# Patient Record
Sex: MALE | Race: WHITE | NOT HISPANIC OR LATINO | Employment: OTHER | URBAN - METROPOLITAN AREA
[De-identification: names, ages, dates, MRNs, and addresses within clinical notes are randomized per-mention and may not be internally consistent; named-entity substitution may affect disease eponyms.]

---

## 2017-01-19 ENCOUNTER — ALLSCRIPTS OFFICE VISIT (OUTPATIENT)
Dept: OTHER | Facility: OTHER | Age: 72
End: 2017-01-19

## 2017-02-25 ENCOUNTER — APPOINTMENT (OUTPATIENT)
Dept: LAB | Facility: HOSPITAL | Age: 72
End: 2017-02-25
Payer: MEDICARE

## 2017-02-25 ENCOUNTER — ALLSCRIPTS OFFICE VISIT (OUTPATIENT)
Dept: OTHER | Facility: OTHER | Age: 72
End: 2017-02-25

## 2017-02-25 DIAGNOSIS — L03.90 CELLULITIS: ICD-10-CM

## 2017-02-25 PROCEDURE — 87147 CULTURE TYPE IMMUNOLOGIC: CPT

## 2017-02-25 PROCEDURE — 87186 SC STD MICRODIL/AGAR DIL: CPT

## 2017-02-25 PROCEDURE — 87070 CULTURE OTHR SPECIMN AEROBIC: CPT

## 2017-02-27 ENCOUNTER — GENERIC CONVERSION - ENCOUNTER (OUTPATIENT)
Dept: OTHER | Facility: OTHER | Age: 72
End: 2017-02-27

## 2017-02-27 LAB
BACTERIA NOSE AEROBE CULT: NORMAL
BACTERIA NOSE AEROBE CULT: NORMAL

## 2017-04-06 ENCOUNTER — ALLSCRIPTS OFFICE VISIT (OUTPATIENT)
Dept: OTHER | Facility: OTHER | Age: 72
End: 2017-04-06

## 2017-04-06 ENCOUNTER — GENERIC CONVERSION - ENCOUNTER (OUTPATIENT)
Dept: OTHER | Facility: OTHER | Age: 72
End: 2017-04-06

## 2017-05-03 ENCOUNTER — GENERIC CONVERSION - ENCOUNTER (OUTPATIENT)
Dept: OTHER | Facility: OTHER | Age: 72
End: 2017-05-03

## 2017-07-10 ENCOUNTER — TRANSCRIBE ORDERS (OUTPATIENT)
Dept: ADMINISTRATIVE | Facility: HOSPITAL | Age: 72
End: 2017-07-10

## 2017-07-10 ENCOUNTER — APPOINTMENT (OUTPATIENT)
Dept: LAB | Facility: MEDICAL CENTER | Age: 72
End: 2017-07-10
Payer: MEDICARE

## 2017-07-10 DIAGNOSIS — R73.09 OTHER ABNORMAL GLUCOSE: ICD-10-CM

## 2017-07-10 DIAGNOSIS — E55.9 VITAMIN D DEFICIENCY: ICD-10-CM

## 2017-07-10 DIAGNOSIS — I10 ESSENTIAL (PRIMARY) HYPERTENSION: ICD-10-CM

## 2017-07-10 DIAGNOSIS — Z12.5 ENCOUNTER FOR SCREENING FOR MALIGNANT NEOPLASM OF PROSTATE: ICD-10-CM

## 2017-07-10 LAB
ALBUMIN SERPL BCP-MCNC: 3.7 G/DL (ref 3.5–5)
ALP SERPL-CCNC: 49 U/L (ref 46–116)
ALT SERPL W P-5'-P-CCNC: 27 U/L (ref 12–78)
ANION GAP SERPL CALCULATED.3IONS-SCNC: 5 MMOL/L (ref 4–13)
AST SERPL W P-5'-P-CCNC: 25 U/L (ref 5–45)
BASOPHILS # BLD AUTO: 0.01 THOUSANDS/ΜL (ref 0–0.1)
BASOPHILS NFR BLD AUTO: 0 % (ref 0–1)
BILIRUB SERPL-MCNC: 0.67 MG/DL (ref 0.2–1)
BUN SERPL-MCNC: 11 MG/DL (ref 5–25)
CALCIUM SERPL-MCNC: 9 MG/DL (ref 8.3–10.1)
CHLORIDE SERPL-SCNC: 106 MMOL/L (ref 100–108)
CHOLEST SERPL-MCNC: 180 MG/DL (ref 50–200)
CO2 SERPL-SCNC: 29 MMOL/L (ref 21–32)
CREAT SERPL-MCNC: 0.97 MG/DL (ref 0.6–1.3)
EOSINOPHIL # BLD AUTO: 0.41 THOUSAND/ΜL (ref 0–0.61)
EOSINOPHIL NFR BLD AUTO: 7 % (ref 0–6)
ERYTHROCYTE [DISTWIDTH] IN BLOOD BY AUTOMATED COUNT: 14.8 % (ref 11.6–15.1)
EST. AVERAGE GLUCOSE BLD GHB EST-MCNC: 126 MG/DL
GFR SERPL CREATININE-BSD FRML MDRD: >60 ML/MIN/1.73SQ M
GLUCOSE P FAST SERPL-MCNC: 93 MG/DL (ref 65–99)
HBA1C MFR BLD: 6 % (ref 4.2–6.3)
HCT VFR BLD AUTO: 45.9 % (ref 36.5–49.3)
HDLC SERPL-MCNC: 49 MG/DL (ref 40–60)
HGB BLD-MCNC: 15.7 G/DL (ref 12–17)
LDLC SERPL CALC-MCNC: 110 MG/DL (ref 0–100)
LYMPHOCYTES # BLD AUTO: 1.72 THOUSANDS/ΜL (ref 0.6–4.47)
LYMPHOCYTES NFR BLD AUTO: 30 % (ref 14–44)
MCH RBC QN AUTO: 30.9 PG (ref 26.8–34.3)
MCHC RBC AUTO-ENTMCNC: 34.2 G/DL (ref 31.4–37.4)
MCV RBC AUTO: 90 FL (ref 82–98)
MONOCYTES # BLD AUTO: 0.68 THOUSAND/ΜL (ref 0.17–1.22)
MONOCYTES NFR BLD AUTO: 12 % (ref 4–12)
NEUTROPHILS # BLD AUTO: 2.97 THOUSANDS/ΜL (ref 1.85–7.62)
NEUTS SEG NFR BLD AUTO: 51 % (ref 43–75)
NRBC BLD AUTO-RTO: 0 /100 WBCS
PLATELET # BLD AUTO: 181 THOUSANDS/UL (ref 149–390)
PMV BLD AUTO: 9.5 FL (ref 8.9–12.7)
POTASSIUM SERPL-SCNC: 4.2 MMOL/L (ref 3.5–5.3)
PROT SERPL-MCNC: 7.4 G/DL (ref 6.4–8.2)
PSA SERPL-MCNC: 1.7 NG/ML (ref 0–4)
RBC # BLD AUTO: 5.08 MILLION/UL (ref 3.88–5.62)
SODIUM SERPL-SCNC: 140 MMOL/L (ref 136–145)
TRIGL SERPL-MCNC: 103 MG/DL
TSH SERPL DL<=0.05 MIU/L-ACNC: 1.02 UIU/ML (ref 0.36–3.74)
WBC # BLD AUTO: 5.8 THOUSAND/UL (ref 4.31–10.16)

## 2017-07-10 PROCEDURE — 36415 COLL VENOUS BLD VENIPUNCTURE: CPT

## 2017-07-10 PROCEDURE — 85025 COMPLETE CBC W/AUTO DIFF WBC: CPT

## 2017-07-10 PROCEDURE — 83036 HEMOGLOBIN GLYCOSYLATED A1C: CPT

## 2017-07-10 PROCEDURE — G0103 PSA SCREENING: HCPCS

## 2017-07-10 PROCEDURE — 80061 LIPID PANEL: CPT

## 2017-07-10 PROCEDURE — 84443 ASSAY THYROID STIM HORMONE: CPT

## 2017-07-10 PROCEDURE — 80053 COMPREHEN METABOLIC PANEL: CPT

## 2017-07-20 ENCOUNTER — ALLSCRIPTS OFFICE VISIT (OUTPATIENT)
Dept: OTHER | Facility: OTHER | Age: 72
End: 2017-07-20

## 2017-08-18 ENCOUNTER — OFFICE VISIT (OUTPATIENT)
Dept: URGENT CARE | Facility: MEDICAL CENTER | Age: 72
End: 2017-08-18
Payer: MEDICARE

## 2017-08-18 PROCEDURE — 99202 OFFICE O/P NEW SF 15 MIN: CPT

## 2017-08-18 PROCEDURE — G0463 HOSPITAL OUTPT CLINIC VISIT: HCPCS

## 2017-08-23 ENCOUNTER — ALLSCRIPTS OFFICE VISIT (OUTPATIENT)
Dept: OTHER | Facility: OTHER | Age: 72
End: 2017-08-23

## 2017-09-14 ENCOUNTER — OFFICE VISIT (OUTPATIENT)
Dept: URGENT CARE | Facility: MEDICAL CENTER | Age: 72
End: 2017-09-14
Payer: MEDICARE

## 2017-09-14 PROCEDURE — G0463 HOSPITAL OUTPT CLINIC VISIT: HCPCS

## 2017-09-14 PROCEDURE — 99214 OFFICE O/P EST MOD 30 MIN: CPT

## 2017-11-29 ENCOUNTER — GENERIC CONVERSION - ENCOUNTER (OUTPATIENT)
Dept: FAMILY MEDICINE CLINIC | Facility: CLINIC | Age: 72
End: 2017-11-29

## 2017-12-29 ENCOUNTER — GENERIC CONVERSION - ENCOUNTER (OUTPATIENT)
Dept: FAMILY MEDICINE CLINIC | Facility: CLINIC | Age: 72
End: 2017-12-29

## 2018-01-10 ENCOUNTER — TRANSCRIBE ORDERS (OUTPATIENT)
Dept: ADMINISTRATIVE | Facility: HOSPITAL | Age: 73
End: 2018-01-10

## 2018-01-10 ENCOUNTER — APPOINTMENT (OUTPATIENT)
Dept: LAB | Facility: MEDICAL CENTER | Age: 73
End: 2018-01-10
Payer: MEDICARE

## 2018-01-10 DIAGNOSIS — I10 HYPERTENSION, UNSPECIFIED TYPE: ICD-10-CM

## 2018-01-10 DIAGNOSIS — E78.49 FAMILIAL COMBINED HYPERLIPIDEMIA: ICD-10-CM

## 2018-01-10 DIAGNOSIS — R73.09 ELEVATED GLUCOSE: Primary | ICD-10-CM

## 2018-01-10 DIAGNOSIS — R73.09 ELEVATED GLUCOSE: ICD-10-CM

## 2018-01-10 LAB
ALBUMIN SERPL BCP-MCNC: 3.8 G/DL (ref 3.5–5)
ALP SERPL-CCNC: 54 U/L (ref 46–116)
ALT SERPL W P-5'-P-CCNC: 27 U/L (ref 12–78)
ANION GAP SERPL CALCULATED.3IONS-SCNC: 4 MMOL/L (ref 4–13)
AST SERPL W P-5'-P-CCNC: 21 U/L (ref 5–45)
BILIRUB SERPL-MCNC: 0.81 MG/DL (ref 0.2–1)
BUN SERPL-MCNC: 11 MG/DL (ref 5–25)
CALCIUM SERPL-MCNC: 9 MG/DL (ref 8.3–10.1)
CHLORIDE SERPL-SCNC: 106 MMOL/L (ref 100–108)
CHOLEST SERPL-MCNC: 155 MG/DL (ref 50–200)
CO2 SERPL-SCNC: 30 MMOL/L (ref 21–32)
CREAT SERPL-MCNC: 0.92 MG/DL (ref 0.6–1.3)
EST. AVERAGE GLUCOSE BLD GHB EST-MCNC: 120 MG/DL
GFR SERPL CREATININE-BSD FRML MDRD: 82 ML/MIN/1.73SQ M
GLUCOSE P FAST SERPL-MCNC: 90 MG/DL (ref 65–99)
HBA1C MFR BLD: 5.8 % (ref 4.2–6.3)
HDLC SERPL-MCNC: 54 MG/DL (ref 40–60)
LDLC SERPL CALC-MCNC: 79 MG/DL (ref 0–100)
POTASSIUM SERPL-SCNC: 4.1 MMOL/L (ref 3.5–5.3)
PROT SERPL-MCNC: 7.8 G/DL (ref 6.4–8.2)
SODIUM SERPL-SCNC: 140 MMOL/L (ref 136–145)
TRIGL SERPL-MCNC: 112 MG/DL

## 2018-01-10 PROCEDURE — 36415 COLL VENOUS BLD VENIPUNCTURE: CPT

## 2018-01-10 PROCEDURE — 80061 LIPID PANEL: CPT

## 2018-01-10 PROCEDURE — 80053 COMPREHEN METABOLIC PANEL: CPT

## 2018-01-10 PROCEDURE — 83036 HEMOGLOBIN GLYCOSYLATED A1C: CPT

## 2018-01-11 NOTE — RESULT NOTES
Verified Results  * XR SPINE THORACIC 3 VIEW [id number] 12:54PM Jenny Pratt Order Number: [id number]     Test Name Result Flag Reference   XR SPINE THORACIC 3 VW (Report)     THORACIC SPINE     INDICATION: Lower thoracic pain      COMPARISON: None     VIEWS: AP, lateral and coned-down projections; 5 images     FINDINGS:     Thoracic vertebrae demonstrate normal stature  Minimal dextro scoliosis  Mild degenerative change with small endplate osteophytes  There is no fracture or pathologic bone lesion  There is no displacement of the paraspinal line  The pedicles are intact  IMPRESSION:     Mild degenerative change  No acute osseous abnormality         Workstation performed: [id number]     Signed by:   Eugenia aPz MD   7/19/16       Plan  Acute bilateral thoracic back pain    · *1 - [facility]  Consult  Status: Hold For - Scheduling   Requested for: [address] Summary provided  : Yes

## 2018-01-12 VITALS
WEIGHT: 221.19 LBS | BODY MASS INDEX: 29.31 KG/M2 | RESPIRATION RATE: 16 BRPM | OXYGEN SATURATION: 97 % | HEART RATE: 71 BPM | HEIGHT: 73 IN | DIASTOLIC BLOOD PRESSURE: 70 MMHG | TEMPERATURE: 95.4 F | SYSTOLIC BLOOD PRESSURE: 102 MMHG

## 2018-01-12 NOTE — RESULT NOTES
Verified Results  (1) NASAL CULTURE 35Ccw2421 04:39PM Madiha Mena    Order Number: [id number]     Test Name Result Flag Reference   CLINICAL REPORT (Report)     Test:        Nasal culture  Specimen Type:   Nasopharyngeal  Specimen Date:   2/25/2017 4:39 PM  Result Date:    2/27/2017 12:19 PM  Result Status:   Final result  Resulting Lab:   [place] [address]            Tel: [phone]      CULTURE                                       ------------------                                   4+ Growth of Staphylococcus aureus    4+ Growth of Mixed Respiratory Greta      SUSCEPTIBILITY                                   ------------------                                                       Staphylococcus aureus  METHOD                 KATERYNA  -------------------------------------  ----------------------------  AMOXICILLIN + CLAVULANATE        <=4/2 ug/ml   Susceptible  AMPICILLIN ($$)             >8 00 ug/ml   Resistant  AMPICILLIN + SULBACTAM ($)       <=8/4 ug/ml   Susceptible  CEFAZOLIN ($)              <=8 00 ug/ml   Susceptible  CLINDAMYCIN ($)             <=0 50 ug/ml   Susceptible  ERYTHROMYCIN ($$$$)           <=0 50 ug/ml   Susceptible  GENTAMICIN ($$)             <=4 ug/ml    Susceptible  OXACILLIN                0 50 ug/ml    Susceptible  TETRACYCLINE              <=4 ug/ml    Susceptible  TRIMETHOPRIM + SULFAMETHOXAZOLE ($$$)  <=0 5/9 5 ug/ml Susceptible  VANCOMYCIN ($)             1 00 ug/ml    Susceptible

## 2018-01-13 VITALS
HEIGHT: 73 IN | WEIGHT: 224.31 LBS | SYSTOLIC BLOOD PRESSURE: 138 MMHG | BODY MASS INDEX: 29.73 KG/M2 | DIASTOLIC BLOOD PRESSURE: 74 MMHG | OXYGEN SATURATION: 95 % | TEMPERATURE: 97.7 F | HEART RATE: 70 BPM

## 2018-01-13 VITALS
OXYGEN SATURATION: 98 % | TEMPERATURE: 95.7 F | RESPIRATION RATE: 16 BRPM | DIASTOLIC BLOOD PRESSURE: 70 MMHG | BODY MASS INDEX: 29.71 KG/M2 | WEIGHT: 224.19 LBS | SYSTOLIC BLOOD PRESSURE: 118 MMHG | HEART RATE: 74 BPM | HEIGHT: 73 IN

## 2018-01-13 VITALS
DIASTOLIC BLOOD PRESSURE: 60 MMHG | BODY MASS INDEX: 29.36 KG/M2 | SYSTOLIC BLOOD PRESSURE: 110 MMHG | WEIGHT: 221.5 LBS | RESPIRATION RATE: 18 BRPM | TEMPERATURE: 97.9 F | HEIGHT: 73 IN | OXYGEN SATURATION: 93 % | HEART RATE: 84 BPM

## 2018-01-13 VITALS
TEMPERATURE: 97.9 F | OXYGEN SATURATION: 94 % | SYSTOLIC BLOOD PRESSURE: 110 MMHG | WEIGHT: 223 LBS | HEIGHT: 73 IN | HEART RATE: 74 BPM | DIASTOLIC BLOOD PRESSURE: 78 MMHG | BODY MASS INDEX: 29.55 KG/M2

## 2018-01-22 ENCOUNTER — ALLSCRIPTS OFFICE VISIT (OUTPATIENT)
Dept: OTHER | Facility: OTHER | Age: 73
End: 2018-01-22

## 2018-01-23 NOTE — PROGRESS NOTES
Assessment   1  Benign essential hypertension (401 1) (I10)   2  Abnormal blood sugar (790 29) (R73 09)   3  Intravascular lymphomatosis   4  BPH without urinary obstruction (600 00) (N40 0)    Plan   Abnormal blood sugar, Benign essential hypertension, Encounter for prostate cancer    screening, Familial combined hyperlipidemia, Intravascular lymphomatosis    · (1) PSA (SCREEN) (Dx V76 44 Screen for Prostate Cancer); Status:Hold For - Exact Date; Requested for:After [id number]; Abnormal blood sugar, Benign essential hypertension, Familial combined    hyperlipidemia, Intravascular lymphomatosis    · (1) CBC/PLT/DIFF; Status:Hold For - Exact Date; Requested for:After 40YTT3640;    · (1) COMPREHENSIVE METABOLIC PANEL; Status:Hold For - Exact Date; Requested    for:After 23NIM9135;    · (1) HEMOGLOBIN A1C; Status:Hold For - Exact Date; Requested for:After [id number];    · (1) LIPID PANEL FASTING W DIRECT LDL REFLEX; Status:Hold For - Exact Date; Requested for:After 25NDZ7956;    · (1) TSH WITH FT4 REFLEX; Status:Hold For - Exact Date; Requested for:After [id number]; Discussion/Summary      -- arthritis: Recommend continue glucosamine  Recommend adding OTC Omega 3 and increasing low-impact aerobic exercise  Hypertension: Well controlled on quinapril 20 mg daily  prediabetes: A1c 5 8%, was 6 0%  Continue reduced carb diet  Will continue to monitor papulosis: continue regular follow-up with his dermatologist, Dr Floyd Bernal   BPH     patient had flu shot   6 months, Medicare wellness and follow-up, fasting blood work at [email or web address] prior  Possible side effects of new medications were reviewed with the patient/guardian today  The treatment plan was reviewed with the patient/guardian  The patient/guardian understands and agrees with the treatment plan      Chief Complaint   pt here for his 6 month check up and to review labs    Patient is here today for follow up of chronic conditions described in HPI  History of Present Illness   Patient presents for recheck of chronic medical problems today  Overall is feeling well  Patient complains occasional various arthritic pains  He has been using glucosamine regularly  Patient maintains an active lifestyle, but does not exercise regularly  Doing well on quinapril for hypertension  Patient had labs drawn on January 16th      Review of Systems        Constitutional: No fever or chills, feels well, no tiredness, no recent weight gain or weight loss  Cardiovascular: No complaints of slow heart rate, no fast heart rate, no chest pain, no palpitations, no leg claudication, no lower extremity  Respiratory: No complaints of shortness of breath, no wheezing, no cough, no SOB on exertion, no orthopnea or PND  Gastrointestinal: No complaints of abdominal pain, no constipation, no nausea or vomiting, no diarrhea or bloody stools  Genitourinary: No complaints of dysuria, no incontinence, no hesitancy, no nocturia, no genital lesion, no testicular pain  Active Problems   1  Abnormal blood sugar (790 29) (R73 09)   2  Acute bronchitis (466 0) (J20 9)   3  Allergic rhinitis (477 9) (J30 9)   4  Benign essential hypertension (401 1) (I10)   5  BPH without urinary obstruction (600 00) (N40 0)   6  Colon cancer screening (V76 51) (Z12 11)   7  Corneal abrasion, left (918 1) (S05 02XA)   8  Corneal abrasion, right (918 1) (S05 01XA)   9  Depression screen (V79 0) (Z13 89)   10  Encounter for prostate cancer screening (V76 44) (Z12 5)   11  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)   12  Intravascular lymphomatosis   13  Mild vitamin D deficiency (268 9) (E55 9)   14  Need for influenza vaccination (V04 81) (Z23)   15  Screening for other and unspecified genitourinary condition (V81 6) (Z13 89)   16  Special screening for other neurological conditions (V80 09) (Z13 89)    Past Medical History   1  History of Abnormal blood chemistry (790 6) (R79 9)   2  History of Abnormal blood sugar (790 29) (R73 09)   3  History of Abnormal blood sugar (790 29) (R73 09)   4  History of Acute bilateral thoracic back pain (724 1) (M54 6)   5  History of Blocked eustachian tube (381 60) (H68 109)   6  History of Cellulitis (682 9) (L03 90)   7  History of Depression screen (V79 0) (Z13 89)   8  History of Depression screen (V79 0) (Z13 89)   9  History of Diabetes Mellitus (250 00)   10  History of Encounter for prostate cancer screening (V76 44) (Z12 5)   11  History of Encounter for prostate cancer screening (V76 44) (Z12 5)   12  History of Flu vaccine need (V04 81) (Z23)   13  History of Greater trochanteric bursitis of left hip (726 5) (M70 62)   14  History of Hip pain (719 45) (M25 559)   15  History of acute sinusitis (V12 69) (Z87 09)   16  History of acute sinusitis (V12 69) (Z87 09)   17  History of allergic rhinitis (V12 69) (Z87 09)   18  History of dermatitis (V13 3) (Z87 2)   19  History of Knee pain (719 46) (M25 569)   20  History of Limb pain (729 5) (M79 609)   21  History of Lower abdominal pain (789 09) (R10 30)   22  History of Need for prophylactic vaccination and inoculation against influenza (V04 81)      (Z23)   23  History of Need for vaccination with 13-polyvalent pneumococcal conjugate vaccine      (V03 82) (Z23)   24  History of Nodule of finger of right hand (782 2) (R22 31)   25  History of Obstruction of right eustachian tube (381 60) (H68 101)   26  History of Olecranon bursitis of right elbow (726 33) (M70 21)   27  History of Prostate cancer screening (V76 44) (Z12 5)   28  History of Screening for genitourinary condition (V81 6) (Z13 89)   29  History of Special screening examination for neoplasm of prostate (V76 44) (Z12 5)   30  History of Special screening for other neurological conditions (V80 09) (Z13 89)   31  History of Trochanteric bursitis (726 5) (M70 60)     The active problems and past medical history were reviewed and updated today  Family History   Mother    1  No pertinent family history    Social History    · Denied: History of Alcohol use   · Denied: History of Drug use   · Never A Smoker  The social history was reviewed and updated today  The social history was reviewed and is unchanged  Current Meds    1  Aspirin 81 MG TABS; TAKE 1 TABLET DAILY; Therapy: 98FVU5726 to Recorded   2  Azelastine HCl - 0 15 % Nasal Solution; INHALE 2 DROPS Daily; Therapy: 94Idb5914 to Recorded   3  Centrum Silver Oral Tablet; Therapy: 02OVS1741 to Recorded   4  Clobetasol Propionate 0 05 % External Cream; APPLY SPARINGLY TO AFFECTED     AREA(S) TWICE DAILY; Therapy: (Recorded:11Jan2016) to Recorded   5  Cosamin -400 MG Oral Tablet; TAKE 1 TABLET 3 times daily; Therapy: 40Hqo5030 to Recorded   6  Quinapril HCl - 20 MG Oral Tablet; take 1 tablet daily as needed; Therapy: 39Mmw5688 to (Evaluate:72Riu0022)  Requested for: 40Wvj6200; Last     Rx:78Usr2728 Ordered   7  Soolantra 1 % External Cream;     Therapy: 09EHM0502 to Recorded   8  Tobramycin 0 3 % Ophthalmic Solution; Instill 1-2 drops into affected eye every 2-4 hours     for 5 days; Therapy: 55Jpd3107 to (Evaluate:64Zte9563)  Requested for: 84Ino8307; Last     Rx:86Cqz9322 Ordered   9  Tobramycin 0 3 % Ophthalmic Solution; INSTILL 2 DROP 4 times daily; Therapy: 37LSS7968 to (Last Rx:89Ekw0782)  Requested for: 41Rai2017 Ordered   10  Vitamin D 1000 UNIT Oral Tablet; TAKE 1 TABLET DAILY; Therapy: (Recorded:11Jan2016) to Recorded     The medication list was reviewed and updated today  Allergies   1   Amoxil TABS    Vitals   Vital Signs    Recorded: 82BUL9924 08:38AM   Temperature 96 4 F, Tympanic   Heart Rate 66   Pulse Quality Regular   Respiration 18   Systolic 111, RUE, Sitting   Diastolic 60, RUE, Sitting   Height 6 ft 1 in   Weight 222 lb    BMI Calculated 29 29   BSA Calculated 2 25   O2 Saturation 99   Pain Scale 0     Physical Exam Constitutional      General appearance: No acute distress, well appearing and well nourished  Pulmonary      Respiratory effort: No increased work of breathing or signs of respiratory distress  Auscultation of lungs: Clear to auscultation, equal breath sounds bilaterally, no wheezes, no rales, no rhonci  Cardiovascular      Palpation of heart: Normal PMI, no thrills  Auscultation of heart: Normal rate and rhythm, normal S1 and S2, without murmurs  Examination of extremities for edema and/or varicosities: Normal        Carotid pulses: Normal        Lymphatic      Palpation of lymph nodes in neck: No lymphadenopathy  Psychiatric      Orientation to person, place and time: Normal        Mood and affect: Normal           Signatures    Electronically signed by :  Noy Herrmann DO; Jan 22 2018  9:00AM EST                       (Author)
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
## 2018-02-26 ENCOUNTER — TRANSCRIBE ORDERS (OUTPATIENT)
Dept: ADMINISTRATIVE | Facility: HOSPITAL | Age: 73
End: 2018-02-26

## 2018-02-26 ENCOUNTER — APPOINTMENT (OUTPATIENT)
Dept: LAB | Facility: MEDICAL CENTER | Age: 73
End: 2018-02-26
Payer: MEDICARE
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
## 2018-02-26 PROCEDURE — 36415 COLL VENOUS BLD VENIPUNCTURE: CPT

## 2018-02-26 PROCEDURE — 86618 LYME DISEASE ANTIBODY: CPT

## 2018-02-27 LAB
B BURGDOR IGG SER IA-ACNC: 0.19
B BURGDOR IGM SER IA-ACNC: 0.17

## 2018-06-06 RX ORDER — TOBRAMYCIN 3 MG/ML
2 SOLUTION/ DROPS OPHTHALMIC 4 TIMES DAILY
COMMUNITY
Start: 2017-08-18 | End: 2018-07-20 | Stop reason: ALTCHOICE

## 2018-06-06 RX ORDER — IVERMECTIN 10 MG/G
CREAM TOPICAL
COMMUNITY
Start: 2016-07-18 | End: 2018-07-20 | Stop reason: ALTCHOICE

## 2018-06-06 RX ORDER — CLOBETASOL PROPIONATE 0.5 MG/G
OINTMENT TOPICAL
Refills: 1 | COMMUNITY
Start: 2018-04-17

## 2018-06-06 RX ORDER — LANOLIN ALCOHOL/MO/W.PET/CERES
1 CREAM (GRAM) TOPICAL 3 TIMES DAILY
COMMUNITY
Start: 2017-07-20 | End: 2022-02-09

## 2018-06-06 RX ORDER — QUINAPRIL 20 MG/1
20 TABLET ORAL DAILY PRN
Refills: 3 | COMMUNITY
Start: 2018-03-30 | End: 2018-09-28 | Stop reason: SDUPTHER

## 2018-06-15 ENCOUNTER — OFFICE VISIT (OUTPATIENT)
Dept: FAMILY MEDICINE CLINIC | Facility: CLINIC | Age: 73
End: 2018-06-15
Payer: MEDICARE

## 2018-06-15 VITALS
DIASTOLIC BLOOD PRESSURE: 80 MMHG | OXYGEN SATURATION: 96 % | HEIGHT: 72 IN | BODY MASS INDEX: 29.93 KG/M2 | RESPIRATION RATE: 16 BRPM | SYSTOLIC BLOOD PRESSURE: 110 MMHG | WEIGHT: 221 LBS | TEMPERATURE: 97.4 F | HEART RATE: 76 BPM

## 2018-06-15 DIAGNOSIS — H25.9 AGE-RELATED CATARACT OF BOTH EYES, UNSPECIFIED AGE-RELATED CATARACT TYPE: ICD-10-CM

## 2018-06-15 DIAGNOSIS — Z01.818 PREOP EXAMINATION: Primary | ICD-10-CM

## 2018-06-15 PROCEDURE — 99214 OFFICE O/P EST MOD 30 MIN: CPT | Performed by: FAMILY MEDICINE

## 2018-06-15 RX ORDER — AZELASTINE HCL 205.5 UG/1
SPRAY NASAL
Refills: 6 | COMMUNITY
Start: 2018-05-29 | End: 2020-01-29

## 2018-06-15 NOTE — ASSESSMENT & PLAN NOTE
Patient presents for preop clearance for upcoming bilateral cataracts surgery  Left eye on June 20th, right eye on July 11       PATIENT IS MEDICALLY CLEARED FOR BOTH UPCOMING SURGERIES

## 2018-06-15 NOTE — ASSESSMENT & PLAN NOTE
PATIENT MEDICALLY CLEARED FOR UPCOMING SURGERIES ON June 20TH IN July 11TH  NOTE: PATIENT'S OTHER MEDICAL CONDITIONS WERE REVIEWED TODAY, INCLUDING HIS MEDICATIONS

## 2018-06-15 NOTE — PROGRESS NOTES
Assessment/Plan:    Age-related cataract of both eyes   Patient presents for preop clearance for upcoming bilateral cataracts surgery  Left eye on June 20th, right eye on July 11  PATIENT IS MEDICALLY CLEARED FOR BOTH UPCOMING SURGERIES    Preop examination   PATIENT MEDICALLY CLEARED FOR UPCOMING SURGERIES ON June 20TH IN July 11TH  NOTE: PATIENT'S OTHER MEDICAL CONDITIONS WERE REVIEWED TODAY, INCLUDING HIS MEDICATIONS  Diagnoses and all orders for this visit:    Preop examination    Age-related cataract of both eyes, unspecified age-related cataract type          Subjective:      Patient ID: El Valle is a 75 y o  male  Patient presents for preop clearance for upcoming bilateral cataracts surgery  Left eye on June 20th, right eye on July 11th  Patient without any other complaints today  He is doing well on all prescribed medications without side effects  The following portions of the patient's history were reviewed and updated as appropriate: allergies, current medications, past family history, past medical history, past social history, past surgical history and problem list     Review of Systems   Respiratory: Negative  Cardiovascular: Negative  Gastrointestinal: Negative  Genitourinary: Negative  Objective:      /80 (BP Location: Left arm, Patient Position: Sitting, Cuff Size: Standard)   Pulse 76   Temp (!) 97 4 °F (36 3 °C) (Tympanic)   Resp 16   Ht 6' 0 05" (1 83 m)   Wt 100 kg (221 lb)   SpO2 96%   BMI 29 93 kg/m²          Physical Exam   Cardiovascular: Normal rate, regular rhythm, normal heart sounds and intact distal pulses  Carotids: no bruits  Ext: no edema   Pulmonary/Chest: Effort normal  No respiratory distress  He has no wheezes  He has no rales  Psychiatric: He has a normal mood and affect   His behavior is normal  Thought content normal

## 2018-07-16 ENCOUNTER — APPOINTMENT (OUTPATIENT)
Dept: LAB | Facility: MEDICAL CENTER | Age: 73
End: 2018-07-16
Payer: MEDICARE

## 2018-07-16 ENCOUNTER — TRANSCRIBE ORDERS (OUTPATIENT)
Dept: ADMINISTRATIVE | Facility: HOSPITAL | Age: 73
End: 2018-07-16

## 2018-07-16 DIAGNOSIS — E78.49 FAMILIAL COMBINED HYPERLIPIDEMIA: ICD-10-CM

## 2018-07-16 DIAGNOSIS — R73.09 OTHER ABNORMAL GLUCOSE: Primary | ICD-10-CM

## 2018-07-16 DIAGNOSIS — I10 ESSENTIAL HYPERTENSION, MALIGNANT: ICD-10-CM

## 2018-07-16 DIAGNOSIS — R73.09 OTHER ABNORMAL GLUCOSE: ICD-10-CM

## 2018-07-16 LAB
ALBUMIN SERPL BCP-MCNC: 3.9 G/DL (ref 3.5–5)
ALP SERPL-CCNC: 45 U/L (ref 46–116)
ALT SERPL W P-5'-P-CCNC: 31 U/L (ref 12–78)
ANION GAP SERPL CALCULATED.3IONS-SCNC: 3 MMOL/L (ref 4–13)
AST SERPL W P-5'-P-CCNC: 20 U/L (ref 5–45)
BASOPHILS # BLD AUTO: 0.02 THOUSANDS/ΜL (ref 0–0.1)
BASOPHILS NFR BLD AUTO: 0 % (ref 0–1)
BILIRUB SERPL-MCNC: 0.69 MG/DL (ref 0.2–1)
BUN SERPL-MCNC: 14 MG/DL (ref 5–25)
CALCIUM SERPL-MCNC: 9 MG/DL (ref 8.3–10.1)
CHLORIDE SERPL-SCNC: 106 MMOL/L (ref 100–108)
CHOLEST SERPL-MCNC: 166 MG/DL (ref 50–200)
CO2 SERPL-SCNC: 29 MMOL/L (ref 21–32)
CREAT SERPL-MCNC: 0.99 MG/DL (ref 0.6–1.3)
EOSINOPHIL # BLD AUTO: 0.33 THOUSAND/ΜL (ref 0–0.61)
EOSINOPHIL NFR BLD AUTO: 6 % (ref 0–6)
ERYTHROCYTE [DISTWIDTH] IN BLOOD BY AUTOMATED COUNT: 14 % (ref 11.6–15.1)
EST. AVERAGE GLUCOSE BLD GHB EST-MCNC: 111 MG/DL
GFR SERPL CREATININE-BSD FRML MDRD: 75 ML/MIN/1.73SQ M
GLUCOSE P FAST SERPL-MCNC: 90 MG/DL (ref 65–99)
HBA1C MFR BLD: 5.5 % (ref 4.2–6.3)
HCT VFR BLD AUTO: 47.5 % (ref 36.5–49.3)
HDLC SERPL-MCNC: 47 MG/DL (ref 40–60)
HGB BLD-MCNC: 15.9 G/DL (ref 12–17)
IMM GRANULOCYTES # BLD AUTO: 0.01 THOUSAND/UL (ref 0–0.2)
IMM GRANULOCYTES NFR BLD AUTO: 0 % (ref 0–2)
LDLC SERPL CALC-MCNC: 101 MG/DL (ref 0–100)
LYMPHOCYTES # BLD AUTO: 1.63 THOUSANDS/ΜL (ref 0.6–4.47)
LYMPHOCYTES NFR BLD AUTO: 28 % (ref 14–44)
MCH RBC QN AUTO: 30.7 PG (ref 26.8–34.3)
MCHC RBC AUTO-ENTMCNC: 33.5 G/DL (ref 31.4–37.4)
MCV RBC AUTO: 92 FL (ref 82–98)
MONOCYTES # BLD AUTO: 0.65 THOUSAND/ΜL (ref 0.17–1.22)
MONOCYTES NFR BLD AUTO: 11 % (ref 4–12)
NEUTROPHILS # BLD AUTO: 3.19 THOUSANDS/ΜL (ref 1.85–7.62)
NEUTS SEG NFR BLD AUTO: 55 % (ref 43–75)
NRBC BLD AUTO-RTO: 0 /100 WBCS
PLATELET # BLD AUTO: 207 THOUSANDS/UL (ref 149–390)
PMV BLD AUTO: 9.2 FL (ref 8.9–12.7)
POTASSIUM SERPL-SCNC: 4.2 MMOL/L (ref 3.5–5.3)
PROT SERPL-MCNC: 7.6 G/DL (ref 6.4–8.2)
PSA SERPL-MCNC: 1.6 NG/ML (ref 0–4)
RBC # BLD AUTO: 5.18 MILLION/UL (ref 3.88–5.62)
SODIUM SERPL-SCNC: 138 MMOL/L (ref 136–145)
TRIGL SERPL-MCNC: 91 MG/DL
TSH SERPL DL<=0.05 MIU/L-ACNC: 1.21 UIU/ML (ref 0.36–3.74)
WBC # BLD AUTO: 5.83 THOUSAND/UL (ref 4.31–10.16)

## 2018-07-16 PROCEDURE — 36415 COLL VENOUS BLD VENIPUNCTURE: CPT

## 2018-07-16 PROCEDURE — 85025 COMPLETE CBC W/AUTO DIFF WBC: CPT

## 2018-07-16 PROCEDURE — 80053 COMPREHEN METABOLIC PANEL: CPT

## 2018-07-16 PROCEDURE — 80061 LIPID PANEL: CPT

## 2018-07-16 PROCEDURE — 83036 HEMOGLOBIN GLYCOSYLATED A1C: CPT

## 2018-07-16 PROCEDURE — 84443 ASSAY THYROID STIM HORMONE: CPT

## 2018-07-16 PROCEDURE — G0103 PSA SCREENING: HCPCS

## 2018-07-20 ENCOUNTER — OFFICE VISIT (OUTPATIENT)
Dept: FAMILY MEDICINE CLINIC | Facility: CLINIC | Age: 73
End: 2018-07-20
Payer: MEDICARE

## 2018-07-20 VITALS
RESPIRATION RATE: 15 BRPM | HEART RATE: 79 BPM | DIASTOLIC BLOOD PRESSURE: 66 MMHG | BODY MASS INDEX: 29.61 KG/M2 | SYSTOLIC BLOOD PRESSURE: 112 MMHG | TEMPERATURE: 97.4 F | HEIGHT: 73 IN | WEIGHT: 223.4 LBS | OXYGEN SATURATION: 94 %

## 2018-07-20 DIAGNOSIS — C85.80: Primary | ICD-10-CM

## 2018-07-20 DIAGNOSIS — Z23 NEED FOR VACCINATION: ICD-10-CM

## 2018-07-20 DIAGNOSIS — R73.09 ABNORMAL BLOOD SUGAR: ICD-10-CM

## 2018-07-20 DIAGNOSIS — I10 BENIGN ESSENTIAL HYPERTENSION: ICD-10-CM

## 2018-07-20 DIAGNOSIS — M19.90 ARTHRITIS: ICD-10-CM

## 2018-07-20 PROBLEM — Z01.818 PREOP EXAMINATION: Status: RESOLVED | Noted: 2018-06-15 | Resolved: 2018-07-20

## 2018-07-20 PROBLEM — H25.9 AGE-RELATED CATARACT OF BOTH EYES: Status: RESOLVED | Noted: 2018-06-15 | Resolved: 2018-07-20

## 2018-07-20 PROCEDURE — 99214 OFFICE O/P EST MOD 30 MIN: CPT | Performed by: FAMILY MEDICINE

## 2018-07-20 RX ORDER — DOXYCYCLINE HYCLATE 100 MG
100 TABLET ORAL 2 TIMES DAILY
Refills: 0 | COMMUNITY
Start: 2018-07-14 | End: 2018-09-30

## 2018-07-20 NOTE — PROGRESS NOTES
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[facility])   History of lymphomatosis  Papulosis  Being followed by his dermatologist, Dr oPlo Woodard   Recent flare  Patient was started on doxycycline  May be referred back to specialist in [place] if flare-up continues  Benign essential hypertension    Blood pressure controlled on quinapril 20 mg daily  Will continue to monitor    Arthritis   Patient getting some relief from glucosamine, but he has not been able to tolerate Omega 3 or Krill oil  I recommended that he try to freeze the capsules this see if this helped with the GI intolerance  Lately, he has been having more pain with his left knee  Consider x-rays if symptoms persist   Patient may also benefit from  Kenalog injection  Diagnoses and all orders for this visit:    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([address] )    Abnormal blood sugar    Benign essential hypertension    Need for vaccination  -     Zoster Vac Recomb Adjuvanted ([address] [place] [address] [address]) 50 MCG SUSR; Inject 1 Dose into a muscle once for 1 dose    Arthritis    Other orders  -     doxycycline hyclate (VIBRA-TABS) 100 mg tablet; Take 100 mg by mouth 2 (two) times a day        6 MOS (LABS YEARLY AT [facility])  awv next visit  RX GIVEN FOR [facility]    Subjective:      Patient ID: Erika Mcdaniel is a 75 y o  male  Patient presents for recheck of chronic medical problems today  He has had a recent flare of his lymphomatosis  He also has occasional pain in his left knee, otherwise he is doing well  Doing well on quinapril for high blood pressure    Patient [facility]        The following portions of the patient's history were reviewed and updated as appropriate: allergies, current medications, past family history, past medical history, past social history, past surgical history and problem list     Review of Systems      Objective:      /66 (BP Location: Left arm, Patient Position: Sitting, Cuff Size: Adult)   Pulse 79   Temp (!) 97 4 °F (36 3 °C) (Tympanic)   Resp 15   Ht 6' 1" (1 854 m)   Wt 101 kg (223 lb 6 4 oz)   SpO2 94%   BMI 29 47 kg/m²          Physical Exam

## 2018-07-20 NOTE — ASSESSMENT & PLAN NOTE
Patient getting some relief from glucosamine, but he has not been able to tolerate Omega 3 or Krill oil  I recommended that he try to freeze the capsules this see if this helped with the GI intolerance  Lately, he has been having more pain with his left knee  Consider x-rays if symptoms persist   Patient may also benefit from  Kenalog injection

## 2018-07-20 NOTE — ASSESSMENT & PLAN NOTE
Blood sugar 90, A1c 5 5% (was 5 8%)  This number continues to improve  Recommend continue diet and exercise    Will continue to monitor

## 2018-09-28 DIAGNOSIS — I10 ESSENTIAL HYPERTENSION: Primary | ICD-10-CM

## 2018-09-30 ENCOUNTER — OFFICE VISIT (OUTPATIENT)
Dept: FAMILY MEDICINE CLINIC | Facility: CLINIC | Age: 73
End: 2018-09-30
Payer: MEDICARE

## 2018-09-30 VITALS
RESPIRATION RATE: 16 BRPM | SYSTOLIC BLOOD PRESSURE: 102 MMHG | BODY MASS INDEX: 29.74 KG/M2 | WEIGHT: 224.4 LBS | DIASTOLIC BLOOD PRESSURE: 74 MMHG | OXYGEN SATURATION: 95 % | TEMPERATURE: 98.1 F | HEART RATE: 65 BPM | HEIGHT: 73 IN

## 2018-09-30 DIAGNOSIS — J01.90 ACUTE NON-RECURRENT SINUSITIS, UNSPECIFIED LOCATION: Primary | ICD-10-CM

## 2018-09-30 DIAGNOSIS — J40 BRONCHITIS: ICD-10-CM

## 2018-09-30 PROCEDURE — 99213 OFFICE O/P EST LOW 20 MIN: CPT | Performed by: PHYSICIAN ASSISTANT

## 2018-09-30 RX ORDER — METHYLPREDNISOLONE 4 MG/1
TABLET ORAL
Qty: 21 TABLET | Refills: 0 | Status: SHIPPED | OUTPATIENT
Start: 2018-09-30 | End: 2019-01-21

## 2018-09-30 RX ORDER — QUINAPRIL 20 MG/1
TABLET ORAL
Qty: 90 TABLET | Refills: 3 | Status: SHIPPED | OUTPATIENT
Start: 2018-09-30 | End: 2019-09-23 | Stop reason: SDUPTHER

## 2018-09-30 RX ORDER — CEFUROXIME AXETIL 500 MG/1
500 TABLET ORAL EVERY 12 HOURS SCHEDULED
Qty: 20 TABLET | Refills: 0 | Status: SHIPPED | OUTPATIENT
Start: 2018-09-30 | End: 2018-10-10

## 2018-09-30 NOTE — PROGRESS NOTES
Assessment/Plan:      Diagnoses and all orders for this visit:    Acute non-recurrent sinusitis, unspecified location  -     Methylprednisolone 4 MG TBPK; Use as directed on package  -     cefuroxime (CEFTIN) 500 mg tablet; Take 1 tablet (500 mg total) by mouth every 12 (twelve) hours for 10 days    Bronchitis  -     Methylprednisolone 4 MG TBPK; Use as directed on package  -     cefuroxime (CEFTIN) 500 mg tablet; Take 1 tablet (500 mg total) by mouth every 12 (twelve) hours for 10 days        Patient is a 42-year-old male presenting today for ongoing cough and postnasal drip as well as upper respiratory symptoms  I will treat him with a course of Ceftin b i d  Times 10 days for suspected acute sinusitis and bronchitis  He also does have some faint wheezing and coarse breath sounds on exam of his lungs today  Risks versus benefits and side effects of Medrol Dosepak were discussed with patient today and he will complete the pack as directed  He will take Mucinex DM during the day  Rest fluids, saltwater gargles as well as warm humidification and elevating head of bed at night were advised  We will see how he does through the course of the week and he is to call or follow up with persistent or worsening symptoms  Chief Complaint   Patient presents with    Cough     nasal congestion, for couple weeks, patient states that is getting worse  Subjective:     Patient ID: Bailey Sears is a 75 y o  male     76y/o male here today for persistent cough and URI  He does have some nasal congestion, slight chest tightness, PND and cough  Sore throat from coughing, no ear pain  Tried mucinex  Review of Systems   Constitutional: Negative for fever  HENT: Positive for congestion and sore throat  Negative for ear pain  Respiratory: Positive for cough, chest tightness, shortness of breath and wheezing  Cardiovascular: Negative  Psychiatric/Behavioral: Negative            The following portions of the patient's history were reviewed and updated as appropriate: allergies, current medications, past family history, past medical history, past social history, past surgical history and problem list       Objective:     Physical Exam   Constitutional: He is oriented to person, place, and time  He appears well-developed  No distress  HENT:   Head: Normocephalic  Right Ear: Tympanic membrane and ear canal normal    Left Ear: Tympanic membrane and ear canal normal    Nose: Mucosal edema and rhinorrhea present  Mouth/Throat: Posterior oropharyngeal erythema (mild, PND) present  Neck: Neck supple  Cardiovascular: Normal rate, regular rhythm and normal heart sounds  Pulmonary/Chest: Effort normal  He has no decreased breath sounds  He has wheezes (faint, end of expiration)  Dry hacking cough, especially with inspiration   Lymphadenopathy:     He has no cervical adenopathy  Neurological: He is alert and oriented to person, place, and time  Psychiatric: He has a normal mood and affect  Vitals reviewed        Vitals:    09/30/18 0951   BP: 102/74   BP Location: Left arm   Patient Position: Sitting   Cuff Size: Large   Pulse: 65   Resp: 16   Temp: 98 1 °F (36 7 °C)   TempSrc: Tympanic   SpO2: 95%   Weight: 102 kg (224 lb 6 4 oz)   Height: 6' 1" (1 854 m)

## 2019-01-21 ENCOUNTER — OFFICE VISIT (OUTPATIENT)
Dept: FAMILY MEDICINE CLINIC | Facility: CLINIC | Age: 74
End: 2019-01-21
Payer: MEDICARE

## 2019-01-21 VITALS
HEIGHT: 72 IN | RESPIRATION RATE: 17 BRPM | OXYGEN SATURATION: 99 % | DIASTOLIC BLOOD PRESSURE: 70 MMHG | BODY MASS INDEX: 30.77 KG/M2 | SYSTOLIC BLOOD PRESSURE: 104 MMHG | TEMPERATURE: 97.5 F | WEIGHT: 227.19 LBS | HEART RATE: 70 BPM

## 2019-01-21 DIAGNOSIS — C85.80: ICD-10-CM

## 2019-01-21 DIAGNOSIS — R73.09 ABNORMAL BLOOD SUGAR: ICD-10-CM

## 2019-01-21 DIAGNOSIS — I10 BENIGN ESSENTIAL HYPERTENSION: ICD-10-CM

## 2019-01-21 DIAGNOSIS — Z23 NEED FOR VACCINATION: ICD-10-CM

## 2019-01-21 DIAGNOSIS — M19.90 ARTHRITIS: ICD-10-CM

## 2019-01-21 DIAGNOSIS — Z12.5 SCREENING FOR PROSTATE CANCER: ICD-10-CM

## 2019-01-21 DIAGNOSIS — Z00.00 ENCOUNTER FOR MEDICARE ANNUAL WELLNESS EXAM: Primary | ICD-10-CM

## 2019-01-21 PROCEDURE — G0439 PPPS, SUBSEQ VISIT: HCPCS | Performed by: FAMILY MEDICINE

## 2019-01-21 PROCEDURE — 99214 OFFICE O/P EST MOD 30 MIN: CPT | Performed by: FAMILY MEDICINE

## 2019-01-21 NOTE — ASSESSMENT & PLAN NOTE
Stable  Patient no longer sees dermatologist in [place]    He is now being managed by his local dermatologist, Dr Lisa Michael

## 2019-01-21 NOTE — ASSESSMENT & PLAN NOTE
Still with various joint pains but overall stable on glucosamine    I advised him to restart his Omega 3 and increase his activity level

## 2019-01-21 NOTE — PROGRESS NOTES
Assessment and Plan:  Problem List Items Addressed This Visit     Benign essential hypertension    Relevant Orders    CBC and differential    Abnormal blood sugar    Relevant Orders    Comprehensive metabolic panel    Hemoglobin A1C    Lipid Panel with Direct LDL reflex    TSH, 3rd generation with Free T4 reflex    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(HCC)    Arthritis      Other Visit Diagnoses     Encounter for Medicare annual wellness exam    -  Primary    Need for vaccination        Relevant Medications    Zoster Vac Recomb Adjuvanted ([facility]) 48 MCG/0 5ML SUSR    Screening for prostate cancer        Relevant Orders    PSA, Total Screen        Health Maintenance Due   Topic Date Due    Medicare Annual Wellness Visit (AWV)  1945    Pneumococcal PPSV23/PCV13 65+ Years / High and Highest Risk (2 of 2 - PPSV23) 09/03/2015     Pt has LV and HC POA  Current w/ vacs  Current w/ colonosopy and PSA  Pt is on shingrix waiting list      HPI:  Patient Active Problem List   Diagnosis    Benign essential hypertension    Abnormal blood sugar    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([facility] [address] )    Arthritis     Past Medical History:   Diagnosis Date    Abnormal blood chemistry     last assessed: 5/1/2013    Abnormal blood sugar     last assessed: 12/18/2014    Allergic rhinitis     last assessed: 11/8/2013    Diabetes mellitus ([address] )     last assessed: 4/30/2013    Nodule of finger of right hand     last assessed: 10/19/2015    Obstruction of right eustachian tube     last assessed: 10/11/2016     History reviewed  No pertinent surgical history    Family History   Problem Relation Age of Onset    No Known Problems Mother      History   Smoking Status    Never Smoker   Smokeless Tobacco    Never Used     History   Alcohol Use No      History   Drug Use No         Current Outpatient Prescriptions   Medication Sig Dispense Refill    Azelastine HCl 0 15 % SOLN USE 1 SPRAY EACH NOSTRIL IN THE MORNING AND IN THE EVENING  6    cholecalciferol (VITAMIN D3) 1,000 units tablet Take 1 tablet by mouth daily      clobetasol (TEMOVATE) 0 05 % ointment APPLY TWICE A DAY FOR 2 WEEKS, THEN AS NEEDED FLARES  1    glucosamine-chondroitin (COSAMIN DS) 500-400 MG tablet Take 1 tablet by mouth 3 (three) times a day      Multiple Vitamins-Minerals (CENTRUM SILVER 50+MEN PO) Take 1 tablet by mouth daily        quinapril (ACCUPRIL) 20 mg tablet TAKE 1 TABLET DAILY AS NEEDED 90 tablet 3    aspirin 81 MG tablet Take 1 tablet by mouth daily      Zoster Vac Recomb Adjuvanted (SHINGRIX) 50 MCG/0 5ML SUSR Inject 1 Syringe into a muscle once for 1 dose 1 each 0     No current facility-administered medications for this visit  Allergies   Allergen Reactions    Amoxicillin Diarrhea     Immunization History   Administered Date(s) Administered    Influenza 09/22/2015, 09/19/2016, 09/15/2018    Influenza Split High Dose Preservative Free IM 10/10/2014, 09/22/2015, 09/19/2016, 08/31/2017    Influenza TIV (IM) 09/14/2012    Pneumococcal Conjugate 13-Valent 07/09/2015    Pneumococcal Polysaccharide PPV23 11/01/2006    Tdap 09/01/2010    Zoster 07/18/2008       Patient Care Team:  Elisha Benoit DO as PCP - General    Medicare Screening Tests and Risk Assessments:      Health Risk Assessment:  Patient rates overall health as good  Patient feels that their physical health rating is Same  Eyesight was rated as Same  Hearing was rated as Same  Patient feels that their emotional and mental health rating is Same  Pain experienced by patient in the last 7 days has been None  Patient states that he has experienced no weight loss or gain in last 6 months  Emotional/Mental Health:  Patient has been feeling nervous/anxious  PHQ-9 Depression Screening:    Frequency of the following problems over the past two weeks:      1  Little interest or pleasure in doing things: 0 - not at all      2   Feeling down, depressed, or hopeless: 0 - not at all  PHQ-2 Score: 0          Broken Bones/Falls: Fall Risk Assessment:    In the past year, patient has experienced: No history of falling in past year          Bladder/Bowel:  Patient has not leaked urine accidently in the last six months  Patient reports no loss of bowel control  Immunizations:  Patient has had a flu vaccination within the last year  Patient has received a pneumonia shot  Patient has received a shingles shot  Patient has received tetanus/diphtheria shot  Date of tetanus/diphtheria shot: 9/1/2010    Home Safety:  Patient does not have trouble with stairs inside or outside of their home  Patient currently reports that there are safety hazards present in home , working smoke alarms, working carbon monoxide detectors  Preventative Screenings:   prostate cancer screen performed, 7/16/2018  colon cancer screen completed, 12/29/2017  cholesterol screen completed, 7/16/2018  glaucoma eye exam completed, 5/21/2018      Nutrition:  Current diet: Regular with servings of the following:    Medications:  Patient is currently taking over-the-counter supplements  List of OTC medications includes: Multi Vitamins  Patient is able to manage medications  Lifestyle Choices:  Patient reports no tobacco use  Patient has not smoked or used tobacco in the past   Patient reports no alcohol use  Patient drives a vehicle  Patient wears seat belt  Current level of exercise of physical activity described by patient as: Daily  Activities of Daily Living:  Can get out of bed by his or her self, able to dress self, able to make own meals, able to do own shopping, able to bathe self, can do own laundry/housekeeping, can manage own money, pay bills and track expenses    Previous Hospitalizations:  No hospitalization or ED visit in past 12 months        Advanced Directives:  Patient has decided on a power of   Patient has spoken to designated power of     Patient has completed advanced directive  Preventative Screening/Counseling:      Cardiovascular:      General: Risks and Benefits Discussed          Diabetes:      General: Risks and Benefits Discussed          Colorectal Cancer:      General: Risks and Benefits Discussed          Prostate Cancer:      General: Risks and Benefits Discussed          Osteoporosis:      General: Risks and Benefits Discussed          AAA:      General: Risks and Benefits Discussed          Glaucoma:      General: Risks and Benefits Discussed          HIV:      General: Risks and Benefits Discussed          Hepatitis C:      General: Risks and Benefits Discussed        Advanced Directives:   Patient has living will for healthcare, Information on ACP and/or AD provided  End of life assessment reviewed with patient  Immunizations:      Influenza: Risks & Benefits Discussed and Influenza UTD This Year      Pneumococcal: Risks & Benefits Discussed and Lifetime Vaccine Completed      Shingrix: Risks & Benefits Discussed      TD: Risks & Benefits Discussed        Referrals:  Referral(s) to: Nutrition        No exam data present    Physical Exam:  Review of Systems   Gastrointestinal: Negative for bowel incontinence  Psychiatric/Behavioral: The patient is not nervous/anxious  Vitals:    01/21/19 1358   BP: 104/70   BP Location: Left arm   Patient Position: Sitting   Cuff Size: Adult   Pulse: 70   Resp: 17   Temp: 97 5 °F (36 4 °C)   TempSrc: Tympanic   SpO2: 99%   Weight: 103 kg (227 lb 3 oz)   Height: 5' 11 65" (1 82 m)   Body mass index is 31 11 kg/m²      Physical Exam

## 2019-01-21 NOTE — PROGRESS NOTES
[facility]      NAME: Sandra Pickett  AGE: 75 y o  SEX: male  : 1945   MRN: [id number]    DATE: 2019  TIME: 2:25 PM    Assessment and Plan     Problem List Items Addressed This Visit     Benign essential hypertension     Controlled on quinapril 20 mg daily         Relevant Orders    CBC and differential    Abnormal blood sugar     Last A1c 5 5%  Continue diet and exercise  Will continue to monitor yearly         Relevant Orders    Comprehensive metabolic panel    Hemoglobin A1C    Lipid Panel with Direct LDL reflex    TSH, 3rd generation with Free T4 reflex    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(HCC)     Stable  Patient no longer sees dermatologist in [place]  He is now being managed by his local dermatologist, Dr Vincenzo Cooley with various joint pains but overall stable on glucosamine  I advised him to restart his Omega 3 and increase his activity level           Other Visit Diagnoses     Encounter for Medicare annual wellness exam    -  Primary    Need for vaccination        Relevant Medications    Zoster Vac Recomb Adjuvanted ([address]) 48 MCG/0 5ML SUSR    Screening for prostate cancer        Relevant Orders    PSA, Total Screen              Return to office in:  6 months, yearly fasting blood work prior at [facility] Complaint   Patient presents with    Follow-up     6m check up to chronic conditions, with no recent labs       History of Present Illness     Patient presents for recheck of chronic medical problems today  Overall is feeling well without complaints  Doing well on quinapril for hypertension  Still having various arthritic pains he has been taking glucosamine but does not remember to take his Krill oil often    Still sees dermatologist regularly        The following portions of the patient's history were reviewed and updated as appropriate: allergies, current medications, past family history, past medical history, past social history, past surgical history and problem list     Review of Systems   Review of Systems   Respiratory: Negative  Cardiovascular: Negative  Gastrointestinal: Negative  Genitourinary: Negative  Active Problem List     Patient Active Problem List   Diagnosis    Benign essential hypertension    Abnormal blood sugar    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(HCC)    Arthritis       Objective   /70 (BP Location: Left arm, Patient Position: Sitting, Cuff Size: Adult)   Pulse 70   Temp 97 5 °F (36 4 °C) (Tympanic)   Resp 17   Ht 5' 11 65" (1 82 m)   Wt 103 kg (227 lb 3 oz)   SpO2 99%   BMI 31 11 kg/m²     Physical Exam   Cardiovascular: Normal rate, regular rhythm, normal heart sounds and intact distal pulses  Carotids: no bruits  Ext: no edema   Pulmonary/Chest: Effort normal  No respiratory distress  He has no wheezes  He has no rales  Psychiatric: He has a normal mood and affect   His behavior is normal  Thought content normal        Pertinent Laboratory/Diagnostic Studies:  None    Current Medications     Current Outpatient Prescriptions:     Azelastine HCl 0 15 % SOLN, USE 1 SPRAY EACH NOSTRIL IN THE MORNING AND IN THE EVENING, Disp: , Rfl: 6    cholecalciferol (VITAMIN D3) 1,000 units tablet, Take 1 tablet by mouth daily, Disp: , Rfl:     clobetasol (TEMOVATE) 0 05 % ointment, APPLY TWICE A DAY FOR 2 WEEKS, THEN AS NEEDED FLARES, Disp: , Rfl: 1    glucosamine-chondroitin (COSAMIN DS) 500-400 MG tablet, Take 1 tablet by mouth 3 (three) times a day, Disp: , Rfl:     Multiple Vitamins-Minerals (CENTRUM SILVER 50+MEN PO), Take 1 tablet by mouth daily  , Disp: , Rfl:     quinapril (ACCUPRIL) 20 mg tablet, TAKE 1 TABLET DAILY AS NEEDED, Disp: 90 tablet, Rfl: 3    aspirin 81 MG tablet, Take 1 tablet by mouth daily, Disp: , Rfl:     Zoster Vac Recomb Adjuvanted (SHINGRIX) 50 MCG/0 5ML SUSR, Inject 1 Syringe into a muscle once for 1 dose, Disp: 1 each, Rfl: 0    Health Maintenance Health Maintenance   Topic Date Due   [facility] Annual Wellness Visit (AWV)  1945    Pneumococcal PPSV23/PCV13 65+ Years / High and Highest Risk (2 of 2 - PPSV23) 09/03/2015    Fall Risk  01/21/2020    Depression Screening PHQ  01/21/2020    DTaP,Tdap,and Td Vaccines (2 - Td) 09/01/2020    CRC Screening: Colonoscopy  12/29/2027    INFLUENZA VACCINE  Completed     Immunization History   Administered Date(s) Administered    Influenza 09/22/2015, 09/19/2016, 09/15/2018    Influenza Split High Dose Preservative Free IM 10/10/2014, 09/22/2015, 09/19/2016, 08/31/2017    Influenza TIV (IM) 09/14/2012    Pneumococcal Conjugate 13-Valent 07/09/2015    Pneumococcal Polysaccharide PPV23 11/01/2006    Tdap 09/01/2010    Zoster 07/18/2008       Alli Li DO  [facility]

## 2019-03-06 ENCOUNTER — TRANSCRIBE ORDERS (OUTPATIENT)
Dept: ADMINISTRATIVE | Facility: HOSPITAL | Age: 74
End: 2019-03-06

## 2019-03-06 ENCOUNTER — APPOINTMENT (OUTPATIENT)
Dept: LAB | Facility: MEDICAL CENTER | Age: 74
End: 2019-03-06
Payer: MEDICARE

## 2019-03-06 DIAGNOSIS — C86.6 LYMPHOMATOID PAPULOSIS (HCC): Primary | ICD-10-CM

## 2019-03-06 DIAGNOSIS — C86.6 LYMPHOMATOID PAPULOSIS (HCC): ICD-10-CM

## 2019-03-06 LAB
ALBUMIN SERPL BCP-MCNC: 4.1 G/DL (ref 3.5–5)
ALP SERPL-CCNC: 56 U/L (ref 46–116)
ALT SERPL W P-5'-P-CCNC: 36 U/L (ref 12–78)
ANION GAP SERPL CALCULATED.3IONS-SCNC: 3 MMOL/L (ref 4–13)
AST SERPL W P-5'-P-CCNC: 26 U/L (ref 5–45)
BASOPHILS # BLD AUTO: 0.02 THOUSANDS/ΜL (ref 0–0.1)
BASOPHILS NFR BLD AUTO: 0 % (ref 0–1)
BILIRUB SERPL-MCNC: 0.59 MG/DL (ref 0.2–1)
BUN SERPL-MCNC: 12 MG/DL (ref 5–25)
CALCIUM SERPL-MCNC: 9.2 MG/DL (ref 8.3–10.1)
CHLORIDE SERPL-SCNC: 107 MMOL/L (ref 100–108)
CO2 SERPL-SCNC: 31 MMOL/L (ref 21–32)
CREAT SERPL-MCNC: 1.03 MG/DL (ref 0.6–1.3)
EOSINOPHIL # BLD AUTO: 0.33 THOUSAND/ΜL (ref 0–0.61)
EOSINOPHIL NFR BLD AUTO: 6 % (ref 0–6)
ERYTHROCYTE [DISTWIDTH] IN BLOOD BY AUTOMATED COUNT: 14.2 % (ref 11.6–15.1)
GFR SERPL CREATININE-BSD FRML MDRD: 71 ML/MIN/1.73SQ M
GLUCOSE SERPL-MCNC: 94 MG/DL (ref 65–140)
HCT VFR BLD AUTO: 49.6 % (ref 36.5–49.3)
HGB BLD-MCNC: 16.1 G/DL (ref 12–17)
IMM GRANULOCYTES # BLD AUTO: 0.01 THOUSAND/UL (ref 0–0.2)
IMM GRANULOCYTES NFR BLD AUTO: 0 % (ref 0–2)
LYMPHOCYTES # BLD AUTO: 1.59 THOUSANDS/ΜL (ref 0.6–4.47)
LYMPHOCYTES NFR BLD AUTO: 30 % (ref 14–44)
MCH RBC QN AUTO: 30.4 PG (ref 26.8–34.3)
MCHC RBC AUTO-ENTMCNC: 32.5 G/DL (ref 31.4–37.4)
MCV RBC AUTO: 94 FL (ref 82–98)
MONOCYTES # BLD AUTO: 0.77 THOUSAND/ΜL (ref 0.17–1.22)
MONOCYTES NFR BLD AUTO: 14 % (ref 4–12)
NEUTROPHILS # BLD AUTO: 2.61 THOUSANDS/ΜL (ref 1.85–7.62)
NEUTS SEG NFR BLD AUTO: 50 % (ref 43–75)
NRBC BLD AUTO-RTO: 0 /100 WBCS
PLATELET # BLD AUTO: 215 THOUSANDS/UL (ref 149–390)
PMV BLD AUTO: 9.5 FL (ref 8.9–12.7)
POTASSIUM SERPL-SCNC: 4.3 MMOL/L (ref 3.5–5.3)
PROT SERPL-MCNC: 8 G/DL (ref 6.4–8.2)
RBC # BLD AUTO: 5.29 MILLION/UL (ref 3.88–5.62)
SODIUM SERPL-SCNC: 141 MMOL/L (ref 136–145)
WBC # BLD AUTO: 5.33 THOUSAND/UL (ref 4.31–10.16)

## 2019-03-06 PROCEDURE — 86360 T CELL ABSOLUTE COUNT/RATIO: CPT

## 2019-03-06 PROCEDURE — 85025 COMPLETE CBC W/AUTO DIFF WBC: CPT

## 2019-03-06 PROCEDURE — 36415 COLL VENOUS BLD VENIPUNCTURE: CPT

## 2019-03-06 PROCEDURE — 80053 COMPREHEN METABOLIC PANEL: CPT

## 2019-03-08 LAB
BASOPHILS # BLD AUTO: 0 X10E3/UL (ref 0–0.2)
BASOPHILS NFR BLD AUTO: 0 %
CD3+CD4+ CELLS # BLD: 910 /UL (ref 359–1519)
CD3+CD4+ CELLS NFR BLD: 56.9 % (ref 30.8–58.5)
CD3+CD4+ CELLS/CD3+CD8+ CLL BLD: 3.21 % (ref 0.92–3.72)
CD3+CD8+ CELLS # BLD: 283 /UL (ref 109–897)
CD3+CD8+ CELLS NFR BLD: 17.7 % (ref 12–35.5)
EOSINOPHIL # BLD AUTO: 0.3 X10E3/UL (ref 0–0.4)
EOSINOPHIL NFR BLD AUTO: 7 %
ERYTHROCYTE [DISTWIDTH] IN BLOOD BY AUTOMATED COUNT: 14.3 % (ref 12.3–15.4)
HCT VFR BLD AUTO: 48 % (ref 37.5–51)
HGB BLD-MCNC: 16.5 G/DL (ref 13–17.7)
IMM GRANULOCYTES # BLD: 0 X10E3/UL (ref 0–0.1)
IMM GRANULOCYTES NFR BLD: 0 %
LYMPHOCYTES # BLD AUTO: 1.6 X10E3/UL (ref 0.7–3.1)
LYMPHOCYTES NFR BLD AUTO: 32 %
MCH RBC QN AUTO: 31.9 PG (ref 26.6–33)
MCHC RBC AUTO-ENTMCNC: 34.4 G/DL (ref 31.5–35.7)
MCV RBC AUTO: 93 FL (ref 79–97)
MONOCYTES # BLD AUTO: 0.7 X10E3/UL (ref 0.1–0.9)
MONOCYTES NFR BLD AUTO: 14 %
NEUTROPHILS # BLD AUTO: 2.4 X10E3/UL (ref 1.4–7)
NEUTROPHILS NFR BLD AUTO: 47 %
PLATELET # BLD AUTO: 211 X10E3/UL (ref 150–379)
RBC # BLD AUTO: 5.18 X10E6/UL (ref 4.14–5.8)
WBC # BLD AUTO: 5.1 X10E3/UL (ref 3.4–10.8)

## 2019-03-30 ENCOUNTER — OFFICE VISIT (OUTPATIENT)
Dept: FAMILY MEDICINE CLINIC | Facility: CLINIC | Age: 74
End: 2019-03-30
Payer: MEDICARE

## 2019-03-30 VITALS
TEMPERATURE: 98.4 F | HEART RATE: 79 BPM | WEIGHT: 227.8 LBS | BODY MASS INDEX: 30.85 KG/M2 | OXYGEN SATURATION: 98 % | HEIGHT: 72 IN | SYSTOLIC BLOOD PRESSURE: 118 MMHG | DIASTOLIC BLOOD PRESSURE: 84 MMHG | RESPIRATION RATE: 15 BRPM

## 2019-03-30 DIAGNOSIS — N40.1 BENIGN PROSTATIC HYPERPLASIA WITH URINARY HESITANCY: ICD-10-CM

## 2019-03-30 DIAGNOSIS — R10.31 RIGHT LOWER QUADRANT PAIN: Primary | ICD-10-CM

## 2019-03-30 DIAGNOSIS — R39.11 BENIGN PROSTATIC HYPERPLASIA WITH URINARY HESITANCY: ICD-10-CM

## 2019-03-30 PROCEDURE — 99214 OFFICE O/P EST MOD 30 MIN: CPT | Performed by: FAMILY MEDICINE

## 2019-03-30 RX ORDER — TAMSULOSIN HYDROCHLORIDE 0.4 MG/1
0.4 CAPSULE ORAL
Qty: 90 CAPSULE | Refills: 1 | Status: SHIPPED | OUTPATIENT
Start: 2019-03-30 | End: 2019-09-21 | Stop reason: SDUPTHER

## 2019-03-30 NOTE — ASSESSMENT & PLAN NOTE
Pt presents w/ 4 day history of RLQ abd pain  Pt was laying in bed a few nights ago, went to roll over, then felt a sudden "pull"  Since then, he has had pain w/ certain movements  Appetite ok, no n/v/d/c  No bloody stools  No new urinary symtoms  PSH: inguinal hernia repain in 2007  His examination today is essentially benign  He exhibits very slight right lower quadrant tenderness, but no rebound, rigidity, or guarding  Bowel sounds are normal   Most likely musculoskeletal versus strain of mesh material from prior inguinal hernia repair in 2007    He was instructed to go directly to the emergency department should his symptoms worsen, otherwise watchful waiting

## 2019-03-30 NOTE — ASSESSMENT & PLAN NOTE
Patient also would like to discuss ongoing BPH symptoms  Urinary hesitancy, frequency  Nocturia (2 times per night)  Patient has never tried pharmacotherapy  His last PSA was 1 6 July 2018  Discussed trial of tamsulosin versus finasteride  Will start with tamsulosin 0 4 mg daily  Patient instructed to call in a few weeks to report  Consider adding finasteride if needed    Also consider referral to urologist if symptoms worsen

## 2019-03-30 NOTE — PROGRESS NOTES
[facility]      NAME: Bijal Ward  AGE: 75 y o  SEX: male  : 1945   MRN: [id number]    DATE: 3/30/2019  TIME: 1:08 PM    Assessment and Plan     Problem List Items Addressed This Visit     Benign prostatic hyperplasia with urinary hesitancy     Patient also would like to discuss ongoing BPH symptoms  Urinary hesitancy, frequency  Nocturia (2 times per night)  Patient has never tried pharmacotherapy  His last PSA was   Discussed trial of tamsulosin versus finasteride  Will start with tamsulosin 0 4 mg daily  Patient instructed to call in a few weeks to report  Consider adding finasteride if needed  Also consider referral to urologist if symptoms worsen             Relevant Medications    tamsulosin (FLOMAX) 0 4 mg    Right lower quadrant pain - Primary     Pt presents w/ 4 day history of RLQ abd pain  Pt was laying in bed a few nights ago, went to roll over, then felt a sudden "pull"  Since then, he has had pain w/ certain movements  Appetite ok, no n/v/d/c  No bloody stools  No new urinary symtoms  PSH: inguinal hernia repain in   His examination today is essentially benign  He exhibits very slight right lower quadrant tenderness, but no rebound, rigidity, or guarding  Bowel sounds are normal   Most likely musculoskeletal versus strain of mesh material from prior inguinal hernia repair in   He was instructed to go directly to the emergency department should his symptoms worsen, otherwise watchful waiting                       Return to office in:  Keep next regularly scheduled 6 month appointment    Chief Complaint     Chief Complaint   Patient presents with    Abdominal Pain     left lower quadrant for the past few days        History of Present Illness     Pt presents w/ a few day history of RLQ abd pain  Pt was laying in bed a few nights ago, went to roll over, then felt a sudden "pull"  Since then, he has had pain w/ certain movements   Appetite ok, no n/v/d/c  No bloody stools  No new urinary symtoms  PSH: inguinal hernia repain in 2007  Patient also would like to discuss ongoing BPH symptoms  Urinary hesitancy, frequency  Nocturia (2 times per night)  Patient has never tried pharmacotherapy  The following portions of the patient's history were reviewed and updated as appropriate: allergies, current medications, past family history, past medical history, past social history, past surgical history and problem list     Review of Systems   Review of Systems   Respiratory: Negative  Cardiovascular: Negative  Gastrointestinal: Positive for abdominal pain  Negative for blood in stool, constipation, diarrhea, nausea and vomiting  Genitourinary: Positive for difficulty urinating and frequency  Active Problem List     Patient Active Problem List   Diagnosis    Benign essential hypertension    Abnormal blood sugar    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(HCC)    Arthritis    Benign prostatic hyperplasia with urinary hesitancy    Right lower quadrant pain       Objective   /84 (BP Location: Left arm, Patient Position: Sitting, Cuff Size: Adult)   Pulse 79   Temp 98 4 °F (36 9 °C) (Tympanic)   Resp 15   Ht 5' 11 6" (1 819 m)   Wt 103 kg (227 lb 12 8 oz)   SpO2 98%   BMI 31 24 kg/m²     Physical Exam   Cardiovascular: Normal rate, regular rhythm, normal heart sounds and intact distal pulses  Carotids: no bruits  Ext: no edema   Pulmonary/Chest: Effort normal  No respiratory distress  He has no wheezes  He has no rales  Abdominal: Bowel sounds are normal    Very mild right lower quadrant tenderness  No rebound, rigidity, or guarding  Normal bowel sounds  Psychiatric: He has a normal mood and affect   His behavior is normal  Thought content normal        Pertinent Laboratory/Diagnostic Studies:  PSA from July 2018 1 6    Current Medications     Current Outpatient Medications:     aspirin 81 MG tablet, Take 1 tablet by mouth daily, Disp: , Rfl:     Azelastine HCl 0 15 % SOLN, USE 1 SPRAY EACH NOSTRIL IN THE MORNING AND IN THE EVENING, Disp: , Rfl: 6    cholecalciferol (VITAMIN D3) 1,000 units tablet, Take 1 tablet by mouth daily, Disp: , Rfl:     clobetasol (TEMOVATE) 0 05 % ointment, APPLY TWICE A DAY FOR 2 WEEKS, THEN AS NEEDED FLARES, Disp: , Rfl: 1    glucosamine-chondroitin (COSAMIN DS) 500-400 MG tablet, Take 1 tablet by mouth 3 (three) times a day, Disp: , Rfl:     Multiple Vitamins-Minerals (CENTRUM SILVER 50+MEN PO), Take 1 tablet by mouth daily  , Disp: , Rfl:     quinapril (ACCUPRIL) 20 mg tablet, TAKE 1 TABLET DAILY AS NEEDED, Disp: 90 tablet, Rfl: 3    tamsulosin (FLOMAX) 0 4 mg, Take 1 capsule (0 4 mg total) by mouth daily with dinner, Disp: 90 capsule, Rfl: 1    Health Maintenance     Health Maintenance   Topic Date Due    BMI: Followup Plan  01/16/1962    Pneumococcal PPSV23/PCV13 65+ Years / High and Highest Risk (2 of 2 - PPSV23) 09/03/2015    Fall Risk  01/21/2020    Depression Screening PHQ  01/21/2020    Medicare Annual Wellness Visit (AWV)  01/21/2020    BMI: Adult  03/30/2020    DTaP,Tdap,and Td Vaccines (2 - Td) 09/01/2020    CRC Screening: Colonoscopy  12/29/2027    INFLUENZA VACCINE  Completed    HEPATITIS B VACCINES  Aged Out     Immunization History   Administered Date(s) Administered    INFLUENZA 09/22/2015, 09/19/2016, 09/15/2018    Influenza Split High Dose Preservative Free IM 10/10/2014, 09/22/2015, 09/19/2016, 08/31/2017    Influenza TIV (IM) 09/14/2012    Pneumococcal Conjugate 13-Valent 07/09/2015    Pneumococcal Polysaccharide PPV23 11/01/2006    Tdap 09/01/2010    Zoster 07/18/2008       Arnold Mcardle, DO  [facility]

## 2019-04-03 NOTE — ASSESSMENT & PLAN NOTE
History of lymphomatosis  Papulosis  Being followed by his dermatologist, Dr Shanice House   Recent flare  Patient was started on doxycycline  May be referred back to specialist in [place] if flare-up continues  The patient found out on Monday that they had lice. (Her daughter also). She said nits.  They did a treatment on Monday night and do not see any bugs.  But she wants to discuss with you to make sure she did everything okay and if there is something else they should do.  Please call back to discuss.

## 2019-05-15 ENCOUNTER — OFFICE VISIT (OUTPATIENT)
Dept: FAMILY MEDICINE CLINIC | Facility: CLINIC | Age: 74
End: 2019-05-15
Payer: MEDICARE

## 2019-05-15 VITALS
BODY MASS INDEX: 31.95 KG/M2 | HEART RATE: 79 BPM | TEMPERATURE: 97.5 F | SYSTOLIC BLOOD PRESSURE: 136 MMHG | WEIGHT: 228.2 LBS | DIASTOLIC BLOOD PRESSURE: 82 MMHG | HEIGHT: 71 IN | OXYGEN SATURATION: 98 %

## 2019-05-15 DIAGNOSIS — J30.2 SEASONAL ALLERGIES: ICD-10-CM

## 2019-05-15 DIAGNOSIS — R09.81 NASAL CONGESTION: ICD-10-CM

## 2019-05-15 DIAGNOSIS — R04.0 BLEEDING NOSE: ICD-10-CM

## 2019-05-15 DIAGNOSIS — R06.02 SHORTNESS OF BREATH: Primary | ICD-10-CM

## 2019-05-15 PROCEDURE — 99213 OFFICE O/P EST LOW 20 MIN: CPT | Performed by: FAMILY MEDICINE

## 2019-05-15 RX ORDER — MONTELUKAST SODIUM 10 MG/1
10 TABLET ORAL
Qty: 30 TABLET | Refills: 0 | Status: SHIPPED | OUTPATIENT
Start: 2019-05-15 | End: 2019-06-11 | Stop reason: SDUPTHER

## 2019-05-16 ENCOUNTER — TELEPHONE (OUTPATIENT)
Dept: FAMILY MEDICINE CLINIC | Facility: CLINIC | Age: 74
End: 2019-05-16

## 2019-05-16 DIAGNOSIS — J01.90 ACUTE BACTERIAL SINUSITIS: Primary | ICD-10-CM

## 2019-05-16 DIAGNOSIS — B96.89 ACUTE BACTERIAL SINUSITIS: Primary | ICD-10-CM

## 2019-05-16 RX ORDER — AZITHROMYCIN 250 MG/1
TABLET, FILM COATED ORAL
Qty: 6 TABLET | Refills: 0 | Status: SHIPPED | OUTPATIENT
Start: 2019-05-16 | End: 2019-05-20

## 2019-06-11 DIAGNOSIS — J30.2 SEASONAL ALLERGIES: ICD-10-CM

## 2019-06-11 DIAGNOSIS — R06.02 SHORTNESS OF BREATH: ICD-10-CM

## 2019-06-12 RX ORDER — MONTELUKAST SODIUM 10 MG/1
10 TABLET ORAL
Qty: 30 TABLET | Refills: 0 | Status: SHIPPED | OUTPATIENT
Start: 2019-06-12 | End: 2019-07-10 | Stop reason: SDUPTHER

## 2019-07-10 DIAGNOSIS — J30.2 SEASONAL ALLERGIES: ICD-10-CM

## 2019-07-10 DIAGNOSIS — R06.02 SHORTNESS OF BREATH: ICD-10-CM

## 2019-07-10 RX ORDER — MONTELUKAST SODIUM 10 MG/1
10 TABLET ORAL
Qty: 30 TABLET | Refills: 0 | Status: SHIPPED | OUTPATIENT
Start: 2019-07-10 | End: 2019-09-11

## 2019-07-16 ENCOUNTER — APPOINTMENT (OUTPATIENT)
Dept: LAB | Facility: CLINIC | Age: 74
End: 2019-07-16
Payer: MEDICARE

## 2019-07-16 DIAGNOSIS — R73.09 ABNORMAL BLOOD SUGAR: ICD-10-CM

## 2019-07-16 DIAGNOSIS — I10 BENIGN ESSENTIAL HYPERTENSION: ICD-10-CM

## 2019-07-16 DIAGNOSIS — Z12.5 SCREENING FOR PROSTATE CANCER: ICD-10-CM

## 2019-07-16 LAB
ALBUMIN SERPL BCP-MCNC: 3.8 G/DL (ref 3.5–5)
ALP SERPL-CCNC: 55 U/L (ref 46–116)
ALT SERPL W P-5'-P-CCNC: 26 U/L (ref 12–78)
ANION GAP SERPL CALCULATED.3IONS-SCNC: 2 MMOL/L (ref 4–13)
AST SERPL W P-5'-P-CCNC: 20 U/L (ref 5–45)
BASOPHILS # BLD AUTO: 0.02 THOUSANDS/ΜL (ref 0–0.1)
BASOPHILS NFR BLD AUTO: 0 % (ref 0–1)
BILIRUB SERPL-MCNC: 0.63 MG/DL (ref 0.2–1)
BUN SERPL-MCNC: 11 MG/DL (ref 5–25)
CALCIUM SERPL-MCNC: 9.1 MG/DL (ref 8.3–10.1)
CHLORIDE SERPL-SCNC: 107 MMOL/L (ref 100–108)
CHOLEST SERPL-MCNC: 155 MG/DL (ref 50–200)
CO2 SERPL-SCNC: 28 MMOL/L (ref 21–32)
CREAT SERPL-MCNC: 0.98 MG/DL (ref 0.6–1.3)
EOSINOPHIL # BLD AUTO: 0.35 THOUSAND/ΜL (ref 0–0.61)
EOSINOPHIL NFR BLD AUTO: 7 % (ref 0–6)
ERYTHROCYTE [DISTWIDTH] IN BLOOD BY AUTOMATED COUNT: 14.3 % (ref 11.6–15.1)
EST. AVERAGE GLUCOSE BLD GHB EST-MCNC: 123 MG/DL
GFR SERPL CREATININE-BSD FRML MDRD: 75 ML/MIN/1.73SQ M
GLUCOSE P FAST SERPL-MCNC: 96 MG/DL (ref 65–99)
HBA1C MFR BLD: 5.9 % (ref 4.2–6.3)
HCT VFR BLD AUTO: 44.8 % (ref 36.5–49.3)
HDLC SERPL-MCNC: 50 MG/DL (ref 40–60)
HGB BLD-MCNC: 15.1 G/DL (ref 12–17)
IMM GRANULOCYTES # BLD AUTO: 0.01 THOUSAND/UL (ref 0–0.2)
IMM GRANULOCYTES NFR BLD AUTO: 0 % (ref 0–2)
LDLC SERPL CALC-MCNC: 90 MG/DL (ref 0–100)
LYMPHOCYTES # BLD AUTO: 1.61 THOUSANDS/ΜL (ref 0.6–4.47)
LYMPHOCYTES NFR BLD AUTO: 30 % (ref 14–44)
MCH RBC QN AUTO: 30.9 PG (ref 26.8–34.3)
MCHC RBC AUTO-ENTMCNC: 33.7 G/DL (ref 31.4–37.4)
MCV RBC AUTO: 92 FL (ref 82–98)
MONOCYTES # BLD AUTO: 0.62 THOUSAND/ΜL (ref 0.17–1.22)
MONOCYTES NFR BLD AUTO: 12 % (ref 4–12)
NEUTROPHILS # BLD AUTO: 2.77 THOUSANDS/ΜL (ref 1.85–7.62)
NEUTS SEG NFR BLD AUTO: 51 % (ref 43–75)
NRBC BLD AUTO-RTO: 0 /100 WBCS
PLATELET # BLD AUTO: 203 THOUSANDS/UL (ref 149–390)
PMV BLD AUTO: 9.9 FL (ref 8.9–12.7)
POTASSIUM SERPL-SCNC: 4 MMOL/L (ref 3.5–5.3)
PROT SERPL-MCNC: 7.8 G/DL (ref 6.4–8.2)
PSA SERPL-MCNC: 2.7 NG/ML (ref 0–4)
RBC # BLD AUTO: 4.88 MILLION/UL (ref 3.88–5.62)
SODIUM SERPL-SCNC: 137 MMOL/L (ref 136–145)
TRIGL SERPL-MCNC: 75 MG/DL
TSH SERPL DL<=0.05 MIU/L-ACNC: 1.26 UIU/ML (ref 0.36–3.74)
WBC # BLD AUTO: 5.38 THOUSAND/UL (ref 4.31–10.16)

## 2019-07-16 PROCEDURE — 80061 LIPID PANEL: CPT

## 2019-07-16 PROCEDURE — 85025 COMPLETE CBC W/AUTO DIFF WBC: CPT

## 2019-07-16 PROCEDURE — 83036 HEMOGLOBIN GLYCOSYLATED A1C: CPT

## 2019-07-16 PROCEDURE — 36415 COLL VENOUS BLD VENIPUNCTURE: CPT

## 2019-07-16 PROCEDURE — G0103 PSA SCREENING: HCPCS

## 2019-07-16 PROCEDURE — 80053 COMPREHEN METABOLIC PANEL: CPT

## 2019-07-16 PROCEDURE — 84443 ASSAY THYROID STIM HORMONE: CPT

## 2019-07-24 ENCOUNTER — OFFICE VISIT (OUTPATIENT)
Dept: FAMILY MEDICINE CLINIC | Facility: CLINIC | Age: 74
End: 2019-07-24
Payer: MEDICARE

## 2019-07-24 VITALS
HEART RATE: 96 BPM | TEMPERATURE: 97.7 F | WEIGHT: 222 LBS | DIASTOLIC BLOOD PRESSURE: 64 MMHG | HEIGHT: 72 IN | OXYGEN SATURATION: 94 % | SYSTOLIC BLOOD PRESSURE: 122 MMHG | BODY MASS INDEX: 30.07 KG/M2

## 2019-07-24 DIAGNOSIS — R73.09 ABNORMAL BLOOD SUGAR: Primary | ICD-10-CM

## 2019-07-24 DIAGNOSIS — M19.90 ARTHRITIS: ICD-10-CM

## 2019-07-24 DIAGNOSIS — I10 BENIGN ESSENTIAL HYPERTENSION: ICD-10-CM

## 2019-07-24 DIAGNOSIS — C85.80: ICD-10-CM

## 2019-07-24 PROCEDURE — 99214 OFFICE O/P EST MOD 30 MIN: CPT | Performed by: FAMILY MEDICINE

## 2019-07-24 NOTE — ASSESSMENT & PLAN NOTE
Stable  Patient no longer sees dermatologist at [place] [facility] [address] [facility]    He continues to sees local dermatologist, Dr Ania Orellana

## 2019-07-24 NOTE — PROGRESS NOTES
[facility]      NAME: Mireya Morales  AGE: 75 y o  SEX: male  : 1945   MRN: [id number]    DATE: 2019  TIME: 1:26 PM    Assessment and Plan     Problem List Items Addressed This Visit     Benign essential hypertension     Controlled on quinapril 20         Abnormal blood sugar - Primary     Blood sugar 96, A1c 5 9%  Continue reduced carb diet exercise  Will continue to check yearly         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([address] )     Stable  Patient no longer sees dermatologist at [facility]  He continues to sees local dermatologist, Dr Piper Vogel glucosamine and Omega 3                   Return to office in:  6 months,  AWV, labs yearly (next 2020)    Chief Complaint     Chief Complaint   Patient presents with    Follow-up     Pt is here for a 6 month follow up  Pt has questions about medications  History of Present Illness     Patient presents for recheck of chronic medical problems today  Overall is feeling well  He had labs drawn on   Doing well on quinapril for blood pressure  Still sees dermatologist regularly due to chronic skin condition  The following portions of the patient's history were reviewed and updated as appropriate: allergies, current medications, past family history, past medical history, past social history, past surgical history and problem list     Review of Systems   Review of Systems   Respiratory: Negative  Cardiovascular: Negative  Gastrointestinal: Negative  Genitourinary: Negative          Active Problem List     Patient Active Problem List   Diagnosis    Benign essential hypertension    Abnormal blood sugar    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(HCC)    Arthritis    Benign prostatic hyperplasia with urinary hesitancy    Right lower quadrant pain       Objective   /64 (BP Location: Left arm, Patient Position: Sitting, Cuff Size: Adult)   Pulse 96   Temp 97 7 °F (36 5 °C) (Tympanic)  6' 0 24" (1 835 m)   Wt 101 kg (222 lb)   SpO2 94%   BMI 29 91 kg/m²     Physical Exam   Cardiovascular: Normal rate, regular rhythm, normal heart sounds and intact distal pulses  Carotids: no bruits  Ext: no edema   Pulmonary/Chest: Effort normal  No respiratory distress  He has no wheezes  He has no rales  Psychiatric: He has a normal mood and affect  His behavior is normal  Thought content normal        Pertinent Laboratory/Diagnostic Studies:  Labs from July 16th    Current Medications     Current Outpatient Medications:     aspirin 81 MG tablet, Take 1 tablet by mouth daily, Disp: , Rfl:     cholecalciferol (VITAMIN D3) 1,000 units tablet, Take 1 tablet by mouth daily, Disp: , Rfl:     clobetasol (TEMOVATE) 0 05 % ointment, APPLY TWICE A DAY FOR 2 WEEKS, THEN AS NEEDED FLARES, Disp: , Rfl: 1    glucosamine-chondroitin (COSAMIN DS) 500-400 MG tablet, Take 1 tablet by mouth 3 (three) times a day, Disp: , Rfl:     montelukast (SINGULAIR) 10 mg tablet, TAKE 1 TABLET (10 MG TOTAL) BY MOUTH DAILY AT BEDTIME, Disp: 30 tablet, Rfl: 0    Multiple Vitamins-Minerals (CENTRUM SILVER 50+MEN PO), Take 1 tablet by mouth daily  , Disp: , Rfl:     quinapril (ACCUPRIL) 20 mg tablet, TAKE 1 TABLET DAILY AS NEEDED, Disp: 90 tablet, Rfl: 3    tamsulosin (FLOMAX) 0 4 mg, Take 1 capsule (0 4 mg total) by mouth daily with dinner, Disp: 90 capsule, Rfl: 1    Azelastine HCl 0 15 % SOLN, USE 1 SPRAY EACH NOSTRIL IN THE MORNING AND IN THE EVENING, Disp: , Rfl: 6    fluticasone-salmeterol (ADVAIR DISKUS) 250-50 mcg/dose inhaler, Inhale 1 puff 2 (two) times a day Rinse mouth after use   (Patient not taking: Reported on 7/24/2019), Disp: 1 Inhaler, Rfl: 0    Health Maintenance     Health Maintenance   Topic Date Due    BMI: Followup Plan  01/16/1962    Pneumococcal Vaccine: 65+ Years (2 of 2 - PPSV23) 07/09/2016    INFLUENZA VACCINE  07/01/2019    Fall Risk  01/21/2020    Depression Screening PHQ  01/21/2020    Medicare Annual Wellness Visit (AWV)  01/21/2020    BMI: Adult  05/24/2020    DTaP,Tdap,and Td Vaccines (2 - Td) 09/01/2020    CRC Screening: Colonoscopy  12/29/2027    Pneumococcal Vaccine: Pediatrics (0 to 5 Years) and At-Risk Patients (6 to 59 Years)  Aged Out    HEPATITIS B VACCINES  Aged [employer] History   Administered Date(s) Administered    INFLUENZA 09/22/2015, 09/19/2016, 09/15/2018    Influenza Split High Dose Preservative Free IM 10/10/2014, 09/22/2015, 09/19/2016, 08/31/2017    Influenza TIV (IM) 09/14/2012    Pneumococcal Conjugate 13-Valent 07/09/2015    Pneumococcal Polysaccharide PPV23 11/01/2006    Tdap 09/01/2010    Zoster 07/18/2008    Zoster Vaccine Recombinant 04/15/2019, 06/17/2019       Isael Richey DO  [facility]

## 2019-09-11 ENCOUNTER — OFFICE VISIT (OUTPATIENT)
Dept: FAMILY MEDICINE CLINIC | Facility: CLINIC | Age: 74
End: 2019-09-11
Payer: MEDICARE

## 2019-09-11 ENCOUNTER — APPOINTMENT (OUTPATIENT)
Dept: RADIOLOGY | Facility: CLINIC | Age: 74
End: 2019-09-11
Payer: MEDICARE

## 2019-09-11 VITALS
HEIGHT: 71 IN | WEIGHT: 225 LBS | SYSTOLIC BLOOD PRESSURE: 120 MMHG | TEMPERATURE: 98 F | DIASTOLIC BLOOD PRESSURE: 70 MMHG | OXYGEN SATURATION: 96 % | HEART RATE: 79 BPM | RESPIRATION RATE: 16 BRPM | BODY MASS INDEX: 31.5 KG/M2

## 2019-09-11 DIAGNOSIS — S16.1XXA STRAIN OF NECK MUSCLE, INITIAL ENCOUNTER: ICD-10-CM

## 2019-09-11 DIAGNOSIS — S16.1XXA STRAIN OF NECK MUSCLE, INITIAL ENCOUNTER: Primary | ICD-10-CM

## 2019-09-11 DIAGNOSIS — M19.90 ARTHRITIS: ICD-10-CM

## 2019-09-11 PROCEDURE — 99214 OFFICE O/P EST MOD 30 MIN: CPT | Performed by: FAMILY MEDICINE

## 2019-09-11 PROCEDURE — 72050 X-RAY EXAM NECK SPINE 4/5VWS: CPT

## 2019-09-11 NOTE — ASSESSMENT & PLAN NOTE
History of chronic arthritis, especially in hips, and recently in right hand  No recent trauma  Recommend trial of glucosamine 1500 mg daily  Will x-ray hips if symptoms worsen

## 2019-09-11 NOTE — ASSESSMENT & PLAN NOTE
2 week hx of right post neck pain  No radiation  No trauma  Examination today reveals point tenderness right paravertebral musculature  Mildly reduced range of motion  No neurologic symptoms were appreciated  Will sent for x-rays of cervical spine  Possible referral to physical therapy versus advanced imaging  (Note:  Patient has had MRI cervical spine done approximately 5+ years ago showing degenerative disc disease)

## 2019-09-11 NOTE — PROGRESS NOTES
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, and recently in right hand  No recent trauma  Recommend trial of glucosamine 1500 mg daily  Will x-ray hips if symptoms worsen  Cervical strain - Primary     2 week hx of right post neck pain  No radiation  No trauma  Examination today reveals point tenderness right paravertebral musculature  Mildly reduced range of motion  No neurologic symptoms were appreciated  Will sent for x-rays of cervical spine  Possible referral to physical therapy versus advanced imaging  (Note:  Patient has had MRI cervical spine done approximately 5+ years ago showing degenerative disc disease)  Relevant Orders    XR spine cervical complete 4 or 5 vw non injury              Return to office in: will call w/ xray results    Chief Complaint     Chief Complaint   Patient presents with    Neck Pain     x 1 week       History of Present Illness     2 week hx of right post neck pain  No radiation  No trauma  Also pain in hips and right hand  This is been going on for quite some time  The following portions of the patient's history were reviewed and updated as appropriate: allergies, current medications, past family history, past medical history, past social history, past surgical history and problem list     Review of Systems   Review of Systems   Respiratory: Negative  Cardiovascular: Negative  Gastrointestinal: Negative  Genitourinary: Negative  Musculoskeletal: Positive for arthralgias and neck pain         Active Problem List     Patient Active Problem List   Diagnosis    Benign essential hypertension    Abnormal blood sugar    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(HCC)    Arthritis    Benign prostatic hyperplasia with urinary hesitancy    Right lower quadrant pain    Cervical strain       Objective   /70 (BP Location: Left arm, Patient Position: Sitting, Cuff Size: Adult)   Pulse 79   Temp 98 °F (36 7 °C) (Tympanic)   Resp 16   Ht 5' 10 87" (1 8 m)   Wt 102 kg (225 lb)   SpO2 96%   BMI 31 50 kg/m²     Physical Exam   Musculoskeletal:   Point tenderness right posterior paravertebral musculature  Mildly reduced range of motion, especially with right rotation         Pertinent Laboratory/Diagnostic Studies:  none    Current Medications     Current Outpatient Medications:     aspirin 81 MG tablet, Take 1 tablet by mouth daily, Disp: , Rfl:     Azelastine HCl 0 15 % SOLN, USE 1 SPRAY EACH NOSTRIL IN THE MORNING AND IN THE EVENING, Disp: , Rfl: 6    cholecalciferol (VITAMIN D3) 1,000 units tablet, Take 1 tablet by mouth daily, Disp: , Rfl:     clobetasol (TEMOVATE) 0 05 % ointment, APPLY TWICE A DAY FOR 2 WEEKS, THEN AS NEEDED FLARES, Disp: , Rfl: 1    Multiple Vitamins-Minerals (CENTRUM SILVER 50+MEN PO), Take 1 tablet by mouth daily  , Disp: , Rfl:     quinapril (ACCUPRIL) 20 mg tablet, TAKE 1 TABLET DAILY AS NEEDED, Disp: 90 tablet, Rfl: 3    tamsulosin (FLOMAX) 0 4 mg, Take 1 capsule (0 4 mg total) by mouth daily with dinner, Disp: 90 capsule, Rfl: 1    glucosamine-chondroitin (COSAMIN DS) 500-400 MG tablet, Take 1 tablet by mouth 3 (three) times a day, Disp: , Rfl:     Health Maintenance     Health Maintenance   Topic Date Due    BMI: Followup Plan  01/16/1962    Pneumococcal Vaccine: 65+ Years (2 of 2 - PPSV23) 07/09/2016    INFLUENZA VACCINE  07/01/2019    Fall Risk  01/21/2020    Depression Screening PHQ  01/21/2020    Medicare Annual Wellness Visit (AWV)  01/21/2020    BMI: Adult  07/24/2020    DTaP,Tdap,and Td Vaccines (2 - Td) 09/01/2020    CRC Screening: Colonoscopy  12/29/2027    Pneumococcal Vaccine: Pediatrics (0 to 5 Years) and At-Risk Patients (6 to 59 Years)  Aged Out    HEPATITIS B VACCINES  Aged Out     Immunization History   Administered Date(s) Administered    INFLUENZA 09/22/2015, 09/19/2016, 09/15/2018    Influenza Split High Dose Preservative Free IM 10/10/2014, 09/22/2015, 09/19/2016, 08/31/2017    Influenza TIV (IM) 09/14/2012    Pneumococcal Conjugate 13-Valent 07/09/2015    Pneumococcal Polysaccharide PPV23 11/01/2006    Tdap 09/01/2010    Zoster 07/18/2008    Zoster Vaccine Recombinant 04/15/2019, 06/17/2019       Venus Collier DO  [facility]

## 2019-09-18 DIAGNOSIS — S16.1XXD STRAIN OF NECK MUSCLE, SUBSEQUENT ENCOUNTER: Primary | ICD-10-CM

## 2019-09-20 ENCOUNTER — EVALUATION (OUTPATIENT)
Dept: PHYSICAL THERAPY | Facility: CLINIC | Age: 74
End: 2019-09-20
Payer: MEDICARE

## 2019-09-20 DIAGNOSIS — S16.1XXD STRAIN OF NECK MUSCLE, SUBSEQUENT ENCOUNTER: Primary | ICD-10-CM

## 2019-09-20 PROCEDURE — 97110 THERAPEUTIC EXERCISES: CPT | Performed by: PHYSICAL THERAPIST

## 2019-09-20 PROCEDURE — 97162 PT EVAL MOD COMPLEX 30 MIN: CPT | Performed by: PHYSICAL THERAPIST

## 2019-09-20 NOTE — PROGRESS NOTES
PT Evaluation     Today's date: 2019  Patient name: Katerine Ordaz  :   MRN: [id number]  Referring provider: Anuel Saab DO  Dx:   Encounter Diagnosis     ICD-10-CM    1  Strain of neck muscle, subsequent encounter S16  1XXD Ambulatory referral to Physical Therapy                  Assessment  Assessment details: Patient presents with pain during cervical extension, right cervical SB and rotation  This has caused him pain over the last 5 weeks and has limited him doing activities looking to the right as well as sleeping  Special tests are indicative of an upper trap strain and some neural tension coming from C5-C6 segments  Patient fits the CPR well for cervical traction and would benefit from continued skilled course of physical therapy in order to decrease symptoms, address functional limitations and progress toward his functional goals  Impairments: abnormal or restricted ROM and activity intolerance  Other impairment: Sleep    Symptom irritability: moderateUnderstanding of Dx/Px/POC: excellent  Goals  STG  1  Patient will be independent with HEP in 2 visits  2  Patient will be able to lay with one pillow with no greater than 1/10 pain in 2-4 weeks  3  Patient will sleep 50% of the nights without waking in 3-4 weeks  LTG  1  Patient will decrease to 0/10 pain while sleeping in 6-8 weeks  2  Patient will increase R cervical rotation ROM to 80-90 degrees in 6-8 weeks  3 Patient will increase FOTO score greater than or equal to expected values in 8 weeks      Plan  Patient would benefit from: skilled physical therapy  Planned modality interventions: traction, TENS and biofeedback  Planned therapy interventions: joint mobilization, manual therapy, activity modification, ADL training, patient education, postural training, self care, strengthening, stretching, therapeutic activities, therapeutic exercise, home exercise program, functional ROM exercises and flexibility  Frequency: 2x week  Plan of Care beginning date: 2019  Plan of Care expiration date: 11/15/2019  Treatment plan discussed with: patient        Subjective Evaluation    History of Present Illness  Date of onset: 2019  Mechanism of injury: Patient is a 75year old male who presents to physical therapy with a 5 week history of posterior neck pain  Patient has difficulty rotating to the right and with sleeping at night  The pain is sharp and "feels like a hook at the dentist" all the time  In addition, patient does note radiating symptoms on and off into his left UE distal to the elbow and right pain to the shoulder  Patient had an Xray 5 years ago that showed some DDD  Patient has a history of chronic arthritis in hips and now recently in the hand  Patient states that his goals for PT are to reduce his pain and improve quality of sleeping for improved QOL  Recurrent probem    Pain  Current pain ratin  At best pain rating: 3  Quality: sharp, radiating and knife-like  Relieving factors: rest, relaxation and medications          Objective     Concurrent Complaints  Positive for disturbed sleep   Negative for night pain    Postural Observations  Seated posture: fair  Standing posture: fair        Neurological Testing     Sensation   Cervical/Thoracic   Left   Intact: light touch    Right   Intact: light touch    Reflexes   Left   Biceps (C5/C6): absent (0)  Brachioradialis (C6): trace (1+)  Vasquez's reflex: negative    Right   Biceps (C5/C6): absent (0)  Brachioradialis (C6): normal (2+)  Vasquez's reflex: negative    Active Range of Motion   Cervical/Thoracic Spine       Cervical    Left lateral flexion: 22 degrees      Right lateral flexion: 30 degrees      Left rotation: 58 degrees  Right rotation: 52 degrees             Joint Play     Hypermobile: C5 and C6     Hypomobile: C3, C4, C7 and T1     Pain: C4, C5 and C6     Strength/Myotome Testing   Cervical Spine     Left   Levator scapulae (C4): 5    Right   Levator scapulae (C4): 5    Left Shoulder     Planes of Motion   Abduction: 5     Isolated Muscles   Levator scapulae: 5     Right Shoulder     Planes of Motion   Abduction: 5     Isolated Muscles   Levator scapulae: 5     Left Elbow   Extension: 5    Right Elbow   Extension: 5    Tests   Pain with max closing    Cervical   Positive lumbar distraction test     Right   Positive Spurling's Test A       Additional Tests Details  Positive upper trap release with rotation      Flowsheet Rows      Most Recent Value   PT/OT G-Codes   Current Score  60   Projected Score  69             Precautions: Benign essential hypertension, abnormal blood sugar, HHC, arthritis, benign prostatic hyperplasia with urinary hesitancy      Manual  9/20            Distraction             Right sided opening glides, C5-C6             UT release                                           Exercise Diary  9/20            UT stretch HEP            Cervical retraction supine  HEP            No money with cervical retraction NV            Cervical AROM NV            Cervical retraction with theraband rows  NV                                                                                                                                                                                                                   Modalities  9/20            Cervical Traction

## 2019-09-21 DIAGNOSIS — N40.1 BENIGN PROSTATIC HYPERPLASIA WITH URINARY HESITANCY: ICD-10-CM

## 2019-09-21 DIAGNOSIS — R39.11 BENIGN PROSTATIC HYPERPLASIA WITH URINARY HESITANCY: ICD-10-CM

## 2019-09-21 RX ORDER — TAMSULOSIN HYDROCHLORIDE 0.4 MG/1
0.4 CAPSULE ORAL
Qty: 90 CAPSULE | Refills: 1 | Status: SHIPPED | OUTPATIENT
Start: 2019-09-21 | End: 2020-01-29 | Stop reason: SDUPTHER

## 2019-09-23 DIAGNOSIS — I10 ESSENTIAL HYPERTENSION: ICD-10-CM

## 2019-09-23 RX ORDER — QUINAPRIL 20 MG/1
TABLET ORAL
Qty: 90 TABLET | Refills: 3 | Status: SHIPPED | OUTPATIENT
Start: 2019-09-23 | End: 2020-09-10

## 2019-09-27 ENCOUNTER — OFFICE VISIT (OUTPATIENT)
Dept: PHYSICAL THERAPY | Facility: CLINIC | Age: 74
End: 2019-09-27
Payer: MEDICARE

## 2019-09-27 DIAGNOSIS — S16.1XXD STRAIN OF NECK MUSCLE, SUBSEQUENT ENCOUNTER: Primary | ICD-10-CM

## 2019-09-27 PROCEDURE — 97012 MECHANICAL TRACTION THERAPY: CPT | Performed by: PHYSICAL THERAPIST

## 2019-09-27 PROCEDURE — 97140 MANUAL THERAPY 1/> REGIONS: CPT | Performed by: PHYSICAL THERAPIST

## 2019-09-27 PROCEDURE — 97110 THERAPEUTIC EXERCISES: CPT | Performed by: PHYSICAL THERAPIST

## 2019-09-27 NOTE — PROGRESS NOTES
Daily Note     Today's date: 2019  Patient name: aYri Dawkins  : [birth year]  MRN: [id number]  Referring provider: Lashaun Mcdonald DO  Dx:   Encounter Diagnosis     ICD-10-CM    1  Strain of neck muscle, subsequent encounter S16  1XXD                   Subjective: Patient reports compliance with his HEP and notes some reduction in symptoms since IE  Objective: See treatment diary below      Assessment: Patient demonstrating improved DNF activation and compliance with his HEP  Good tolerance noted with manual and mechanical traction  g    Plan: Continue per plan of care        Precautions: Benign essential hypertension, abnormal blood sugar, HHC, arthritis, benign prostatic hyperplasia with urinary hesitancy      Manual             Cervical istraction  Grade IV           Right sided opening glides, C5-C6  Grade IV           UT release  3x30"           R UT STM  3'                            Exercise Diary             UT stretch HEP 4x30"           Cervical distraction w/ retraction supine  HEP 2x10           No money with cervical retraction NV GTB  2x10 B/L           Cervical AROM NV            Cervical retraction with theraband rows  NV BTB  3x10                                                                                                                                                                                                                  Modalities             Cervical Traction  16#  15'

## 2019-09-30 ENCOUNTER — OFFICE VISIT (OUTPATIENT)
Dept: PHYSICAL THERAPY | Facility: CLINIC | Age: 74
End: 2019-09-30
Payer: MEDICARE

## 2019-09-30 DIAGNOSIS — S16.1XXD STRAIN OF NECK MUSCLE, SUBSEQUENT ENCOUNTER: Primary | ICD-10-CM

## 2019-09-30 PROCEDURE — 97110 THERAPEUTIC EXERCISES: CPT | Performed by: PHYSICAL THERAPIST

## 2019-09-30 PROCEDURE — 97140 MANUAL THERAPY 1/> REGIONS: CPT | Performed by: PHYSICAL THERAPIST

## 2019-09-30 PROCEDURE — 97012 MECHANICAL TRACTION THERAPY: CPT | Performed by: PHYSICAL THERAPIST

## 2019-09-30 NOTE — PROGRESS NOTES
Daily Note     Today's date: 2019  Patient name: Fatimah Osman  : [id number]  MRN: [id number]  Referring provider: Lori Lake DO  Dx:   Encounter Diagnosis     ICD-10-CM    1  Strain of neck muscle, subsequent encounter S16  1XXD                   Subjective: Patient reports compliance with his HEP and notes some reduction in overall symptoms thus far  Objective: See treatment diary below      Assessment: Patient demonstrating continued right UT tension but improvement in length noted  Patient continues to respond well to cervical traction without centralization of radicular symptoms  Plan: Continue per plan of care        Precautions: Benign essential hypertension, abnormal blood sugar, HHC, arthritis, benign prostatic hyperplasia with urinary hesitancy      Manual            Cervical distraction  Grade IV Grade IV          Right sided opening glides, C5-C6  Grade IV Grade IV          UT release  3x30" 3x30"          R UT STM  3' 3'                           Exercise Diary            UT stretch HEP 4x30" 4x30"          Cervical distraction w/ retraction supine  HEP 2x10 2x10          No money with cervical retraction NV GTB  2x10 B/L GTB  2x10 B/L          Cervical AROM NV            Cervical retraction with theraband rows  NV BTB  3x10 BTB  3x10                                                                                                                                                                                                                 Modalities            Cervical Traction  16#  15' 18#  15'

## 2019-10-03 ENCOUNTER — OFFICE VISIT (OUTPATIENT)
Dept: PHYSICAL THERAPY | Facility: CLINIC | Age: 74
End: 2019-10-03
Payer: MEDICARE

## 2019-10-03 DIAGNOSIS — S16.1XXD STRAIN OF NECK MUSCLE, SUBSEQUENT ENCOUNTER: Primary | ICD-10-CM

## 2019-10-03 PROCEDURE — 97012 MECHANICAL TRACTION THERAPY: CPT | Performed by: PHYSICAL THERAPIST

## 2019-10-03 PROCEDURE — 97140 MANUAL THERAPY 1/> REGIONS: CPT | Performed by: PHYSICAL THERAPIST

## 2019-10-03 PROCEDURE — 97110 THERAPEUTIC EXERCISES: CPT | Performed by: PHYSICAL THERAPIST

## 2019-10-03 PROCEDURE — 97112 NEUROMUSCULAR REEDUCATION: CPT | Performed by: PHYSICAL THERAPIST

## 2019-10-03 NOTE — PROGRESS NOTES
Daily Note     Today's date: 10/3/2019  Patient name: Aries Montes  : [birth year]  MRN: [id number]  Referring provider: Marc Meek DO  Dx:   Encounter Diagnosis     ICD-10-CM    1  Strain of neck muscle, subsequent encounter S16  1XXD                   Subjective: Patient states that his neck symptoms are doing much better and notes improvement in ADL function  Objective: See treatment diary below      Assessment: Patient demonstrating improved tolerance for cervical retractions and UT length improving  Progressed TE noted below and patient was able to perform without compensation  Positive response noted following cervical traction  Plan: Continue per plan of care        Precautions: Benign essential hypertension, abnormal blood sugar, HHC, arthritis, benign prostatic hyperplasia with urinary hesitancy      Manual   10/3         Cervical distraction  Grade IV Grade IV Grade IV         Right sided opening glides, C5-C6  Grade IV Grade IV Grade IV         UT release  3x30" 3x30" 3x30"         R UT STM  3' 3' 3'                          Exercise Diary   10/3         UT stretch HEP 4x30" 4x30" 4x30"         Cervical distraction w/ retraction supine  HEP 2x10 2x10 2x10         No money with cervical retraction NV GTB  2x10 B/L GTB  2x10 B/L GTB  2x10 B/L         Cervical AROM NV            Cervical retraction with theraband rows  NV BTB  3x10 BTB  3x10 BTB 3x10         Prone T's    2x10         UBE w/ postural correction    L2  3'/3'                                                                                                                                                                                      Modalities            Cervical Traction  16#  15' 18#  15'

## 2019-10-07 ENCOUNTER — APPOINTMENT (OUTPATIENT)
Dept: PHYSICAL THERAPY | Facility: CLINIC | Age: 74
End: 2019-10-07
Payer: MEDICARE

## 2019-10-10 ENCOUNTER — OFFICE VISIT (OUTPATIENT)
Dept: PHYSICAL THERAPY | Facility: CLINIC | Age: 74
End: 2019-10-10
Payer: MEDICARE

## 2019-10-10 DIAGNOSIS — S16.1XXD STRAIN OF NECK MUSCLE, SUBSEQUENT ENCOUNTER: Primary | ICD-10-CM

## 2019-10-10 PROCEDURE — 97110 THERAPEUTIC EXERCISES: CPT | Performed by: PHYSICAL THERAPIST

## 2019-10-10 PROCEDURE — 97140 MANUAL THERAPY 1/> REGIONS: CPT | Performed by: PHYSICAL THERAPIST

## 2019-10-10 PROCEDURE — 97012 MECHANICAL TRACTION THERAPY: CPT | Performed by: PHYSICAL THERAPIST

## 2019-10-10 NOTE — PROGRESS NOTES
PT Re-Evaluation  and PT Discharge    Today's date: 10/10/2019  Patient name: Hari Edmond  :   MRN: [id number]  Referring provider: Wilfredo Aleman DO  Dx:   Encounter Diagnosis     ICD-10-CM    1  Strain of neck muscle, subsequent encounter S16  1XXD                   Assessment  Assessment details: Patient is a 75y o  year old male who attended physical therapy for 5 treatment sessions regarding cervical pain  Patient reports 100% improvement at this time which correlates with FOTO scoring  Patient has shown improvement throughout PT by demonstrating decreased pain, increased range of motion, increased strength and improved tolerance to activity  Secondary to achieving functional goals and independence with comprehensive Home Exercise Program, Chaim Flores will be discharged from PT at this time  Thank you  Impairments: abnormal or restricted ROM and activity intolerance  Other impairment: Sleep    Symptom irritability: moderateUnderstanding of Dx/Px/POC: excellent  Goals  STG  1  Patient will be independent with HEP in 2 visits = MET  2  Patient will be able to lay with one pillow with no greater than 1/10 pain in 2-4 weeks = MET  3  Patient will sleep 50% of the nights without waking in 3-4 weeks = MET    LTG  1  Patient will decrease to 0/10 pain while sleeping in 6-8 weeks = MET  2  Patient will increase R cervical rotation ROM to 80-90 degrees in 6-8 weeks = MET  3  Patient will increase FOTO score greater than or equal to expected values in 8 weeks = MET    Plan  Plan details: Patient will be d/c from PT  Patient would benefit from: skilled physical therapy  Planned modality interventions: traction, TENS and biofeedback  Planned therapy interventions: joint mobilization, manual therapy, activity modification, ADL training, patient education, postural training, self care, strengthening, stretching, therapeutic activities, therapeutic exercise, home exercise program, functional ROM exercises and flexibility  Treatment plan discussed with: patient        Subjective Evaluation    History of Present Illness  Date of onset: 2019  Mechanism of injury: Patient is a 75year old male who presents to physical therapy with a 5 week history of posterior neck pain  Patient has difficulty rotating to the right and with sleeping at night  The pain is sharp and "feels like a hook at the dentist" all the time  In addition, patient does note radiating symptoms on and off into his left UE distal to the elbow and right pain to the shoulder  Patient had an Xray 5 years ago that showed some DDD  Patient has a history of chronic arthritis in hips and now recently in the hand  Patient states that his goals for PT are to reduce his pain and improve quality of sleeping for improved QOL  10/10/2019: Patient reports compliance with his HEP and notes significant reduction in pain/symptoms to date  Patient notes only moderate left cervical pain with left cervical rotation              Recurrent probem    Pain  Current pain ratin  At best pain rating: 3  Quality: sharp, radiating and knife-like  Relieving factors: rest, relaxation and medications          Objective     Concurrent Complaints  Negative for night pain and disturbed sleep    Postural Observations  Seated posture: fair  Standing posture: fair        Neurological Testing     Sensation   Cervical/Thoracic   Left   Intact: light touch    Right   Intact: light touch    Reflexes   Left   Biceps (C5/C6): absent (0)  Brachioradialis (C6): trace (1+)  Vasquez's reflex: negative    Right   Biceps (C5/C6): absent (0)  Brachioradialis (C6): normal (2+)  Vasquez's reflex: negative    Active Range of Motion   Cervical/Thoracic Spine       Cervical    Left lateral flexion: 32 degrees      Right lateral flexion: 34 degrees      Left rotation: 66 degrees  Right rotation: 66 degrees             Joint Play   Joints within functional limits: C4     Hypermobile: C5 and C6 Hypomobile: C3, C4, C7 and T1     Strength/Myotome Testing   Cervical Spine     Left   Levator scapulae (C4): 5    Right   Levator scapulae (C4): 5    Left Shoulder     Planes of Motion   Abduction: 5     Isolated Muscles   Levator scapulae: 5     Right Shoulder     Planes of Motion   Abduction: 5     Isolated Muscles   Levator scapulae: 5     Left Elbow   Extension: 5    Right Elbow   Extension: 5    Tests   Pain with max closing    Cervical   Positive lumbar distraction test     Right   Negative Spurling's Test A       Additional Tests Details  Positive upper trap release with rotation       Precautions: Benign essential hypertension, abnormal blood sugar, HHC, arthritis, benign prostatic hyperplasia with urinary hesitancy      Manual  9/20 9/27 9/30 10/3 10/10        Cervical distraction  Grade IV Grade IV Grade IV Grade IV        Right sided opening glides, C5-C6  Grade IV Grade IV Grade IV Grade IV        UT release  3x30" 3x30" 3x30" 3x30"        R UT STM  3' 3' 3'         Re-assessment     10'            Exercise Diary  9/20 9/27 9/30 10/3 10/10        UT stretch HEP 4x30" 4x30" 4x30" 4x30"        Cervical distraction w/ retraction supine  HEP 2x10 2x10 2x10 2x10        No money with cervical retraction NV GTB  2x10 B/L GTB  2x10 B/L GTB  2x10 B/L GTB  3x10 B/L        Cervical AROM NV            Cervical retraction with theraband rows  NV BTB  3x10 BTB  3x10 BTB 3x10 BTB  3x10        Prone T's    2x10         UBE w/ postural correction    L2  3'/3' L2  3'/3'                                                                                                                                                                                     Modalities  9/20 9/27 9/30 10/10         Cervical Traction  16#  15' 18#  15' 18#  15'

## 2019-12-05 ENCOUNTER — TELEPHONE (OUTPATIENT)
Dept: FAMILY MEDICINE CLINIC | Facility: CLINIC | Age: 74
End: 2019-12-05

## 2019-12-14 DIAGNOSIS — R06.02 SHORTNESS OF BREATH: ICD-10-CM

## 2019-12-14 NOTE — TELEPHONE ENCOUNTER
Call patient  We had a request from [facility] for an Advair inhaler  I had prescribed this in May when he was having some shortness of breath and wheezing  Did he request the inhaler?

## 2019-12-17 ENCOUNTER — OFFICE VISIT (OUTPATIENT)
Dept: FAMILY MEDICINE CLINIC | Facility: CLINIC | Age: 74
End: 2019-12-17
Payer: MEDICARE

## 2019-12-17 VITALS
HEART RATE: 87 BPM | HEIGHT: 71 IN | BODY MASS INDEX: 31.5 KG/M2 | WEIGHT: 225 LBS | OXYGEN SATURATION: 98 % | SYSTOLIC BLOOD PRESSURE: 110 MMHG | DIASTOLIC BLOOD PRESSURE: 68 MMHG | TEMPERATURE: 97 F

## 2019-12-17 DIAGNOSIS — M77.11 LATERAL EPICONDYLITIS OF RIGHT ELBOW: Primary | ICD-10-CM

## 2019-12-17 PROCEDURE — 99213 OFFICE O/P EST LOW 20 MIN: CPT | Performed by: FAMILY MEDICINE

## 2019-12-17 RX ORDER — ASPIRIN 81 MG/1
81 TABLET, CHEWABLE ORAL DAILY
COMMUNITY

## 2019-12-17 RX ORDER — IVERMECTIN 10 MG/G
CREAM TOPICAL DAILY PRN
COMMUNITY

## 2019-12-17 NOTE — TELEPHONE ENCOUNTER
Pt states he does not use this medication, this was an auto refill from the pharmacy  Please refuse

## 2019-12-17 NOTE — ASSESSMENT & PLAN NOTE
Pain right elbow x a few weeks  No trauma  Will refer to PT  Poss need for imaging  Call further probs

## 2019-12-17 NOTE — PROGRESS NOTES
[facility]      NAME: Tanya Vaca  AGE: 75 y o  SEX: male  : 1945   MRN: [id number]    DATE: 2019  TIME: 2:23 PM    Assessment and Plan     Problem List Items Addressed This Visit     Lateral epicondylitis of right elbow - Primary     Pain right elbow x a few weeks  No trauma  Will refer to PT  Poss need for imaging  Call further probs  Relevant Orders    Ambulatory referral to Physical Therapy              Return to office in: call further probs  Chief Complaint     Chief Complaint   Patient presents with    Elbow Pain     Right elbow pain, has been hurting for approx 1 month, but last 2 days very painful  History of Present Illness     Pain right elbow x a few weeks  No trauma  The following portions of the patient's history were reviewed and updated as appropriate: allergies, current medications, past family history, past medical history, past social history, past surgical history and problem list     Review of Systems   Review of Systems   Respiratory: Negative  Cardiovascular: Negative  Gastrointestinal: Negative  Genitourinary: Negative  Musculoskeletal: Positive for myalgias         Active Problem List     Patient Active Problem List   Diagnosis    Benign essential hypertension    Abnormal blood sugar    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(HCC)    Arthritis    Benign prostatic hyperplasia with urinary hesitancy    Right lower quadrant pain    Cervical strain    Lateral epicondylitis of right elbow       Objective   /68 (BP Location: Left arm, Patient Position: Sitting, Cuff Size: Adult)   Pulse 87   Temp (!) 97 °F (36 1 °C)   Ht 5' 10 5" (1 791 m)   Wt 102 kg (225 lb)   SpO2 98%   BMI 31 83 kg/m²     Physical Exam    Pertinent Laboratory/Diagnostic Studies:  none    Current Medications     Current Outpatient Medications:     aspirin 81 mg chewable tablet, Chew 81 mg daily, Disp: , Rfl:     cholecalciferol (VITAMIN D3) 1,000 units tablet, Take 1 tablet by mouth daily, Disp: , Rfl:     clobetasol (TEMOVATE) 0 05 % ointment, APPLY TWICE A DAY FOR 2 WEEKS, THEN AS NEEDED FLARES, Disp: , Rfl: 1    Ivermectin (SOOLANTRA) 1 % CREA, Apply topically daily as needed, Disp: , Rfl:     Multiple Vitamins-Minerals (CENTRUM SILVER 50+MEN PO), Take 1 tablet by mouth daily  , Disp: , Rfl:     [START ON 1/1/2020] patient supplied medication, Apply topically 3 (three) times a week Carmustine 0 04%/ 100gm, Disp: , Rfl:     quinapril (ACCUPRIL) 20 mg tablet, TAKE 1 TABLET DAILY AS NEEDED, Disp: 90 tablet, Rfl: 3    tamsulosin (FLOMAX) 0 4 mg, TAKE 1 CAPSULE (0 4 MG TOTAL) BY MOUTH DAILY WITH DINNER, Disp: 90 capsule, Rfl: 1    Azelastine HCl 0 15 % SOLN, USE 1 SPRAY EACH NOSTRIL IN THE MORNING AND IN THE EVENING, Disp: , Rfl: 6    glucosamine-chondroitin (COSAMIN DS) 500-400 MG tablet, Take 1 tablet by mouth 3 (three) times a day, Disp: , Rfl:     Health Maintenance     Health Maintenance   Topic Date Due    BMI: Followup Plan  01/16/1962    Pneumococcal Vaccine: 65+ Years (2 of 2 - PPSV23) 07/09/2016    Medicare Annual Wellness Visit (AWV)  01/21/2020    DTaP,Tdap,and Td Vaccines (2 - Td) 09/01/2020    BMI: Adult  09/11/2020    Fall Risk  09/20/2020    Depression Screening PHQ  09/20/2020    CRC Screening: Colonoscopy  12/29/2027    Influenza Vaccine  Completed    Pneumococcal Vaccine: Pediatrics (0 to 5 Years) and At-Risk Patients (6 to 59 Years)  Aged Out    HIB Vaccine  Aged Out    Hepatitis B Vaccine  Aged Out    IPV Vaccine  Aged Out    Hepatitis A Vaccine  Aged Out    Meningococcal ACWY Vaccine  Aged Out    HPV Vaccine  Aged [employer] History   Administered Date(s) Administered    INFLUENZA 09/22/2015, 09/19/2016, 09/15/2018    Influenza Split High Dose Preservative Free IM 10/10/2014, 09/22/2015, 09/19/2016, 08/31/2017    Influenza TIV (IM) 09/14/2012    Influenza, high dose seasonal 0 5 mL 09/26/2019    Pneumococcal Conjugate 13-Valent 07/09/2015    Pneumococcal Polysaccharide PPV23 11/01/2006    Tdap 09/01/2010    Zoster 07/18/2008    Zoster Vaccine Recombinant 04/15/2019, 06/17/2019       Yary Bhakta DO  [facility]

## 2019-12-18 ENCOUNTER — EVALUATION (OUTPATIENT)
Dept: PHYSICAL THERAPY | Facility: CLINIC | Age: 74
End: 2019-12-18
Payer: MEDICARE

## 2019-12-18 DIAGNOSIS — M77.11 LATERAL EPICONDYLITIS OF RIGHT ELBOW: Primary | ICD-10-CM

## 2019-12-18 PROCEDURE — 97110 THERAPEUTIC EXERCISES: CPT | Performed by: PHYSICAL THERAPIST

## 2019-12-18 PROCEDURE — 97162 PT EVAL MOD COMPLEX 30 MIN: CPT | Performed by: PHYSICAL THERAPIST

## 2019-12-18 NOTE — PROGRESS NOTES
PT Evaluation     Today's date: 2019  Patient name: Tanya Vaca  :   MRN: [id number]  Referring provider: Gavino Davila DO  Dx:   Encounter Diagnosis     ICD-10-CM    1  Lateral epicondylitis of right elbow M77 11                   Assessment  Assessment details: Tanya Vaca is a 75 y o  male presenting to physical therapy with right lateral elbow pain, decreased range of motion, decreased strength, decreased activity tolerance and poor posture  Assessment demonstrates ECRB and supinator tightness along with radial nerve tension  Secondary to these impairments, patient has increased difficulty performing ADL's, household chores and  work related tasks  Aileen Mcgovern would benefit from skilled PT to address these issues and maximize function  Thank you for the referral     Impairments: abnormal or restricted ROM, abnormal movement, activity intolerance, impaired physical strength, pain with function and scapular dyskinesis  Understanding of Dx/Px/POC: excellent  Goals  STG (4 weeks)  1  Patient will be independent with HEP  2  Decrease pain at worst by 2 points on NPRS  3  Increase right radial nerve length by 10 degrees  4  Patient will demonstrate ability to brush his teeth without pain or symptoms  LTG (8 weeks)  1  Decrease pain at worst from 4 points on NPRS  2  Increase right radial nerve length by 20 degrees  3  Patient will demonstrate ability to perform all ADL's without pain or symptoms  4   Increase FOTO > or equal to expected    Plan  Patient would benefit from: skilled PT  Planned therapy interventions: joint mobilization, manual therapy, neuromuscular re-education, patient education, postural training, strengthening, stretching, therapeutic exercise, home exercise program and ADL training  Frequency: 2x week  Duration in weeks: 8  Treatment plan discussed with: patient        Subjective Evaluation    History of Present Illness  Mechanism of injury: Patient reports progressive right lateral elbow pain over the past 1 month  Patient denies any activity causing his symptoms but does have a history of cervical pain a few months back that was radicular in nature on the C5/C6 distrubution  X-rays were taken 3 months ago which also demonstrated this  Patient describes his symptoms as sharp and is worse with elbow flexion/extension  During afterthought patient does states that his symptoms may be the result of driving to and from [place] to treat his t-cell lymphoma  Secondary to his symptoms patient has difficulty brushing his teeth, driving, and with general ADL's  Patients goals for PT are to decrease his pain and improve ADL function  Recurrent probem    Quality of life: good    Pain  Current pain ratin  At best pain ratin  At worst pain ratin  Quality: sharp and radiating  Relieving factors: rest and relaxation  Aggravating factors: lifting  Progression: worsening    Social Support    Employment status: not working  Hand dominance: right      Diagnostic Tests  X-ray: abnormal  Treatments  No previous or current treatments  Patient Goals  Patient goals for therapy: increased strength, independence with ADLs/IADLs, return to sport/leisure activities, increased motion and decreased pain          Objective     Palpation     Right   Tenderness of the supinator  Tenderness     Right Elbow   Tenderness in the lateral epicondyle  Right Wrist/Hand   Tenderness in the lateral epicondyle       Neurological Testing     Sensation     Elbow   Left Elbow   Inact: light touch    Right Elbow   Intact: light touch    Reflexes   Left   Biceps (C5/C6): normal (2+)  Brachioradialis (C6): normal (2+)  Vasquez's reflex: negative    Right   Biceps (C5/C6): normal (2+)  Brachioradialis (C6): normal (2+)  Vasquez's reflex: negative    Additional Neurological Details  (-) Spurling  (-) Cervical Compression    Active Range of Motion     Right Elbow   Flexion: WFL  Extension: WFL and with pain  Forearm supination: WFL and with pain  Forearm pronation: WFL    Passive Range of Motion     Right Elbow   Flexion: WFL  Extension: WFL and with pain  Forearm supination: WFL and with pain  Forearm pronation: [facility]    Joint Play     Right Elbow   Hypomobile in the humeroradial joint  Strength/Myotome Testing     Right Elbow   Flexion: 5  Extension: 5  Forearm supination: 4-  Forearm pronation: 4    Tests     Right Elbow   Positive handshake, radial tunnel and Tinel's sign (cubital tunnel)  Negative valgus stress at 0 degrees, valgus stress at 30 degrees, varus stress at 0 degrees and varus stress at 30 degrees                Precautions: Benign essential hypertension, abnormal blood sugar, HHC, arthritis, benign prostatic hyperplasia with urinary hesitancy, T-cell lymphoma      Manual  12/18            R ECRB release 5'            R Radial nerve glides             R radiohumeral distraction             R radiohumeral MWM glide             Supinator release                 Exercise Diary  12/18            UBE w/ postural correction             Wrist extensor stretch 4x30"            Ice massage + release HEP            Wrist extensor eccentrics             Radial nerve glides                                                                                                                                                                                                                    Modalities

## 2019-12-26 ENCOUNTER — OFFICE VISIT (OUTPATIENT)
Dept: PHYSICAL THERAPY | Facility: CLINIC | Age: 74
End: 2019-12-26
Payer: MEDICARE

## 2019-12-26 ENCOUNTER — TELEPHONE (OUTPATIENT)
Dept: FAMILY MEDICINE CLINIC | Facility: CLINIC | Age: 74
End: 2019-12-26

## 2019-12-26 DIAGNOSIS — M77.11 LATERAL EPICONDYLITIS OF RIGHT ELBOW: Primary | ICD-10-CM

## 2019-12-26 PROCEDURE — 97110 THERAPEUTIC EXERCISES: CPT | Performed by: PHYSICAL THERAPIST

## 2019-12-26 PROCEDURE — 97140 MANUAL THERAPY 1/> REGIONS: CPT | Performed by: PHYSICAL THERAPIST

## 2019-12-26 NOTE — TELEPHONE ENCOUNTER
[facility] came thru [facility]     Advair 250 50 diskus  Inhale 1 puff 2 times a day   Last refill 5/15/19 med list says pt not taking

## 2019-12-26 NOTE — PROGRESS NOTES
Daily Note     Today's date: 2019  Patient name: Jesusita Crowder  : [id number]  MRN: [id number]  Referring provider: Renata Kasper DO  Dx:   Encounter Diagnosis     ICD-10-CM    1  Lateral epicondylitis of right elbow M77 11                   Subjective: Patient reports compliance with his HEP and states that his pain has decreased some with gripping but states that it was painful handling al the gifts  Objective: See treatment diary below      Assessment: ECRB and supinator tightness remain but decreased since IE  Moderate radial nerve tension persists and patient was educated in an HEP  Good tolerance noted for all other TE  Plan: Continue per plan of care  Precautions: Benign essential hypertension, abnormal blood sugar, HHC, arthritis, benign prostatic hyperplasia with urinary hesitancy, T-cell lymphoma      Manual             R ECRB release 5' 5'           R Radial nerve glides  2x10           R radiohumeral distraction  Grade IV           R radiohumeral MWM glide  Grade IV           Supinator release  5'               Exercise Diary             UBE w/ postural correction  L2  3'/3'           Wrist extensor stretch 4x30" 4x30"           Ice massage + release HEP HEP           Wrist extensor eccentrics  NV           Radial nerve glides  2x10                                                                                                                                                                                                                  Modalities                                                       - Patient requested early leave secondary to illness

## 2019-12-27 NOTE — TELEPHONE ENCOUNTER
Contacted patient, states he is no longer taking Advair Diskus and he will contact the pharmacy to ask them to discontinue it  Informed patient to give us a call if the pharmacy requires confirmation from the doctor in order to discontinue medication

## 2019-12-31 ENCOUNTER — OFFICE VISIT (OUTPATIENT)
Dept: PHYSICAL THERAPY | Facility: CLINIC | Age: 74
End: 2019-12-31
Payer: MEDICARE

## 2019-12-31 DIAGNOSIS — M77.11 LATERAL EPICONDYLITIS OF RIGHT ELBOW: Primary | ICD-10-CM

## 2019-12-31 PROCEDURE — 97110 THERAPEUTIC EXERCISES: CPT | Performed by: PHYSICAL THERAPIST

## 2019-12-31 PROCEDURE — 97140 MANUAL THERAPY 1/> REGIONS: CPT | Performed by: PHYSICAL THERAPIST

## 2019-12-31 NOTE — PROGRESS NOTES
Daily Note     Today's date: 2019  Patient name: Brie Shaw  :   MRN: [id number]  Referring provider: Meli Caldwell DO  Dx:   Encounter Diagnosis     ICD-10-CM    1  Lateral epicondylitis of right elbow M77 11                   Subjective: Patient reports compliance with his HEP and states that he continues with soreness during gripping activities  Objective: See treatment diary below      Assessment: Right ECRB remains tight with muscle length assessment and palpation demonstrates inflammation and tightness  Radial nerve length improving and patient was able to demonstrate proper form with nerve glides  Good tolerance to all manauls and TE  Plan: Continue per plan of care        Precautions: Benign essential hypertension, abnormal blood sugar, HHC, arthritis, benign prostatic hyperplasia with urinary hesitancy, T-cell lymphoma      Manual            R ECRB release 5' 5' 5'          R Radial nerve glides  2x10 2x10          R radiohumeral distraction  Grade IV Grade IV          R radiohumeral MWM glide  Grade IV Grade IV          Supinator release  5' 5'              Exercise Diary            UBE w/ postural correction  L2  3'/3' L2  3'/3'          Wrist extensor stretch 4x30" 4x30" 4x30"          Ice massage + release HEP HEP HEP          Wrist extensor eccentrics  NV Green  2x10          Radial nerve glides  2x10 2x10                                                                                                                                                                                                                 Modalities

## 2020-01-02 ENCOUNTER — OFFICE VISIT (OUTPATIENT)
Dept: PHYSICAL THERAPY | Facility: CLINIC | Age: 75
End: 2020-01-02
Payer: MEDICARE

## 2020-01-02 DIAGNOSIS — M77.11 LATERAL EPICONDYLITIS OF RIGHT ELBOW: Primary | ICD-10-CM

## 2020-01-02 PROCEDURE — 97140 MANUAL THERAPY 1/> REGIONS: CPT | Performed by: PHYSICAL THERAPIST

## 2020-01-02 PROCEDURE — 97110 THERAPEUTIC EXERCISES: CPT | Performed by: PHYSICAL THERAPIST

## 2020-01-02 NOTE — PROGRESS NOTES
Daily Note     Today's date: 2020  Patient name: Renetta Lorenzo  : [id number]  MRN: [id number]  Referring provider: Paty Childers DO  Dx:   Encounter Diagnosis     ICD-10-CM    1  Lateral epicondylitis of right elbow M77 11                   Subjective: Patient reports continued soreness pre-tx and states that he noticed pain/soreness following ECRB eccentrics  Objective: See treatment diary below      Assessment: Patient educated regarding pain reproduction with eccentrics but started TE with this and ended with manuals to decrease tightness  Plan: Continue per plan of care        Precautions: Benign essential hypertension, abnormal blood sugar, HHC, arthritis, benign prostatic hyperplasia with urinary hesitancy, T-cell lymphoma      Manual   12         R ECRB release 5' 5' 5' 5'         R Radial nerve glides  2x10 2x10 2x10         R radiohumeral distraction  Grade IV Grade IV Grade IV         R radiohumeral MWM glide  Grade IV Grade IV Grade IV         Supinator release  5' 5' 5'             Exercise Diary   1/2         UBE w/ postural correction  L2  3'/3' L2  3'/3' L2  3'/3'         Wrist extensor stretch 4x30" 4x30" 4x30" 4x30"         Ice massage + release HEP HEP HEP HEP         Wrist extensor eccentrics  NV Green  2x10 Green  2x10         Radial nerve glides  2x10 2x10 2x10                                                                                                                                                                                                                Modalities

## 2020-01-07 ENCOUNTER — OFFICE VISIT (OUTPATIENT)
Dept: PHYSICAL THERAPY | Facility: CLINIC | Age: 75
End: 2020-01-07
Payer: MEDICARE

## 2020-01-07 DIAGNOSIS — M77.11 LATERAL EPICONDYLITIS OF RIGHT ELBOW: Primary | ICD-10-CM

## 2020-01-07 PROCEDURE — 97140 MANUAL THERAPY 1/> REGIONS: CPT | Performed by: PHYSICAL THERAPIST

## 2020-01-07 PROCEDURE — 97110 THERAPEUTIC EXERCISES: CPT | Performed by: PHYSICAL THERAPIST

## 2020-01-07 NOTE — PROGRESS NOTES
Daily Note     Today's date: 2020  Patient name: Tejas Li  :   MRN: [id number]  Referring provider: Sandro Leon DO  Dx:   Encounter Diagnosis     ICD-10-CM    1  Lateral epicondylitis of right elbow M77 11                   Subjective: Patient reports compliance with his HEP and states that his pain is much less with rotating his arm but mild-moderate pain remains  Objective: See treatment diary below      Assessment: Patient demonstrating improved tolerance for eccentric wrist extensor strengthening and increased sets noted below  Palpation demonstrates decreasing tightness of the ECRB and improved radial nerve length  Plan: Continue per plan of care        Precautions: Benign essential hypertension, abnormal blood sugar, HHC, arthritis, benign prostatic hyperplasia with urinary hesitancy, T-cell lymphoma      Manual          R ECRB release 5' 5' 5' 5' 5'        R Radial nerve glides  2x10 2x10 2x10 2x10        R radiohumeral distraction  Grade IV Grade IV Grade IV Grade IV        R radiohumeral MWM glide  Grade IV Grade IV Grade IV Grade IV        Supinator release  5' 5' 5' 5'            Exercise Diary          UBE w/ postural correction  L2  3'/3' L2  3'/3' L2  3'/3' L2  6'        Wrist extensor stretch 4x30" 4x30" 4x30" 4x30" 4x30"        Ice massage + release HEP HEP HEP HEP HEP        Wrist extensor eccentrics  NV Green  2x10 Green  2x10 Green  3x10        Radial nerve glides  2x10 2x10 2x10 2x10                                                                                                                                                                                                               Modalities

## 2020-01-10 ENCOUNTER — EVALUATION (OUTPATIENT)
Dept: PHYSICAL THERAPY | Facility: CLINIC | Age: 75
End: 2020-01-10
Payer: MEDICARE

## 2020-01-10 DIAGNOSIS — M77.11 LATERAL EPICONDYLITIS OF RIGHT ELBOW: Primary | ICD-10-CM

## 2020-01-10 PROCEDURE — 97140 MANUAL THERAPY 1/> REGIONS: CPT | Performed by: PHYSICAL THERAPIST

## 2020-01-10 PROCEDURE — 97110 THERAPEUTIC EXERCISES: CPT | Performed by: PHYSICAL THERAPIST

## 2020-01-10 NOTE — PROGRESS NOTES
PT Re-Evaluation  and PT Discharge    Today's date: 1/10/2020  Patient name: Conchita Waters  :   MRN: [id number]  Referring provider: Jesus Stovall DO  Dx:   Encounter Diagnosis     ICD-10-CM    1  Lateral epicondylitis of right elbow M77 11                   Assessment  Assessment details: Patient is a 75y o  year old male who attended physical therapy for 6 treatment sessions regarding right lateral elbow pain  Patient reports 75% improvement at this time which correlates with FOTO scoring  Patient has shown improvement throughout PT by demonstrating decreased pain, increased range of motion, increased strength and improved tolerance to activity  Secondary to achieving functional goals and independence with comprehensive Home Exercise Program, Kailash Vergara will be discharged from PT at this time  Thank you  Impairments: abnormal or restricted ROM, abnormal movement, activity intolerance, impaired physical strength, pain with function and scapular dyskinesis  Understanding of Dx/Px/POC: excellent   Prognosis: good    Goals  STG (4 weeks)  1  Patient will be independent with HEP = MET  2  Decrease pain at worst by 2 points on NPRS = MET  3  Increase right radial nerve length by 10 degrees = MET  4  Patient will demonstrate ability to brush his teeth without pain or symptoms = MET  LTG (8 weeks)  1  Decrease pain at worst from 4 points on NPRS = PARTIALLY MET  2  Increase right radial nerve length by 20 degrees = PARTIALLY MET  3  Patient will demonstrate ability to perform all ADL's without pain or symptoms = PARTIALLY MET  4   Increase FOTO > or equal to expected = MET    Plan  Patient would benefit from: skilled PT  Planned therapy interventions: joint mobilization, manual therapy, neuromuscular re-education, patient education, postural training, strengthening, stretching, therapeutic exercise, home exercise program and ADL training  Frequency: 2x week  Duration in weeks: 8  Treatment plan discussed with: patient        Subjective Evaluation    History of Present Illness  Mechanism of injury: Patient reports progressive right lateral elbow pain over the past 1 month  Patient denies any activity causing his symptoms but does have a history of cervical pain a few months back that was radicular in nature on the C5/C6 distrubution  X-rays were taken 3 months ago which also demonstrated this  Patient describes his symptoms as sharp and is worse with elbow flexion/extension  During afterthought patient does states that his symptoms may be the result of driving to and from [place] to treat his t-cell lymphoma  Secondary to his symptoms patient has difficulty brushing his teeth, driving, and with general ADL's  Patients goals for PT are to decrease his pain and improve ADL function  1/10/2019: Patient reports continued reduction in overall discomfort and states that he has been compliant with his HEP  Patient reports > 75% reduction since IE  Pain only reported with lifting that requires wrist extensor activation  Recurrent probem    Quality of life: good    Pain  Current pain ratin  At best pain ratin  At worst pain ratin  Quality: sharp and radiating  Relieving factors: rest and relaxation  Aggravating factors: lifting  Progression: worsening    Social Support    Employment status: not working  Hand dominance: right      Diagnostic Tests  X-ray: abnormal  Treatments  No previous or current treatments  Patient Goals  Patient goals for therapy: increased strength, independence with ADLs/IADLs, return to sport/leisure activities, increased motion and decreased pain          Objective     Tenderness     Right Elbow   Tenderness in the lateral epicondyle  Right Wrist/Hand   Tenderness in the lateral epicondyle       Neurological Testing     Sensation     Elbow   Left Elbow   Inact: light touch    Right Elbow   Intact: light touch    Reflexes   Left   Biceps (C5/C6): normal (2+)  Brachioradialis (C6): normal (2+)  Vasquez's reflex: negative    Right   Biceps (C5/C6): normal (2+)  Brachioradialis (C6): normal (2+)  Vasquez's reflex: negative    Additional Neurological Details  (-) Spurling  (-) Cervical Compression    Active Range of Motion     Right Elbow   Flexion: WFL  Extension: WFL  Forearm supination: WFL  Forearm pronation: WFL    Passive Range of Motion     Right Elbow   Flexion: WFL  Extension: WFL and with pain  Forearm supination: WFL and with pain  Forearm pronation: [facility]    Joint Play     Right Elbow   Joints within functional limits are the humeroradial joint  Strength/Myotome Testing     Right Elbow   Flexion: 5  Extension: 5  Forearm supination: 4  Forearm pronation: 4+    Tests     Right Elbow   Positive radial tunnel and Tinel's sign (cubital tunnel)  Negative handshake, valgus stress at 0 degrees, valgus stress at 30 degrees, varus stress at 0 degrees and varus stress at 30 degrees       Additional Tests Details  Radial nerve length noted @ 30 degrees of shoulder abduction       Precautions: Benign essential hypertension, abnormal blood sugar, HHC, arthritis, benign prostatic hyperplasia with urinary hesitancy, T-cell lymphoma      Manual  12/18 12/26 12/31 1/2 1/7 1/10       R ECRB release 5' 5' 5' 5' 5' 5'       R Radial nerve glides  2x10 2x10 2x10 2x10 2x10       R radiohumeral distraction  Grade IV Grade IV Grade IV Grade IV Grade IV       R radiohumeral MWM glide  Grade IV Grade IV Grade IV Grade IV Grade IV       Supinator release  5' 5' 5' 5' 5'       Re-assessment      10'           Exercise Diary  12/18 12/26 12/31 1/2 1/7 1/10       UBE w/ postural correction  L2  3'/3' L2  3'/3' L2  3'/3' L2  6' L2  6'       Wrist extensor stretch 4x30" 4x30" 4x30" 4x30" 4x30"        Ice massage + release HEP HEP HEP HEP HEP        Wrist extensor eccentrics  NV Green  2x10 Green  2x10 Green  3x10        Radial nerve glides  2x10 2x10 2x10 2x10        HEP review      5' Modalities

## 2020-01-29 ENCOUNTER — OFFICE VISIT (OUTPATIENT)
Dept: FAMILY MEDICINE CLINIC | Facility: CLINIC | Age: 75
End: 2020-01-29
Payer: MEDICARE

## 2020-01-29 VITALS
DIASTOLIC BLOOD PRESSURE: 70 MMHG | HEIGHT: 70 IN | OXYGEN SATURATION: 96 % | SYSTOLIC BLOOD PRESSURE: 110 MMHG | RESPIRATION RATE: 17 BRPM | HEART RATE: 99 BPM | WEIGHT: 224 LBS | BODY MASS INDEX: 32.07 KG/M2 | TEMPERATURE: 98 F

## 2020-01-29 DIAGNOSIS — N40.1 BENIGN PROSTATIC HYPERPLASIA WITH URINARY HESITANCY: ICD-10-CM

## 2020-01-29 DIAGNOSIS — Z12.5 SCREENING FOR PROSTATE CANCER: ICD-10-CM

## 2020-01-29 DIAGNOSIS — Z00.00 ENCOUNTER FOR MEDICARE ANNUAL WELLNESS EXAM: Primary | ICD-10-CM

## 2020-01-29 DIAGNOSIS — Z23 NEED FOR VACCINATION: ICD-10-CM

## 2020-01-29 DIAGNOSIS — C85.80: ICD-10-CM

## 2020-01-29 DIAGNOSIS — I10 BENIGN ESSENTIAL HYPERTENSION: ICD-10-CM

## 2020-01-29 DIAGNOSIS — M19.90 ARTHRITIS: ICD-10-CM

## 2020-01-29 DIAGNOSIS — R39.11 BENIGN PROSTATIC HYPERPLASIA WITH URINARY HESITANCY: ICD-10-CM

## 2020-01-29 DIAGNOSIS — Z13.220 SCREENING FOR CHOLESTEROL LEVEL: ICD-10-CM

## 2020-01-29 DIAGNOSIS — R73.09 ABNORMAL BLOOD SUGAR: ICD-10-CM

## 2020-01-29 PROCEDURE — 90732 PPSV23 VACC 2 YRS+ SUBQ/IM: CPT | Performed by: FAMILY MEDICINE

## 2020-01-29 PROCEDURE — 1123F ACP DISCUSS/DSCN MKR DOCD: CPT | Performed by: FAMILY MEDICINE

## 2020-01-29 PROCEDURE — 99214 OFFICE O/P EST MOD 30 MIN: CPT | Performed by: FAMILY MEDICINE

## 2020-01-29 PROCEDURE — G0439 PPPS, SUBSEQ VISIT: HCPCS | Performed by: FAMILY MEDICINE

## 2020-01-29 PROCEDURE — G0009 ADMIN PNEUMOCOCCAL VACCINE: HCPCS | Performed by: FAMILY MEDICINE

## 2020-01-29 RX ORDER — TAMSULOSIN HYDROCHLORIDE 0.4 MG/1
0.4 CAPSULE ORAL
Qty: 90 CAPSULE | Refills: 3 | Status: SHIPPED | OUTPATIENT
Start: 2020-01-29 | End: 2021-02-26 | Stop reason: SDUPTHER

## 2020-01-29 NOTE — PATIENT INSTRUCTIONS
For constipation, try OTC Benefiber; 1 tablespoon daily      Medicare Preventive Visit Patient Instructions  Thank you for completing your Welcome to Medicare Visit or Medicare Annual Wellness Visit today  Your next wellness visit will be due in one year (1/29/2021)  The screening/preventive services that you may require over the next 5-10 years are detailed below  Some tests may not apply to you based off risk factors and/or age  Screening tests ordered at today's visit but not completed yet may show as past due  Also, please note that scanned in results may not display below  Preventive Screenings:  Service Recommendations Previous Testing/Comments   Colorectal Cancer Screening  · Colonoscopy    · Fecal Occult Blood Test (FOBT)/Fecal Immunochemical Test (FIT)  · Fecal DNA/Cologuard Test  · Flexible Sigmoidoscopy Age: 54-65 years old   Colonoscopy: every 10 years (May be performed more frequently if at higher risk)  OR  FOBT/FIT: every 1 year  OR  Cologuard: every 3 years  OR  Sigmoidoscopy: every 5 years  Screening may be recommended earlier than age 48 if at higher risk for colorectal cancer  Also, an individualized decision between you and your healthcare provider will decide whether screening between the ages of 74-80 would be appropriate   Colonoscopy: 12/29/2017  FOBT/FIT: Not on file  Cologuard: Not on file  Sigmoidoscopy: Not on file    Screening Current     Prostate Cancer Screening Individualized decision between patient and health care provider in men between ages of 53-78   Medicare will cover every 12 months beginning on the day after your 50th birthday PSA: 2 7 ng/mL     Screening Not Indicated     Hepatitis C Screening Once for adults born between 1945 and 1965  More frequently in patients at high risk for Hepatitis C Hep C Antibody: Not on file       Diabetes Screening 1-2 times per year if you're at risk for diabetes or have pre-diabetes Fasting glucose: 96 mg/dL   A1C: 5 9 %    Screening Current Cholesterol Screening Once every 5 years if you don't have a lipid disorder  May order more often based on risk factors  Lipid panel: 07/16/2019    Screening Current      Other Preventive Screenings Covered by Medicare:  1  Abdominal Aortic Aneurysm (AAA) Screening: covered once if your at risk  You're considered to be at risk if you have a family history of AAA or a male between the age of 73-68 who smoking at least 100 cigarettes in your lifetime  2  Lung Cancer Screening: covers low dose CT scan once per year if you meet all of the following conditions: (1) Age 50-69; (2) No signs or symptoms of lung cancer; (3) Current smoker or have quit smoking within the last 15 years; (4) You have a tobacco smoking history of at least 30 pack years (packs per day x number of years you smoked); (5) You get a written order from a healthcare provider  3  Glaucoma Screening: covered annually if you're considered high risk: (1) You have diabetes OR (2) Family history of glaucoma OR (3)  aged 48 and older OR (3)  American aged 72 and older  3  Osteoporosis Screening: covered every 2 years if you meet one of the following conditions: (1) Have a vertebral abnormality; (2) On glucocorticoid therapy for more than 3 months; (3) Have primary hyperparathyroidism; (4) On osteoporosis medications and need to assess response to drug therapy  5  HIV Screening: covered annually if you're between the age of 12-76  Also covered annually if you are younger than 13 and older than 72 with risk factors for HIV infection  For pregnant patients, it is covered up to 3 times per pregnancy      Immunizations:  Immunization Recommendations   Influenza Vaccine Annual influenza vaccination during flu season is recommended for all persons aged >= 6 months who do not have contraindications   Pneumococcal Vaccine (Prevnar and Pneumovax)  * Prevnar = PCV13  * Pneumovax = PPSV23 Adults 25-60 years old: 1-3 doses may be recommended based on certain risk factors  Adults 72 years old: Prevnar (PCV13) vaccine recommended followed by Pneumovax (PPSV23) vaccine  If already received PPSV23 since turning 65, then PCV13 recommended at least one year after PPSV23 dose  Hepatitis B Vaccine 3 dose series if at intermediate or high risk (ex: diabetes, end stage renal disease, liver disease)   Tetanus (Td) Vaccine - COST NOT COVERED BY MEDICARE PART B Following completion of primary series, a booster dose should be given every 10 years to maintain immunity against tetanus  Td may also be given as tetanus wound prophylaxis  Tdap Vaccine - COST NOT COVERED BY MEDICARE PART B Recommended at least once for all adults  For pregnant patients, recommended with each pregnancy  Shingles Vaccine (Shingrix) - COST NOT COVERED BY MEDICARE PART B  2 shot series recommended in those aged 48 and above     Health Maintenance Due:      Topic Date Due    CRC Screening: Colonoscopy  12/29/2027     Immunizations Due:      Topic Date Due    Pneumococcal Vaccine: 65+ Years (2 of 2 - PPSV23) 07/09/2016     Advance Directives   What are advance directives? Advance directives are legal documents that state your wishes and plans for medical care  These plans are made ahead of time in case you lose your ability to make decisions for yourself  Advance directives can apply to any medical decision, such as the treatments you want, and if you want to donate organs  What are the types of advance directives? There are many types of advance directives, and each state has rules about how to use them  You may choose a combination of any of the following:  · Living will: This is a written record of the treatment you want  You can also choose which treatments you do not want, which to limit, and which to stop at a certain time  This includes surgery, medicine, IV fluid, and tube feedings  · Durable power of  for healthcare [facility]):   This is a written record that states who you want to make healthcare choices for you when you are unable to make them for yourself  This person, called a proxy, is usually a family member or a friend  You may choose more than 1 proxy  · Do not resuscitate (DNR) order:  A DNR order is used in case your heart stops beating or you stop breathing  It is a request not to have certain forms of treatment, such as CPR  A DNR order may be included in other types of advance directives  · Medical directive: This covers the care that you want if you are in a coma, near death, or unable to make decisions for yourself  You can list the treatments you want for each condition  Treatment may include pain medicine, surgery, blood transfusions, dialysis, IV or tube feedings, and a ventilator (breathing machine)  · Values history: This document has questions about your views, beliefs, and how you feel and think about life  This information can help others choose the care that you would choose  Why are advance directives important? An advance directive helps you control your care  Although spoken wishes may be used, it is better to have your wishes written down  Spoken wishes can be misunderstood, or not followed  Treatments may be given even if you do not want them  An advance directive may make it easier for your family to make difficult choices about your care  Weight Management   Why it is important to manage your weight:  Being overweight increases your risk of health conditions such as heart disease, high blood pressure, type 2 diabetes, and certain types of cancer  It can also increase your risk for osteoarthritis, sleep apnea, and other respiratory problems  Aim for a slow, steady weight loss  Even a small amount of weight loss can lower your risk of health problems  How to lose weight safely:  A safe and healthy way to lose weight is to eat fewer calories and get regular exercise   You can lose up about 1 pound a week by decreasing the number of calories you eat by 500 calories each day  Healthy meal plan for weight management:  A healthy meal plan includes a variety of foods, contains fewer calories, and helps you stay healthy  A healthy meal plan includes the following:  · Eat whole-grain foods more often  A healthy meal plan should contain fiber  Fiber is the part of grains, fruits, and vegetables that is not broken down by your body  Whole-grain foods are healthy and provide extra fiber in your diet  Some examples of whole-grain foods are whole-wheat breads and pastas, oatmeal, brown rice, and bulgur  · Eat a variety of vegetables every day  Include dark, leafy greens such as spinach, kale, faheem greens, and mustard greens  Eat yellow and orange vegetables such as carrots, sweet potatoes, and winter squash  · Eat a variety of fruits every day  Choose fresh or canned fruit (canned in its own juice or light syrup) instead of juice  Fruit juice has very little or no fiber  · Eat low-fat dairy foods  Drink fat-free (skim) milk or 1% milk  Eat fat-free yogurt and low-fat cottage cheese  Try low-fat cheeses such as mozzarella and other reduced-fat cheeses  · Choose meat and other protein foods that are low in fat  Choose beans or other legumes such as split peas or lentils  Choose fish, skinless poultry (chicken or turkey), or lean cuts of red meat (beef or pork)  Before you cook meat or poultry, cut off any visible fat  · Use less fat and oil  Try baking foods instead of frying them  Add less fat, such as margarine, sour cream, regular salad dressing and mayonnaise to foods  Eat fewer high-fat foods  Some examples of high-fat foods include french fries, doughnuts, ice cream, and cakes  · Eat fewer sweets  Limit foods and drinks that are high in sugar  This includes candy, cookies, regular soda, and sweetened drinks  Exercise:  Exercise at least 30 minutes per day on most days of the week   Some examples of exercise include walking, biking, dancing, and swimming  You can also fit in more physical activity by taking the stairs instead of the elevator or parking farther away from stores  Ask your healthcare provider about the best exercise plan for you  © Copyright [employer] 2018 Information is for End User's use only and may not be sold, redistributed or otherwise used for commercial purposes  All illustrations and images included in CareNotes® are the copyrighted property of A D A M , Inc  or [facility] Tyson Acevedo   Obesity   AMBULATORY CARE:   Obesity  is when your body mass index (BMI) is greater than 30  Your healthcare provider will use your height and weight to measure your BMI  The risks of obesity include  many health problems, such as injuries or physical disability  You may need tests to check for the following:  · Diabetes     · High blood pressure or high cholesterol     · Heart disease     · Gallbladder or liver disease     · Cancer of the colon, breast, prostate, liver, or kidney     · Sleep apnea     · Arthritis or gout  Seek care immediately if:   · You have a severe headache, confusion, or difficulty speaking  · You have weakness on one side of your body  · You have chest pain, sweating, or shortness of breath  Contact your healthcare provider if:   · You have symptoms of gallbladder or liver disease, such as pain in your upper abdomen  · You have knee or hip pain and discomfort while walking  · You have symptoms of diabetes, such as intense hunger and thirst, and frequent urination  · You have symptoms of sleep apnea, such as snoring or daytime sleepiness  · You have questions or concerns about your condition or care  Treatment for obesity  focuses on helping you lose weight to improve your health  Even a small decrease in BMI can reduce the risk for many health problems  Your healthcare provider will help you set a weight-loss goal   · Lifestyle changes  are the first step in treating obesity   These include making healthy food choices and getting regular physical activity  Your healthcare provider may suggest a weight-loss program that involves coaching, education, and therapy  · Medicine  may help you lose weight when it is used with a healthy diet and physical activity  · Surgery  can help you lose weight if you are very obese and have other health problems  There are several types of weight-loss surgery  Ask your healthcare provider for more information  Be successful losing weight:   · Set small, realistic goals  An example of a small goal is to walk for 20 minutes 5 days a week  Anther goal is to lose 5% of your body weight  · Tell friends, family members, and coworkers about your goals  and ask for their support  Ask a friend to lose weight with you, or join a weight-loss support group  · Identify foods or triggers that may cause you to overeat , and find ways to avoid them  Remove tempting high-calorie foods from your home and workplace  Place a bowl of fresh fruit on your kitchen counter  If stress causes you to eat, then find other ways to cope with stress  · Keep a diary to track what you eat and drink  Also write down how many minutes of physical activity you do each day  Weigh yourself once a week and record it in your diary  Eating changes: You will need to eat 500 to 1,000 fewer calories each day than you currently eat to lose 1 to 2 pounds a week  The following changes will help you cut calories:  · Eat smaller portions  Use small plates, no larger than 9 inches in diameter  Fill your plate half full of fruits and vegetables  Measure your food using measuring cups until you know what a serving size looks like  · Eat 3 meals and 1 or 2 snacks each day  Plan your meals in advance  [place] [employer] and eat at home most of the time  Eat slowly  · Eat fruits and vegetables at every meal   They are low in calories and high in fiber, which makes you feel full   Do not add butter, margarine, or cream sauce to vegetables  Use herbs to season steamed vegetables  · Eat less fat and fewer fried foods  Eat more baked or grilled chicken and fish  These protein sources are lower in calories and fat than red meat  Limit fast food  Dress your salads with olive oil and vinegar instead of bottled dressing  · Limit the amount of sugar you eat  Do not drink sugary beverages  Limit alcohol  Activity changes:  Physical activity is good for your body in many ways  It helps you burn calories and build strong muscles  It decreases stress and depression, and improves your mood  It can also help you sleep better  Talk to your healthcare provider before you begin an exercise program   · Exercise for at least 30 minutes 5 days a week  Start slowly  Set aside time each day for physical activity that you enjoy and that is convenient for you  It is best to do both weight training and an activity that increases your heart rate, such as walking, bicycling, or swimming  · Find ways to be more active  Do yard work and housecleaning  Walk up the stairs instead of using elevators  Spend your leisure time going to events that require walking, such as outdoor festivals or fairs  This extra physical activity can help you lose weight and keep it off  Follow up with your healthcare provider as directed: You may need to meet with a dietitian  Write down your questions so you remember to ask them during your visits  © 2017 [address] Information is for End User's use only and may not be sold, redistributed or otherwise used for commercial purposes  All illustrations and images included in CareNotes® are the copyrighted property of A D A M , Inc  or Ramesh Cervantes  The above information is an  only  It is not intended as medical advice for individual conditions or treatments   Talk to your doctor, nurse or pharmacist before following any medical regimen to see if it is safe and effective for you

## 2020-01-29 NOTE — PROGRESS NOTES
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[address] )      Still being followed by dermatologist at [facility]  Also sees advanced Derm locally (Dr Selvin Tian)  Arthritis      Continue glucosamine and Omega 3         Benign prostatic hyperplasia with urinary hesitancy      Doing well on tamsulosin 0 4 mg nightly  Relevant Medications    tamsulosin (FLOMAX) 0 4 mg      Other Visit Diagnoses     Encounter for Medicare annual wellness exam    -  Primary    Screening for prostate cancer        Relevant Orders    PSA, Total Screen    Screening for cholesterol level        Relevant Orders    Lipid Panel with Direct LDL reflex    Need for vaccination        Relevant Orders    PNEUMOCOCCAL POLYSACCHARIDE VACCINE 23-VALENT =>1YO SQ IM    BMI 31 0-31 9,adult                  Return to office in: AWV  Today  Recheck 6 months, yearly fasting blood work prior     Pneumovax 23 today    Chief Complaint     Chief Complaint   Patient presents with    Follow-up     6 months check up    Medicare Wellness Visit       History of Present Illness      Patient presents for recheck of chronic medical problems today  Overall he is feeling well  Doing well on quinapril for blood pressure  Still sees local dermatologist a dermatologist in [place]  For chronic skin condition  Last labs were done 2019        The following portions of the patient's history were reviewed and updated as appropriate: allergies, current medications, past family history, past medical history, past social history, past surgical history and problem list     Review of Systems   Review of Systems   Respiratory: Negative  Cardiovascular: Negative  Gastrointestinal: Negative  Genitourinary: Negative  Active Problem List     Patient Active Problem List   Diagnosis    Benign essential hypertension    Abnormal blood sugar    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(HCC)    Arthritis    Benign prostatic hyperplasia with urinary hesitancy    Right lower quadrant pain    Cervical strain    Lateral epicondylitis of right elbow       Objective   /70 (BP Location: Left arm, Patient Position: Sitting, Cuff Size: Adult)   Pulse 99   Temp 98 °F (36 7 °C) (Tympanic)   Resp 17   Ht 5' 10 47" (1 79 m)   Wt 102 kg (224 lb)   SpO2 96%   BMI 31 71 kg/m²     Physical Exam   Cardiovascular: Normal rate, regular rhythm, normal heart sounds and intact distal pulses  Carotids: no bruits  Ext: no edema   Pulmonary/Chest: Effort normal  No respiratory distress  He has no wheezes  He has no rales  Psychiatric: He has a normal mood and affect   His behavior is normal  Thought content normal        Pertinent Laboratory/Diagnostic Studies:   last labs reviewed    Current Medications     Current Outpatient Medications:     aspirin 81 mg chewable tablet, Chew 81 mg daily, Disp: , Rfl:     cholecalciferol (VITAMIN D3) 1,000 units tablet, Take 1 tablet by mouth daily, Disp: , Rfl:     clobetasol (TEMOVATE) 0 05 % ointment, APPLY TWICE A DAY FOR 2 WEEKS, THEN AS NEEDED FLARES, Disp: , Rfl: 1    Ivermectin (SOOLANTRA) 1 % CREA, Apply topically daily as needed, Disp: , Rfl:     Multiple Vitamins-Minerals (CENTRUM SILVER 50+MEN PO), Take 1 tablet by mouth daily  , Disp: , Rfl:     quinapril (ACCUPRIL) 20 mg tablet, TAKE 1 TABLET DAILY AS NEEDED, Disp: 90 tablet, Rfl: 3   tamsulosin (FLOMAX) 0 4 mg, Take 1 capsule (0 4 mg total) by mouth daily with dinner, Disp: 90 capsule, Rfl: 3    glucosamine-chondroitin (COSAMIN DS) 500-400 MG tablet, Take 1 tablet by mouth 3 (three) times a day, Disp: , Rfl:     patient supplied medication, Apply topically 3 (three) times a week Carmustine 0 04%/ 100gm, Disp: , Rfl:     Health Maintenance     Health Maintenance   Topic Date Due    BMI: Followup Plan  01/16/1962    Pneumococcal Vaccine: 65+ Years (2 of 2 - PPSV23) 07/09/2016    Medicare Annual Wellness Visit (AWV)  01/21/2020    DTaP,Tdap,and Td Vaccines (2 - Td) 09/01/2020    BMI: Adult  12/17/2020    Fall Risk  12/18/2020    Depression Screening PHQ  12/18/2020    CRC Screening: Colonoscopy  12/29/2027    Influenza Vaccine  Completed    Pneumococcal Vaccine: Pediatrics (0 to 5 Years) and At-Risk Patients (6 to 59 Years)  Aged Out    HIB Vaccine  Aged Out    Hepatitis B Vaccine  Aged Out    IPV Vaccine  Aged Out    Hepatitis A Vaccine  Aged Out    Meningococcal ACWY Vaccine  Aged Out    HPV Vaccine  Aged [employer] History   Administered Date(s) Administered    INFLUENZA 09/22/2015, 09/19/2016, 09/15/2018    Influenza Split High Dose Preservative Free IM 10/10/2014, 09/22/2015, 09/19/2016, 08/31/2017    Influenza TIV (IM) 09/14/2012    Influenza, high dose seasonal 0 5 mL 09/26/2019    Pneumococcal Conjugate 13-Valent 07/09/2015    Pneumococcal Polysaccharide PPV23 11/01/2006    Tdap 09/01/2010    Zoster 07/18/2008    Zoster Vaccine Recombinant 04/15/2019, 06/17/2019       Kiki Guzmán DO  [facility]  BMI Counseling: Body mass index is 31 71 kg/m²  The BMI is above normal  Nutrition recommendations include 3-5 servings of fruits/vegetables daily

## 2020-01-29 NOTE — PROGRESS NOTES
Assessment and Plan:    PATIENT PRESENTS FOR MEDICARE WELLNESS VISIT TODAY  PATIENT HAS A LIVING WILL AND HEALTHCARE POWER OF   DEPRESSION SCREEN AND FALL RISK WERE NEGATIVE  PATIENT IS DUE FOR PNEUMOVAX 23 TODAY  CURRENT WITH COLONOSCOPY AND PSA SCREENING      Problem List Items Addressed This Visit     Benign essential hypertension    Abnormal blood sugar    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(HCC)    Arthritis    Benign prostatic hyperplasia with urinary hesitancy      Other Visit Diagnoses     Encounter for Medicare annual wellness exam    -  Primary    Screening for prostate cancer        Screening for cholesterol level        Need for vaccination               Preventive health issues were discussed with patient, and age appropriate screening tests were ordered as noted in patient's After Visit Summary  Personalized health advice and appropriate referrals for health education or preventive services given if needed, as noted in patient's After Visit Summary  History of Present Illness:     Patient presents for Welcome to Medicare visit  Patient Care Team:  Salina Greene DO as PCP - General     Review of Systems:     Review of Systems   Problem List:     Patient Active Problem List   Diagnosis    Benign essential hypertension    Abnormal blood sugar    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(HCC)    Arthritis    Benign prostatic hyperplasia with urinary hesitancy    Right lower quadrant pain    Cervical strain    Lateral epicondylitis of right elbow      Past Medical and Surgical History:     Past Medical History:   Diagnosis Date    Abnormal blood chemistry     last assessed: 5/1/2013    Abnormal blood sugar     last assessed: 12/18/2014    Allergic rhinitis     last assessed: 11/8/2013    Diabetes mellitus ([facility] [address] )     last assessed: 4/30/2013    Nodule of finger of right hand     last assessed: 10/19/2015    Obstruction of right eustachian tube     last assessed: 10/11/2016     History reviewed   No pertinent surgical history     Family History:     Family History   Problem Relation Age of Onset    No Known Problems Mother     No Known Problems Father       Social History:     Social History     Socioeconomic History    Marital status: /Civil Union     Spouse name: None    Number of children: None    Years of education: None    Highest education level: None   Occupational History    None   Social Needs    Financial resource strain: None    Food insecurity:     Worry: None     Inability: None    Transportation needs:     Medical: None     Non-medical: None   Tobacco Use    Smoking status: Never Smoker    Smokeless tobacco: Never Used   Substance and Sexual Activity    Alcohol use: None    Drug use: No    Sexual activity: None   Lifestyle    Physical activity:     Days per week: None     Minutes per session: None    Stress: None   Relationships    Social connections:     Talks on phone: None     Gets together: None     Attends Oriental orthodox service: None     Active member of club or organization: None     Attends meetings of clubs or organizations: None     Relationship status: None    Intimate partner violence:     Fear of current or ex partner: None     Emotionally abused: None     Physically abused: None     Forced sexual activity: None   Other Topics Concern    None   Social History Narrative    DOES NOT CONSUME CAFFEINE      Medications and Allergies:     Current Outpatient Medications   Medication Sig Dispense Refill    aspirin 81 mg chewable tablet Chew 81 mg daily      cholecalciferol (VITAMIN D3) 1,000 units tablet Take 1 tablet by mouth daily      clobetasol (TEMOVATE) 0 05 % ointment APPLY TWICE A DAY FOR 2 WEEKS, THEN AS NEEDED FLARES  1    Ivermectin (SOOLANTRA) 1 % CREA Apply topically daily as needed      Multiple Vitamins-Minerals (CENTRUM SILVER 50+MEN PO) Take 1 tablet by mouth daily        quinapril (ACCUPRIL) 20 mg tablet TAKE 1 TABLET DAILY AS NEEDED 90 tablet 3  tamsulosin (FLOMAX) 0 4 mg TAKE 1 CAPSULE (0 4 MG TOTAL) BY MOUTH DAILY WITH DINNER 90 capsule 1    glucosamine-chondroitin (COSAMIN DS) 500-400 MG tablet Take 1 tablet by mouth 3 (three) times a day      patient supplied medication Apply topically 3 (three) times a week Carmustine 0 04%/ 100gm       No current facility-administered medications for this visit  Allergies   Allergen Reactions    Amoxicillin Diarrhea      Immunizations:     Immunization History   Administered Date(s) Administered    INFLUENZA 09/22/2015, 09/19/2016, 09/15/2018    Influenza Split High Dose Preservative Free IM 10/10/2014, 09/22/2015, 09/19/2016, 08/31/2017    Influenza TIV (IM) 09/14/2012    Influenza, high dose seasonal 0 5 mL 09/26/2019    Pneumococcal Conjugate 13-Valent 07/09/2015    Pneumococcal Polysaccharide PPV23 11/01/2006    Tdap 09/01/2010    Zoster 07/18/2008    Zoster Vaccine Recombinant 04/15/2019, 06/17/2019      Health Maintenance:         Topic Date Due    CRC Screening: Colonoscopy  12/29/2027         Topic Date Due    Pneumococcal Vaccine: 65+ Years (2 of 2 - PPSV23) 07/09/2016      Medicare Screening Tests and Risk Assessments:     Blanca Lazaro is here for his Subsequent Wellness visit  Last Medicare Wellness visit information reviewed, patient interviewed and updates made to the record today  Health Risk Assessment:   Patient rates overall health as good  Patient feels that their physical health rating is same  Eyesight was rated as same  Hearing was rated as same  Patient feels that their emotional and mental health rating is same  Pain experienced in the last 7 days has been none  Patient states that he has experienced no weight loss or gain in last 6 months  Depression Screening:   PHQ-2 Score: 0      Fall Risk Screening:    In the past year, patient has experienced: no history of falling in past year      Home Safety:  Patient does not have trouble with stairs inside or outside of their home  Patient has working smoke alarms and has working carbon monoxide detector  Home safety hazards include: none  Nutrition:   Current diet is Regular  Medications:   Patient is currently taking over-the-counter supplements  OTC medications include: see medication list  Patient is able to manage medications  Activities of Daily Living (ADLs)/Instrumental Activities of Daily Living (IADLs):   Walk and transfer into and out of bed and chair?: Yes  Dress and groom yourself?: Yes    Bathe or shower yourself?: Yes    Feed yourself? Yes  Do your laundry/housekeeping?: Yes  Manage your money, pay your bills and track your expenses?: Yes  Make your own meals?: Yes    Do your own shopping?: Yes    Advance Care Planning:   Living will: Yes    Durable POA for healthcare:  Yes    Advanced directive: Yes    Advanced directive counseling given: Yes    Five wishes given: Yes    End of Life Decisions reviewed with patient: Yes    Provider agrees with end of life decisions: Yes      Cognitive Screening:   Provider or family/friend/caregiver concerned regarding cognition?: No    PREVENTIVE SCREENINGS      Cardiovascular Screening:    General: Screening Current      Diabetes Screening:     General: Screening Current      Colorectal Cancer Screening:     General: Screening Current      Prostate Cancer Screening:    General: Screening Not Indicated      Osteoporosis Screening:    General: Risks and Benefits Discussed      Abdominal Aortic Aneurysm (AAA) Screening:        General: Risks and Benefits Discussed      Lung Cancer Screening:     General: Risks and Benefits Discussed      Hepatitis C Screening:    General: Risks and Benefits Discussed    No exam data present     Physical Exam:     /70 (BP Location: Left arm, Patient Position: Sitting, Cuff Size: Adult)   Pulse 99   Temp 98 °F (36 7 °C) (Tympanic)   Resp 17   Ht 5' 10 47" (1 79 m)   Wt 102 kg (224 lb)   SpO2 96%   BMI 31 71 kg/m²     Physical Exam    Tian Woods, DO

## 2020-01-29 NOTE — ASSESSMENT & PLAN NOTE
Still being followed by dermatologist at [facility]  Also sees advanced Derm locally (Dr Anna Bailey)

## 2020-02-25 ENCOUNTER — OFFICE VISIT (OUTPATIENT)
Dept: FAMILY MEDICINE CLINIC | Facility: CLINIC | Age: 75
End: 2020-02-25
Payer: MEDICARE

## 2020-02-25 VITALS
OXYGEN SATURATION: 96 % | TEMPERATURE: 98.2 F | HEIGHT: 71 IN | BODY MASS INDEX: 31.44 KG/M2 | WEIGHT: 224.6 LBS | SYSTOLIC BLOOD PRESSURE: 112 MMHG | RESPIRATION RATE: 17 BRPM | DIASTOLIC BLOOD PRESSURE: 72 MMHG | HEART RATE: 84 BPM

## 2020-02-25 DIAGNOSIS — J01.90 ACUTE RHINOSINUSITIS: Primary | ICD-10-CM

## 2020-02-25 PROBLEM — R73.02 IMPAIRED GLUCOSE TOLERANCE: Status: ACTIVE | Noted: 2020-02-25

## 2020-02-25 PROBLEM — C83.398: Status: ACTIVE | Noted: 2019-11-03

## 2020-02-25 PROBLEM — R10.31 RIGHT LOWER QUADRANT PAIN: Status: RESOLVED | Noted: 2019-03-30 | Resolved: 2020-02-25

## 2020-02-25 PROBLEM — C83.39: Status: ACTIVE | Noted: 2019-11-03

## 2020-02-25 PROCEDURE — 99213 OFFICE O/P EST LOW 20 MIN: CPT | Performed by: PHYSICIAN ASSISTANT

## 2020-02-25 PROCEDURE — 1160F RVW MEDS BY RX/DR IN RCRD: CPT | Performed by: PHYSICIAN ASSISTANT

## 2020-02-25 PROCEDURE — 4040F PNEUMOC VAC/ADMIN/RCVD: CPT | Performed by: PHYSICIAN ASSISTANT

## 2020-02-25 PROCEDURE — 3074F SYST BP LT 130 MM HG: CPT | Performed by: PHYSICIAN ASSISTANT

## 2020-02-25 PROCEDURE — 3078F DIAST BP <80 MM HG: CPT | Performed by: PHYSICIAN ASSISTANT

## 2020-02-25 PROCEDURE — 1170F FXNL STATUS ASSESSED: CPT | Performed by: PHYSICIAN ASSISTANT

## 2020-02-25 PROCEDURE — 1036F TOBACCO NON-USER: CPT | Performed by: PHYSICIAN ASSISTANT

## 2020-02-25 RX ORDER — PREDNISONE 10 MG/1
TABLET ORAL
Qty: 21 TABLET | Refills: 0 | Status: SHIPPED | OUTPATIENT
Start: 2020-02-25 | End: 2020-03-12

## 2020-02-25 RX ORDER — AZITHROMYCIN 250 MG/1
TABLET, FILM COATED ORAL
Qty: 6 TABLET | Refills: 0 | Status: SHIPPED | OUTPATIENT
Start: 2020-02-25 | End: 2020-03-01

## 2020-03-04 NOTE — PROGRESS NOTES
Assessment/Plan:    1  Acute rhinosinusitis  Will cover with Z-Andrey and prednisone  Patient is penicillin allergic  Finish course completely  Recommended probiotic  No NSAIDs during course  Continue over-the-counter medications as needed for symptom relief  Tylenol as needed  Plenty of fluids and rest   Recommended warm tea with honey  Salt water gargles  Return to care if symptoms worsen or fail to improve  - azithromycin (Zithromax) 250 mg tablet; Take 2 tablets (500 mg total) by mouth daily for 1 day, THEN 1 tablet (250 mg total) daily for 4 days  Dispense: 6 tablet; Refill: 0  - predniSONE 10 mg tablet; Take 6 tablets day one, 5 tablets day two, 4 tablets day three, 3 tablets day four, 2 tablets day five, and 1 tablet day six  Dispense: 21 tablet; Refill: 0      Patient Active Problem List   Diagnosis    Benign essential hypertension    Abnormal blood sugar    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(HCC)    Arthritis    Benign prostatic hyperplasia with urinary hesitancy    Cervical strain    Lateral epicondylitis of right elbow    Impaired glucose tolerance    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)       Subjective:      Patient ID: Danis Khan is a 67 y o  male  Patient is here complaining of congestion for 1 week  Denies fever and chills  Admits to congestion, rhinorrhea, and postnasal drip  Admits to sinus pressure, pain, and headaches  Denies earaches  Admits to sore throat  Admits to productive cough  Denies wheezing and shortness of breath  Denies nausea, vomiting, and diarrhea  Denies abdominal pain  Denies rash  He has been taking over-the-counter medications with no relief  Denies ill contacts  Denies travel history        The following portions of the patient's history were reviewed and updated as appropriate: allergies, current medications, past family history, past medical history, past social history, past surgical history and problem list     Review of Systems   Constitutional: Positive for fatigue  Negative for chills and fever  HENT: Positive for congestion, postnasal drip, rhinorrhea, sinus pressure, sinus pain and sore throat  Negative for ear pain and trouble swallowing  Respiratory: Positive for cough  Negative for chest tightness, shortness of breath and wheezing  Cardiovascular: Negative for chest pain, palpitations and leg swelling  Gastrointestinal: Negative for abdominal pain, diarrhea, nausea and vomiting  Musculoskeletal: Negative for myalgias  Skin: Negative for rash  Neurological: Positive for headaches  Negative for dizziness and light-headedness  Hematological: Negative for adenopathy  Objective:      /72 (BP Location: Left arm, Patient Position: Sitting, Cuff Size: Large)   Pulse 84   Temp 98 2 °F (36 8 °C) (Tympanic)   Resp 17   Ht 5' 10 5" (1 791 m)   Wt 102 kg (224 lb 9 6 oz)   SpO2 96%   BMI 31 77 kg/m²          Physical Exam   Constitutional: He is oriented to person, place, and time  He appears well-developed and well-nourished  HENT:   Head: Normocephalic and atraumatic  Right Ear: Hearing, tympanic membrane, external ear and ear canal normal    Left Ear: Hearing, tympanic membrane, external ear and ear canal normal    Nose: Mucosal edema and rhinorrhea present  Right sinus exhibits maxillary sinus tenderness and frontal sinus tenderness  Left sinus exhibits maxillary sinus tenderness and frontal sinus tenderness  Mouth/Throat: Uvula is midline, oropharynx is clear and moist and mucous membranes are normal    Eyes: Pupils are equal, round, and reactive to light  Conjunctivae are normal    Neck: Normal range of motion  Neck supple  Cardiovascular: Normal rate, regular rhythm, S1 normal, S2 normal, normal heart sounds and intact distal pulses  Pulmonary/Chest: Effort normal and breath sounds normal    Abdominal: Soft  Normal appearance and bowel sounds are normal  He exhibits no mass   There is no hepatosplenomegaly  There is no tenderness  Musculoskeletal: Normal range of motion  Lymphadenopathy:     He has no cervical adenopathy  Neurological: He is alert and oriented to person, place, and time  Skin: Skin is warm, dry and intact  No rash noted  Psychiatric: He has a normal mood and affect  His behavior is normal  Judgment and thought content normal    Nursing note and vitals reviewed  Current Outpatient Medications on File Prior to Visit   Medication Sig Dispense Refill    aspirin 81 mg chewable tablet Chew 81 mg daily      cholecalciferol (VITAMIN D3) 1,000 units tablet Take 1 tablet by mouth daily      clobetasol (TEMOVATE) 0 05 % ointment APPLY TWICE A DAY FOR 2 WEEKS, THEN AS NEEDED FLARES  1    glucosamine-chondroitin (COSAMIN DS) 500-400 MG tablet Take 1 tablet by mouth 3 (three) times a day      Ivermectin (SOOLANTRA) 1 % CREA Apply topically daily as needed      Multiple Vitamins-Minerals (CENTRUM SILVER 50+MEN PO) Take 1 tablet by mouth daily        patient supplied medication Apply topically 3 (three) times a week Carmustine 0 04%/ 100gm      quinapril (ACCUPRIL) 20 mg tablet TAKE 1 TABLET DAILY AS NEEDED 90 tablet 3    tamsulosin (FLOMAX) 0 4 mg Take 1 capsule (0 4 mg total) by mouth daily with dinner 90 capsule 3     No current facility-administered medications on file prior to visit

## 2020-03-09 ENCOUNTER — TELEPHONE (OUTPATIENT)
Dept: FAMILY MEDICINE CLINIC | Facility: CLINIC | Age: 75
End: 2020-03-09

## 2020-03-09 NOTE — TELEPHONE ENCOUNTER
Patient left message on rx line requesting refill of montelukast be sent to [facility] [address]  Said you had him on this in the past for his allergy like symptoms

## 2020-03-10 DIAGNOSIS — J30.1 ALLERGIC RHINITIS DUE TO POLLEN, UNSPECIFIED SEASONALITY: Primary | ICD-10-CM

## 2020-03-10 RX ORDER — MONTELUKAST SODIUM 10 MG/1
10 TABLET ORAL
Qty: 90 TABLET | Refills: 0 | Status: SHIPPED | OUTPATIENT
Start: 2020-03-10 | End: 2020-06-08

## 2020-03-12 ENCOUNTER — OFFICE VISIT (OUTPATIENT)
Dept: FAMILY MEDICINE CLINIC | Facility: CLINIC | Age: 75
End: 2020-03-12
Payer: MEDICARE

## 2020-03-12 VITALS
TEMPERATURE: 96.9 F | HEART RATE: 72 BPM | RESPIRATION RATE: 17 BRPM | DIASTOLIC BLOOD PRESSURE: 78 MMHG | WEIGHT: 221 LBS | OXYGEN SATURATION: 95 % | BODY MASS INDEX: 30.94 KG/M2 | HEIGHT: 71 IN | SYSTOLIC BLOOD PRESSURE: 108 MMHG

## 2020-03-12 DIAGNOSIS — R05.9 COUGH: ICD-10-CM

## 2020-03-12 DIAGNOSIS — M79.652 LEFT THIGH PAIN: Primary | ICD-10-CM

## 2020-03-12 PROCEDURE — 3074F SYST BP LT 130 MM HG: CPT | Performed by: FAMILY MEDICINE

## 2020-03-12 PROCEDURE — 99214 OFFICE O/P EST MOD 30 MIN: CPT | Performed by: FAMILY MEDICINE

## 2020-03-12 PROCEDURE — 1036F TOBACCO NON-USER: CPT | Performed by: FAMILY MEDICINE

## 2020-03-12 PROCEDURE — 3008F BODY MASS INDEX DOCD: CPT | Performed by: FAMILY MEDICINE

## 2020-03-12 PROCEDURE — 3078F DIAST BP <80 MM HG: CPT | Performed by: FAMILY MEDICINE

## 2020-03-12 PROCEDURE — 1160F RVW MEDS BY RX/DR IN RCRD: CPT | Performed by: FAMILY MEDICINE

## 2020-03-12 PROCEDURE — 4040F PNEUMOC VAC/ADMIN/RCVD: CPT | Performed by: FAMILY MEDICINE

## 2020-03-12 RX ORDER — BENZONATATE 200 MG/1
200 CAPSULE ORAL 3 TIMES DAILY PRN
Qty: 30 CAPSULE | Refills: 2 | Status: SHIPPED | OUTPATIENT
Start: 2020-03-12 | End: 2020-05-18

## 2020-03-12 NOTE — PROGRESS NOTES
[facility]      NAME: Arturo Shaw  AGE: 67 y o  SEX: male  : 1945   MRN: [id number]    DATE: 3/12/2020  TIME: 12:27 PM    Assessment and Plan     Problem List Items Addressed This Visit     Left thigh pain - Primary     1 month hx of L ant thigh pain  No current back pain  If pain/ paresthesia persists, will send for EMG left lower extremity ( order given)  Patient will observe for now             Relevant Orders    EMG 1 Upper/1 Lower Neuropathy    Cough     Ongoing cough, congestion  Pt seen on , given zpak w/ some improvement  Still w/ tickle cough  Otherwise feels well  Examination today is essentially unremarkable  Lungs are clear  Will give Rx for Tessalon 200 t i d  P r n   If symptoms persist, will sent for chest x-ray             Relevant Medications    benzonatate (TESSALON) 200 MG capsule              Return to office in:  Call further problems    Chief Complaint     Chief Complaint   Patient presents with    Leg Pain     Left x1M    Follow-up     Cough/Chest Congestion (Saw VL  for Rhinosinusitis)       History of Present Illness     1 month hx of L ant thigh pain  No current back pain  Ongoing cough, congestion  Pt seen on , given zpak w/ some improvement  Still w/ tickle cough  Otherwise feels well  The following portions of the patient's history were reviewed and updated as appropriate: allergies, current medications, past family history, past medical history, past social history, past surgical history and problem list     Review of Systems   Review of Systems   Respiratory: Negative  Cardiovascular: Negative  Gastrointestinal: Negative  Genitourinary: Negative          Active Problem List     Patient Active Problem List   Diagnosis    Benign essential hypertension    Abnormal blood sugar    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(HCC)    Arthritis    Benign prostatic hyperplasia with urinary hesitancy    Cervical strain    Lateral epicondylitis of right elbow    Impaired glucose tolerance    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)    Left thigh pain    Cough       Objective   /78 (BP Location: Left arm, Patient Position: Sitting, Cuff Size: Large)   Pulse 72   Temp (!) 96 9 °F (36 1 °C) (Tympanic)   Resp 17   Ht 5' 10 5" (1 791 m)   Wt 100 kg (221 lb)   SpO2 95%   BMI 31 26 kg/m²     Physical Exam   Cardiovascular: Normal rate, regular rhythm, normal heart sounds and intact distal pulses  Carotids: no bruits  Ext: no edema   Pulmonary/Chest: Effort normal  No respiratory distress  He has no wheezes  He has no rales  Psychiatric: He has a normal mood and affect   His behavior is normal  Thought content normal        Pertinent Laboratory/Diagnostic Studies:   recent office visit note reviewed from February 25th    Current Medications     Current Outpatient Medications:     aspirin 81 mg chewable tablet, Chew 81 mg daily, Disp: , Rfl:     cholecalciferol (VITAMIN D3) 1,000 units tablet, Take 1 tablet by mouth daily, Disp: , Rfl:     clobetasol (TEMOVATE) 0 05 % ointment, APPLY TWICE A DAY FOR 2 WEEKS, THEN AS NEEDED FLARES, Disp: , Rfl: 1    glucosamine-chondroitin (COSAMIN DS) 500-400 MG tablet, Take 1 tablet by mouth 3 (three) times a day, Disp: , Rfl:     Ivermectin (SOOLANTRA) 1 % CREA, Apply topically daily as needed, Disp: , Rfl:     montelukast (SINGULAIR) 10 mg tablet, Take 1 tablet (10 mg total) by mouth daily at bedtime, Disp: 90 tablet, Rfl: 0    Multiple Vitamins-Minerals (CENTRUM SILVER 50+MEN PO), Take 1 tablet by mouth daily  , Disp: , Rfl:     patient supplied medication, Apply topically 3 (three) times a week Carmustine 0 04%/ 100gm, Disp: , Rfl:     quinapril (ACCUPRIL) 20 mg tablet, TAKE 1 TABLET DAILY AS NEEDED, Disp: 90 tablet, Rfl: 3    tamsulosin (FLOMAX) 0 4 mg, Take 1 capsule (0 4 mg total) by mouth daily with dinner, Disp: 90 capsule, Rfl: 3    benzonatate (TESSALON) 200 MG capsule, Take 1 capsule (200 mg total) by mouth 3 (three) times a day as needed for cough, Disp: 30 capsule, Rfl: 2    Health Maintenance     Health Maintenance   Topic Date Due    DTaP,Tdap,and Td Vaccines (2 - Td) 09/01/2020    Fall Risk  01/29/2021    Depression Screening PHQ  01/29/2021    Medicare Annual Wellness Visit (AWV)  01/29/2021    BMI: Followup Plan  01/29/2021    BMI: Adult  02/25/2021    CRC Screening: Colonoscopy  12/29/2027    Influenza Vaccine  Completed    Pneumococcal Vaccine: 65+ Years  Completed    Pneumococcal Vaccine: Pediatrics (0 to 5 Years) and At-Risk Patients (6 to 59 Years)  Aged Out    HIB Vaccine  Aged Out    Hepatitis B Vaccine  Aged Out    IPV Vaccine  Aged Out    Hepatitis A Vaccine  Aged Out    Meningococcal ACWY Vaccine  Aged Out    HPV Vaccine  Aged [employer] History   Administered Date(s) Administered    INFLUENZA 09/22/2015, 09/19/2016, 09/15/2018    Influenza Split High Dose Preservative Free IM 10/10/2014, 09/22/2015, 09/19/2016, 08/31/2017    Influenza TIV (IM) 09/14/2012    Influenza, high dose seasonal 0 5 mL 09/26/2019    Pneumococcal Conjugate 13-Valent 07/09/2015    Pneumococcal Polysaccharide PPV23 11/01/2006, 01/29/2020    Tdap 09/01/2010    Zoster 07/18/2008    Zoster Vaccine Recombinant 04/15/2019, 06/17/2019       Juve Valdez DO  [facility]

## 2020-03-12 NOTE — ASSESSMENT & PLAN NOTE
Ongoing cough, congestion  Pt seen on 2/25, given zpak w/ some improvement  Still w/ tickle cough  Otherwise feels well  Examination today is essentially unremarkable  Lungs are clear    Will give Rx for Tessalon 200 t i d  P r n   If symptoms persist, will sent for chest x-ray

## 2020-03-18 ENCOUNTER — HOSPITAL ENCOUNTER (OUTPATIENT)
Dept: NEUROLOGY | Facility: CLINIC | Age: 75
Discharge: HOME | End: 2020-03-18
Payer: MEDICARE

## 2020-03-18 DIAGNOSIS — M79.652 LEFT THIGH PAIN: ICD-10-CM

## 2020-03-18 PROCEDURE — 95886 MUSC TEST DONE W/N TEST COMP: CPT | Performed by: PHYSICAL MEDICINE & REHABILITATION

## 2020-03-18 PROCEDURE — 95909 NRV CNDJ TST 5-6 STUDIES: CPT | Performed by: PHYSICAL MEDICINE & REHABILITATION

## 2020-04-14 ENCOUNTER — TELEMEDICINE (OUTPATIENT)
Dept: FAMILY MEDICINE CLINIC | Facility: CLINIC | Age: 75
End: 2020-04-14
Payer: MEDICARE

## 2020-04-14 DIAGNOSIS — M79.605 PAIN IN BOTH LOWER EXTREMITIES: Primary | ICD-10-CM

## 2020-04-14 DIAGNOSIS — M79.604 PAIN IN BOTH LOWER EXTREMITIES: Primary | ICD-10-CM

## 2020-04-14 PROCEDURE — 99214 OFFICE O/P EST MOD 30 MIN: CPT | Performed by: FAMILY MEDICINE

## 2020-04-14 RX ORDER — DICLOFENAC SODIUM 75 MG/1
75 TABLET, DELAYED RELEASE ORAL 2 TIMES DAILY
Qty: 30 TABLET | Refills: 1 | Status: SHIPPED | OUTPATIENT
Start: 2020-04-14 | End: 2020-07-29

## 2020-04-28 ENCOUNTER — TELEMEDICINE (OUTPATIENT)
Dept: FAMILY MEDICINE CLINIC | Facility: CLINIC | Age: 75
End: 2020-04-28
Payer: MEDICARE

## 2020-04-28 DIAGNOSIS — M54.16 RADICULOPATHY, LUMBAR REGION: Primary | ICD-10-CM

## 2020-04-28 PROCEDURE — 99441 PR PHYS/QHP TELEPHONE EVALUATION 5-10 MIN: CPT | Performed by: FAMILY MEDICINE

## 2020-04-28 RX ORDER — PREDNISONE 10 MG/1
TABLET ORAL
Qty: 30 TABLET | Refills: 0 | Status: SHIPPED | OUTPATIENT
Start: 2020-04-28 | End: 2020-04-29 | Stop reason: SINTOL

## 2020-04-29 ENCOUNTER — TELEPHONE (OUTPATIENT)
Dept: FAMILY MEDICINE CLINIC | Facility: CLINIC | Age: 75
End: 2020-04-29

## 2020-04-29 ENCOUNTER — APPOINTMENT (OUTPATIENT)
Dept: RADIOLOGY | Facility: CLINIC | Age: 75
End: 2020-04-29
Payer: MEDICARE

## 2020-04-29 DIAGNOSIS — M54.16 RADICULOPATHY, LUMBAR REGION: ICD-10-CM

## 2020-04-29 DIAGNOSIS — M54.16 RADICULOPATHY, LUMBAR REGION: Primary | ICD-10-CM

## 2020-04-29 PROCEDURE — 72110 X-RAY EXAM L-2 SPINE 4/>VWS: CPT

## 2020-04-29 RX ORDER — GABAPENTIN 100 MG/1
100 CAPSULE ORAL 3 TIMES DAILY
Qty: 30 CAPSULE | Refills: 1 | Status: SHIPPED | OUTPATIENT
Start: 2020-04-29 | End: 2020-06-03

## 2020-05-08 ENCOUNTER — TELEMEDICINE (OUTPATIENT)
Dept: FAMILY MEDICINE CLINIC | Facility: CLINIC | Age: 75
End: 2020-05-08
Payer: MEDICARE

## 2020-05-08 DIAGNOSIS — M54.16 RADICULOPATHY, LUMBAR REGION: Primary | ICD-10-CM

## 2020-05-08 PROCEDURE — 99213 OFFICE O/P EST LOW 20 MIN: CPT | Performed by: FAMILY MEDICINE

## 2020-05-08 RX ORDER — OXYCODONE HYDROCHLORIDE AND ACETAMINOPHEN 5; 325 MG/1; MG/1
1 TABLET ORAL EVERY 4 HOURS PRN
Qty: 20 TABLET | Refills: 0 | Status: SHIPPED | OUTPATIENT
Start: 2020-05-08 | End: 2021-01-29

## 2020-05-18 ENCOUNTER — OFFICE VISIT (OUTPATIENT)
Dept: FAMILY MEDICINE CLINIC | Facility: CLINIC | Age: 75
End: 2020-05-18
Payer: MEDICARE

## 2020-05-18 VITALS
HEART RATE: 88 BPM | RESPIRATION RATE: 16 BRPM | DIASTOLIC BLOOD PRESSURE: 70 MMHG | TEMPERATURE: 97 F | HEIGHT: 70 IN | SYSTOLIC BLOOD PRESSURE: 110 MMHG | BODY MASS INDEX: 31.5 KG/M2 | OXYGEN SATURATION: 95 % | WEIGHT: 220 LBS

## 2020-05-18 DIAGNOSIS — M54.16 RADICULOPATHY, LUMBAR REGION: Primary | ICD-10-CM

## 2020-05-18 PROCEDURE — 3078F DIAST BP <80 MM HG: CPT | Performed by: FAMILY MEDICINE

## 2020-05-18 PROCEDURE — 3074F SYST BP LT 130 MM HG: CPT | Performed by: FAMILY MEDICINE

## 2020-05-18 PROCEDURE — 4040F PNEUMOC VAC/ADMIN/RCVD: CPT | Performed by: FAMILY MEDICINE

## 2020-05-18 PROCEDURE — 1160F RVW MEDS BY RX/DR IN RCRD: CPT | Performed by: FAMILY MEDICINE

## 2020-05-18 PROCEDURE — 1036F TOBACCO NON-USER: CPT | Performed by: FAMILY MEDICINE

## 2020-05-18 PROCEDURE — 3008F BODY MASS INDEX DOCD: CPT | Performed by: FAMILY MEDICINE

## 2020-05-18 PROCEDURE — 99213 OFFICE O/P EST LOW 20 MIN: CPT | Performed by: FAMILY MEDICINE

## 2020-05-21 ENCOUNTER — TELEPHONE (OUTPATIENT)
Dept: FAMILY MEDICINE CLINIC | Facility: CLINIC | Age: 75
End: 2020-05-21

## 2020-06-03 ENCOUNTER — HOSPITAL ENCOUNTER (OUTPATIENT)
Dept: MRI IMAGING | Facility: HOSPITAL | Age: 75
Discharge: HOME | End: 2020-06-03
Payer: MEDICARE

## 2020-06-03 DIAGNOSIS — M54.16 RADICULOPATHY, LUMBAR REGION: Primary | ICD-10-CM

## 2020-06-03 DIAGNOSIS — M54.16 RADICULOPATHY, LUMBAR REGION: ICD-10-CM

## 2020-06-03 PROCEDURE — 72148 MRI LUMBAR SPINE W/O DYE: CPT

## 2020-06-08 DIAGNOSIS — J30.1 ALLERGIC RHINITIS DUE TO POLLEN, UNSPECIFIED SEASONALITY: ICD-10-CM

## 2020-06-08 RX ORDER — MONTELUKAST SODIUM 10 MG/1
TABLET ORAL
Qty: 90 TABLET | Refills: 0 | Status: SHIPPED | OUTPATIENT
Start: 2020-06-08 | End: 2020-09-10 | Stop reason: SDUPTHER

## 2020-06-11 ENCOUNTER — TELEPHONE (OUTPATIENT)
Dept: PAIN MEDICINE | Facility: MEDICAL CENTER | Age: 75
End: 2020-06-11

## 2020-06-12 ENCOUNTER — CONSULT (OUTPATIENT)
Dept: PAIN MEDICINE | Facility: MEDICAL CENTER | Age: 75
End: 2020-06-12
Payer: MEDICARE

## 2020-06-12 VITALS
BODY MASS INDEX: 28.89 KG/M2 | SYSTOLIC BLOOD PRESSURE: 112 MMHG | HEART RATE: 76 BPM | WEIGHT: 218 LBS | RESPIRATION RATE: 14 BRPM | DIASTOLIC BLOOD PRESSURE: 67 MMHG | HEIGHT: 73 IN | TEMPERATURE: 98.9 F

## 2020-06-12 DIAGNOSIS — M48.061 SPINAL STENOSIS OF LUMBAR REGION WITHOUT NEUROGENIC CLAUDICATION: Primary | ICD-10-CM

## 2020-06-12 DIAGNOSIS — M54.16 RADICULOPATHY, LUMBAR REGION: ICD-10-CM

## 2020-06-12 PROCEDURE — 3008F BODY MASS INDEX DOCD: CPT | Performed by: PHYSICAL MEDICINE & REHABILITATION

## 2020-06-12 PROCEDURE — 4040F PNEUMOC VAC/ADMIN/RCVD: CPT | Performed by: PHYSICAL MEDICINE & REHABILITATION

## 2020-06-12 PROCEDURE — 99204 OFFICE O/P NEW MOD 45 MIN: CPT | Performed by: PHYSICAL MEDICINE & REHABILITATION

## 2020-06-12 PROCEDURE — 1160F RVW MEDS BY RX/DR IN RCRD: CPT | Performed by: PHYSICAL MEDICINE & REHABILITATION

## 2020-06-12 PROCEDURE — 1036F TOBACCO NON-USER: CPT | Performed by: PHYSICAL MEDICINE & REHABILITATION

## 2020-06-12 PROCEDURE — 3078F DIAST BP <80 MM HG: CPT | Performed by: PHYSICAL MEDICINE & REHABILITATION

## 2020-06-12 PROCEDURE — 3074F SYST BP LT 130 MM HG: CPT | Performed by: PHYSICAL MEDICINE & REHABILITATION

## 2020-06-29 ENCOUNTER — HOSPITAL ENCOUNTER (OUTPATIENT)
Dept: RADIOLOGY | Facility: MEDICAL CENTER | Age: 75
Discharge: HOME | End: 2020-06-29
Attending: PHYSICAL MEDICINE & REHABILITATION | Admitting: PHYSICAL MEDICINE & REHABILITATION
Payer: MEDICARE

## 2020-06-29 VITALS
SYSTOLIC BLOOD PRESSURE: 121 MMHG | TEMPERATURE: 98.4 F | HEART RATE: 60 BPM | RESPIRATION RATE: 18 BRPM | DIASTOLIC BLOOD PRESSURE: 77 MMHG | OXYGEN SATURATION: 97 %

## 2020-06-29 DIAGNOSIS — M48.061 SPINAL STENOSIS OF LUMBAR REGION WITHOUT NEUROGENIC CLAUDICATION: ICD-10-CM

## 2020-06-29 PROCEDURE — 64483 NJX AA&/STRD TFRM EPI L/S 1: CPT | Performed by: PHYSICAL MEDICINE & REHABILITATION

## 2020-06-29 RX ORDER — PAPAVERINE HCL 150 MG
20 CAPSULE, EXTENDED RELEASE ORAL ONCE
Status: COMPLETED | OUTPATIENT
Start: 2020-06-29 | End: 2020-06-29

## 2020-06-29 RX ORDER — LIDOCAINE HYDROCHLORIDE 10 MG/ML
5 INJECTION, SOLUTION EPIDURAL; INFILTRATION; INTRACAUDAL; PERINEURAL ONCE
Status: COMPLETED | OUTPATIENT
Start: 2020-06-29 | End: 2020-06-29

## 2020-06-29 RX ADMIN — DEXAMETHASONE SODIUM PHOSPHATE 15 MG: 10 INJECTION, SOLUTION INTRAMUSCULAR; INTRAVENOUS at 13:21

## 2020-06-29 RX ADMIN — LIDOCAINE HYDROCHLORIDE 3 ML: 10 INJECTION, SOLUTION EPIDURAL; INFILTRATION; INTRACAUDAL; PERINEURAL at 13:19

## 2020-06-29 RX ADMIN — IOHEXOL 3 ML: 300 INJECTION, SOLUTION INTRAVENOUS at 13:20

## 2020-06-29 NOTE — DISCHARGE INSTRUCTIONS
Epidural Steroid Injection   WHAT YOU NEED TO KNOW:   An epidural steroid injection (DANIE) is a procedure to inject steroid medicine into the epidural space  The epidural space is between your spinal cord and vertebrae  Steroids reduce inflammation and fluid buildup in your spine that may be causing pain  You may be given pain medicine along with the steroids  ACTIVITY  · Do not drive or operate machinery today  · No strenuous activity today - bending, lifting, etc   · You may resume normal activites starting tomorrow - start slowly and as tolerated  · You may shower today, but no tub baths or hot tubs  · You may have numbness for several hours from the local anesthetic  Please use caution and common sense, especially with weight-bearing activities  CARE OF THE INJECTION SITE  · If you have soreness or pain, apply ice to the area today (20 minutes on/20 minutes off)  · Starting tomorrow, you may use warm, moist heat or ice if needed  · You may have an increase or change in your discomfort for 36-48 hours after your treatment  · Apply ice and continue with any pain medication you have been prescribed  · Notify [facility] if you have any of the following: redness, drainage, swelling, headache, stiff neck or fever above 100°F     SPECIAL INSTRUCTIONS  · Our office will contact you in approximately 7 days for a progress report  MEDICATIONS  · Continue to take all routine medications  · Our office may have instructed you to hold some medications  If you have a problem specifically related to your procedure, please call our office at [phone]  Problems not related to your procedure should be directed to your primary care physician

## 2020-06-29 NOTE — H&P
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)    Left thigh pain    Cough    Radiculopathy, lumbar region       Past Medical History:   Diagnosis Date    Abnormal blood chemistry     last assessed: 5/1/2013    Abnormal blood sugar     last assessed: 12/18/2014    Allergic rhinitis     last assessed: 11/8/2013    Cancer ([facility] )     Diabetes mellitus ([facility] )     last assessed: 4/30/2013    Low back pain     Nodule of finger of right hand     last assessed: 10/19/2015    Obstruction of right eustachian tube     last assessed: 10/11/2016       Past Surgical History:   Procedure Laterality Date    CATARACT EXTRACTION, BILATERAL      INGUINAL HERNIA REPAIR           Current Outpatient Medications:     aspirin 81 mg chewable tablet, Chew 81 mg daily, Disp: , Rfl:     cholecalciferol (VITAMIN D3) 1,000 units tablet, Take 1 tablet by mouth daily, Disp: , Rfl:     clobetasol (TEMOVATE) 0 05 % ointment, APPLY TWICE A DAY FOR 2 WEEKS, THEN AS NEEDED FLARES, Disp: , Rfl: 1    diclofenac (VOLTAREN) 75 mg EC tablet, Take 1 tablet (75 mg total) by mouth 2 (two) times a day (Patient not taking: Reported on 6/12/2020), Disp: 30 tablet, Rfl: 1    glucosamine-chondroitin (COSAMIN DS) 500-400 MG tablet, Take 1 tablet by mouth 3 (three) times a day, Disp: , Rfl:     Ivermectin (SOOLANTRA) 1 % CREA, Apply topically daily as needed, Disp: , Rfl:     montelukast (SINGULAIR) 10 mg tablet, TAKE 1 TABLET BY MOUTH DAILY AT BEDTIME, Disp: 90 tablet, Rfl: 0    Multiple Vitamins-Minerals (CENTRUM SILVER 50+MEN PO), Take 1 tablet by mouth daily  , Disp: , Rfl:     oxyCODONE-acetaminophen (PERCOCET) 5-325 mg per tablet, Take 1 tablet by mouth every 4 (four) hours as needed for moderate painMax Daily Amount: 6 tablets (Patient not taking: Reported on 5/18/2020), Disp: 20 tablet, Rfl: 0    patient supplied medication, Apply topically 3 (three) times a week Carmustine 0 04%/ 100gm, Disp: , Rfl:     quinapril (ACCUPRIL) 20 mg tablet, TAKE 1 TABLET DAILY AS NEEDED, Disp: 90 tablet, Rfl: 3    tamsulosin (FLOMAX) 0 4 mg, Take 1 capsule (0 4 mg total) by mouth daily with dinner, Disp: 90 capsule, Rfl: 3    Current Facility-Administered Medications:     dexamethasone (PF) (DECADRON) injection 20 mg, 20 mg, Epidural, Once, Willistine Lowe, DO    iohexol (OMNIPAQUE) 300 mg/mL injection 50 mL, 50 mL, Epidural, Once, Willistine Lowe, DO    lidocaine (PF) (XYLOCAINE-MPF) 1 % injection 5 mL, 5 mL, Infiltration, Once, Willistine Lowe, DO    Allergies   Allergen Reactions    Amoxicillin Diarrhea       Physical Exam:   Vitals:    06/29/20 1259   BP: 109/74   Pulse: 69   Resp: 18   Temp: 98 4 °F (36 9 °C)   SpO2: 96%     General: Awake, Alert, Oriented x 3, Mood and affect appropriate  Respiratory: Respirations even and unlabored  Cardiovascular: Peripheral pulses intact; no edema  Musculoskeletal Exam:  Bilateral back and leg pain    ASA Score:  2    Patient/Chart Verification  Patient ID Verified: Verbal  Consents Confirmed: Procedural, To be obtained in the Pre-Procedure area  H&P( within 30 days) Verified: To be obtained in the Pre-Procedure area  Allergies Reviewed: Yes  Anticoag/NSAID held?: NA  Currently on antibiotics?: No  Pregnancy denied?: NA    Assessment:   1   Spinal stenosis of lumbar region without neurogenic claudication        Plan: (B) L4-5 TFESI

## 2020-07-06 ENCOUNTER — TELEPHONE (OUTPATIENT)
Dept: PAIN MEDICINE | Facility: CLINIC | Age: 75
End: 2020-07-06

## 2020-07-14 ENCOUNTER — APPOINTMENT (OUTPATIENT)
Dept: LAB | Facility: CLINIC | Age: 75
End: 2020-07-14
Payer: MEDICARE

## 2020-07-29 ENCOUNTER — APPOINTMENT (OUTPATIENT)
Dept: LAB | Facility: CLINIC | Age: 75
End: 2020-07-29
Payer: MEDICARE

## 2020-07-29 ENCOUNTER — OFFICE VISIT (OUTPATIENT)
Dept: FAMILY MEDICINE CLINIC | Facility: CLINIC | Age: 75
End: 2020-07-29
Payer: MEDICARE

## 2020-07-29 VITALS
HEIGHT: 73 IN | OXYGEN SATURATION: 97 % | DIASTOLIC BLOOD PRESSURE: 74 MMHG | TEMPERATURE: 97.6 F | BODY MASS INDEX: 29.03 KG/M2 | SYSTOLIC BLOOD PRESSURE: 120 MMHG | RESPIRATION RATE: 16 BRPM | WEIGHT: 219 LBS | HEART RATE: 63 BPM

## 2020-07-29 DIAGNOSIS — R73.09 ABNORMAL BLOOD SUGAR: ICD-10-CM

## 2020-07-29 DIAGNOSIS — N40.1 BENIGN PROSTATIC HYPERPLASIA WITH URINARY HESITANCY: ICD-10-CM

## 2020-07-29 DIAGNOSIS — R39.11 BENIGN PROSTATIC HYPERPLASIA WITH URINARY HESITANCY: ICD-10-CM

## 2020-07-29 DIAGNOSIS — C85.80: ICD-10-CM

## 2020-07-29 DIAGNOSIS — M48.061 SPINAL STENOSIS OF LUMBAR REGION WITHOUT NEUROGENIC CLAUDICATION: Primary | ICD-10-CM

## 2020-07-29 DIAGNOSIS — M19.90 ARTHRITIS: ICD-10-CM

## 2020-07-29 DIAGNOSIS — M54.16 RADICULOPATHY, LUMBAR REGION: ICD-10-CM

## 2020-07-29 DIAGNOSIS — I10 BENIGN ESSENTIAL HYPERTENSION: Primary | ICD-10-CM

## 2020-07-29 PROCEDURE — 3008F BODY MASS INDEX DOCD: CPT | Performed by: FAMILY MEDICINE

## 2020-07-29 PROCEDURE — 3078F DIAST BP <80 MM HG: CPT | Performed by: FAMILY MEDICINE

## 2020-07-29 PROCEDURE — 3074F SYST BP LT 130 MM HG: CPT | Performed by: FAMILY MEDICINE

## 2020-07-29 PROCEDURE — 36415 COLL VENOUS BLD VENIPUNCTURE: CPT

## 2020-07-29 PROCEDURE — 1036F TOBACCO NON-USER: CPT | Performed by: FAMILY MEDICINE

## 2020-07-29 PROCEDURE — 86618 LYME DISEASE ANTIBODY: CPT

## 2020-07-29 PROCEDURE — 1160F RVW MEDS BY RX/DR IN RCRD: CPT | Performed by: FAMILY MEDICINE

## 2020-07-29 PROCEDURE — 99214 OFFICE O/P EST MOD 30 MIN: CPT | Performed by: FAMILY MEDICINE

## 2020-07-29 PROCEDURE — 4040F PNEUMOC VAC/ADMIN/RCVD: CPT | Performed by: FAMILY MEDICINE

## 2020-07-29 RX ORDER — CELECOXIB 100 MG/1
CAPSULE ORAL
Qty: 30 CAPSULE | Refills: 0 | Status: SHIPPED | OUTPATIENT
Start: 2020-07-29 | End: 2020-09-10

## 2020-07-29 NOTE — PROGRESS NOTES
[facility]      NAME: Princess Yuan  AGE: 67 y o  SEX: male  : 1945   MRN: [id number]    DATE: 2020  TIME: 1:34 PM    Assessment and Plan     Problem List Items Addressed This Visit     Benign essential hypertension - Primary      Controlled on quinapril 20 mg daily         Abnormal blood sugar      Blood sugar 98, A1c 5 8%  Continue reduced carb diet exercise  Will continue to monitor yearly         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[facility])      Has been stable  Still being followed by dermatologist at [address] [place] [address] and also local dermatologist (Dr Vicente Ledezma)         Arthritis       Patient with ongoing symptoms of arthritis and multiple joints  He also is having chronic low back pain  Will give trial of Celebrex 100 mg daily p r n  I also gave him a lab slip for Lyme titer to get done         Relevant Medications    celecoxib (CeleBREX) 100 mg capsule    Other Relevant Orders    Lyme Antibody Profile with reflex to WB    Benign prostatic hyperplasia with urinary hesitancy      Doing well on tamsulosin 0 4 mg nightly         Radiculopathy, lumbar region    Relevant Medications    celecoxib (CeleBREX) 100 mg capsule              Return to office in:  6 months, AWV  Yearly labs due 2021    Chief Complaint     Chief Complaint   Patient presents with    Follow-up     6 months check up       History of Present Illness      Patient presents for recheck chronic medical problems today  Still having ongoing problems with chronic low back pain  He has been seen by pain management  Doing well on quinapril for blood pressure  Doing well on tamsulosin for BPH  Still sees dermatologist regularly at [facility]    Patient had labs drawn on       The following portions of the patient's history were reviewed and updated as appropriate: allergies, current medications, past family history, past medical history, past social history, past surgical history and problem list     Review of Systems   Review of Systems   Respiratory: Negative  Cardiovascular: Negative  Gastrointestinal: Negative  Genitourinary: Negative  Active Problem List     Patient Active Problem List   Diagnosis    Benign essential hypertension    Abnormal blood sugar    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(HCC)    Arthritis    Benign prostatic hyperplasia with urinary hesitancy    Cervical strain    Lateral epicondylitis of right elbow    Impaired glucose tolerance    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)    Left thigh pain    Cough    Radiculopathy, lumbar region    Spinal stenosis of lumbar region without neurogenic claudication       Objective   /74 (BP Location: Left arm, Patient Position: Sitting, Cuff Size: Large)   Pulse 63   Temp 97 6 °F (36 4 °C) (Tympanic)   Resp 16   Ht 6' 0 84" (1 85 m)   Wt 99 3 kg (219 lb)   SpO2 97%   BMI 29 02 kg/m²     Physical Exam   Cardiovascular: Normal rate, regular rhythm, normal heart sounds and intact distal pulses  Carotids: no bruits  Ext: no edema   Pulmonary/Chest: Effort normal  No respiratory distress  He has no wheezes  He has no rales  Psychiatric: He has a normal mood and affect   His behavior is normal  Thought content normal        Pertinent Laboratory/Diagnostic Studies:  Labs July 14th    Current Medications     Current Outpatient Medications:     aspirin 81 mg chewable tablet, Chew 81 mg daily, Disp: , Rfl:     cholecalciferol (VITAMIN D3) 1,000 units tablet, Take 1 tablet by mouth daily, Disp: , Rfl:     clobetasol (TEMOVATE) 0 05 % ointment, APPLY TWICE A DAY FOR 2 WEEKS, THEN AS NEEDED FLARES, Disp: , Rfl: 1    montelukast (SINGULAIR) 10 mg tablet, TAKE 1 TABLET BY MOUTH DAILY AT BEDTIME, Disp: 90 tablet, Rfl: 0    Multiple Vitamins-Minerals (CENTRUM SILVER 50+MEN PO), Take 1 tablet by mouth daily  , Disp: , Rfl:     quinapril (ACCUPRIL) 20 mg tablet, TAKE 1 TABLET DAILY AS NEEDED, Disp: 90 tablet, Rfl: 3    tamsulosin (FLOMAX) 0 4 mg, Take 1 capsule (0 4 mg total) by mouth daily with dinner, Disp: 90 capsule, Rfl: 3    celecoxib (CeleBREX) 100 mg capsule, 1 tab qd prn, Disp: 30 capsule, Rfl: 0    glucosamine-chondroitin (COSAMIN DS) 500-400 MG tablet, Take 1 tablet by mouth 3 (three) times a day, Disp: , Rfl:     Ivermectin (SOOLANTRA) 1 % CREA, Apply topically daily as needed, Disp: , Rfl:     oxyCODONE-acetaminophen (PERCOCET) 5-325 mg per tablet, Take 1 tablet by mouth every 4 (four) hours as needed for moderate painMax Daily Amount: 6 tablets (Patient not taking: Reported on 5/18/2020), Disp: 20 tablet, Rfl: 0    patient supplied medication, Apply topically 3 (three) times a week Carmustine 0 04%/ 100gm, Disp: , Rfl:     Health Maintenance     Health Maintenance   Topic Date Due    Influenza Vaccine  07/01/2020    Depression Screening PHQ  01/29/2021    BMI: Followup Plan  01/29/2021    DTaP,Tdap,and Td Vaccines (2 - Td) 09/01/2020    Fall Risk  01/29/2021    Medicare Annual Wellness Visit (AWV)  01/29/2021    BMI: Adult  07/29/2021    CRC Screening: Colonoscopy  12/29/2027    Pneumococcal Vaccine: 65+ Years  Completed    Pneumococcal Vaccine: Pediatrics (0 to 5 Years) and At-Risk Patients (6 to 59 Years)  Aged Out    HIB Vaccine  Aged Out    Hepatitis B Vaccine  Aged Out    IPV Vaccine  Aged Out    Hepatitis A Vaccine  Aged Out    Meningococcal ACWY Vaccine  Aged Out    HPV Vaccine  Aged [employer] History   Administered Date(s) Administered    INFLUENZA 09/22/2015, 09/19/2016, 09/15/2018    Influenza Split High Dose Preservative Free IM 10/10/2014, 09/22/2015, 09/19/2016, 08/31/2017    Influenza TIV (IM) 09/14/2012    Influenza, high dose seasonal 0 5 mL 09/26/2019    Pneumococcal Conjugate 13-Valent 07/09/2015    Pneumococcal Polysaccharide PPV23 11/01/2006, 01/29/2020    Tdap 09/01/2010    Zoster 07/18/2008    Zoster Vaccine Recombinant 04/15/2019, 06/17/2019       Amy Townsend [facility]

## 2020-07-29 NOTE — ASSESSMENT & PLAN NOTE
Patient with ongoing symptoms of arthritis and multiple joints  He also is having chronic low back pain    Will give trial of Celebrex 100 mg daily p r n  I also gave him a lab slip for Lyme titer to get done

## 2020-07-29 NOTE — ASSESSMENT & PLAN NOTE
Has been stable    Still being followed by dermatologist at [facility] and also local dermatologist (Dr Carline Carmona)

## 2020-07-30 LAB — B BURGDOR IGG+IGM SER-ACNC: <0.91 ISR (ref 0–0.9)

## 2020-07-31 ENCOUNTER — OFFICE VISIT (OUTPATIENT)
Dept: PAIN MEDICINE | Facility: MEDICAL CENTER | Age: 75
End: 2020-07-31
Payer: MEDICARE

## 2020-07-31 VITALS
BODY MASS INDEX: 29.66 KG/M2 | WEIGHT: 219 LBS | SYSTOLIC BLOOD PRESSURE: 120 MMHG | DIASTOLIC BLOOD PRESSURE: 72 MMHG | HEIGHT: 72 IN | HEART RATE: 69 BPM | TEMPERATURE: 97.8 F

## 2020-07-31 DIAGNOSIS — M48.061 SPINAL STENOSIS OF LUMBAR REGION WITHOUT NEUROGENIC CLAUDICATION: ICD-10-CM

## 2020-07-31 DIAGNOSIS — G89.4 CHRONIC PAIN SYNDROME: Primary | ICD-10-CM

## 2020-07-31 DIAGNOSIS — M54.50 CHRONIC BILATERAL LOW BACK PAIN WITHOUT SCIATICA: ICD-10-CM

## 2020-07-31 DIAGNOSIS — M54.16 RADICULOPATHY, LUMBAR REGION: ICD-10-CM

## 2020-07-31 DIAGNOSIS — G89.29 CHRONIC BILATERAL LOW BACK PAIN WITHOUT SCIATICA: ICD-10-CM

## 2020-07-31 PROCEDURE — 1160F RVW MEDS BY RX/DR IN RCRD: CPT | Performed by: NURSE PRACTITIONER

## 2020-07-31 PROCEDURE — 3074F SYST BP LT 130 MM HG: CPT | Performed by: NURSE PRACTITIONER

## 2020-07-31 PROCEDURE — 3008F BODY MASS INDEX DOCD: CPT | Performed by: NURSE PRACTITIONER

## 2020-07-31 PROCEDURE — 99213 OFFICE O/P EST LOW 20 MIN: CPT | Performed by: NURSE PRACTITIONER

## 2020-07-31 PROCEDURE — 4040F PNEUMOC VAC/ADMIN/RCVD: CPT | Performed by: NURSE PRACTITIONER

## 2020-07-31 PROCEDURE — 3078F DIAST BP <80 MM HG: CPT | Performed by: NURSE PRACTITIONER

## 2020-07-31 PROCEDURE — 1036F TOBACCO NON-USER: CPT | Performed by: NURSE PRACTITIONER

## 2020-07-31 NOTE — PROGRESS NOTES
Assessment  1  Chronic pain syndrome    2  Chronic bilateral low back pain without sciatica    3  Radiculopathy, lumbar region    4  Spinal stenosis of lumbar region without neurogenic claudication        Plan   While the patient was in the office today, I did have a thorough conversation regarding their chronic pain syndrome, medication management, and treatment plan options  Patient is a 50-year-old male with chronic pain syndrome related to chronic low back pain, lumbar disc herniation, lumbar spinal stenosis  He recently underwent bilateral L4-5 transforaminal epidural steroid injection on 06/29/2020  He tells me that initially, his pain was 100% improved  He is still maintaining about 50% improvement  Compared to before the injection  At this point, we will plan to repeat the bilateral L4-5 transforaminal epidural steroid injection  He has a prescription for gabapentin at home which she has not started yet, I reviewed the mechanism of gabapentin and side effects of it  He tells me that he is going to try gabapentin  Complete risks and benefits including bleeding, infection, tissue reaction, nerve injury and allergic reaction were discussed  The approach was demonstrated using models and literature was provided  Verbal and written consent was obtained  My impressions and treatment recommendations were discussed in detail with the patient who verbalized understanding and had no further questions  Discharge instructions were provided  I personally saw and examined the patient and I agree with the above discussed plan of care  Orders Placed This Encounter   Procedures    FL spine and pain procedure     Standing Status:   Future     Standing Expiration Date:   7/31/2024     Order Specific Question:   Reason for Exam:     Answer:   B/L L4-L5 TFESI     Order Specific Question:   Anticoagulant hold needed?      Answer:   baby ASA     No orders of the defined types were placed in this encounter  History of Present Illness    Princess Yuan is a 67 y o  male   Complaining of low back pain and pain in bilateral anterior thighs, right greater than left  He underwent bilateral L4-5 transforaminal epidural steroid injection on 06/29/2020 and tells me that initially his pain was 100% improved  He still maintaining about 50% improvement  His pain is no longer constant  Pain is always worse in the morning and with standing and walking  Current pain level is a 0/10  Pain is described as burning sharp and shooting  I have personally reviewed and/or updated the patient's past medical history, past surgical history, family history, social history, current medications, allergies, and vital signs today  Review of Systems   Respiratory: Negative for shortness of breath  Cardiovascular: Negative for chest pain  Gastrointestinal: Negative for constipation, diarrhea, nausea and vomiting  Musculoskeletal: Positive for back pain (radiates RT leg) and gait problem  Negative for arthralgias, joint swelling and myalgias  Skin: Negative for rash  Neurological: Negative for dizziness, seizures and weakness  All other systems reviewed and are negative        Patient Active Problem List   Diagnosis    Benign essential hypertension    Abnormal blood sugar    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(HCC)    Arthritis    Benign prostatic hyperplasia with urinary hesitancy    Cervical strain    Lateral epicondylitis of right elbow    Impaired glucose tolerance    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)    Left thigh pain    Cough    Radiculopathy, lumbar region    Spinal stenosis of lumbar region without neurogenic claudication    Chronic bilateral low back pain without sciatica    Chronic pain syndrome       Past Medical History:   Diagnosis Date    Abnormal blood chemistry     last assessed: 5/1/2013    Abnormal blood sugar     last assessed: 12/18/2014    Allergic rhinitis last assessed: 11/8/2013    [facility] ([facility] [address] [facility]     [facility] ([email or web address] )     last assessed: 4/30/2013    Low back pain     Nodule of finger of right hand     last assessed: 10/19/2015    Obstruction of right eustachian tube     last assessed: 10/11/2016       Past Surgical History:   Procedure Laterality Date    CATARACT EXTRACTION, BILATERAL      INGUINAL HERNIA REPAIR         Family History   Problem Relation Age of Onset    No Known Problems Mother     No Known Problems Father        Social History     Occupational History    Not on file   Tobacco Use    Smoking status: Never Smoker    Smokeless tobacco: Never Used   Substance and Sexual Activity    Alcohol use: Not on file    Drug use: No    Sexual activity: Not on file       Current Outpatient Medications on File Prior to Visit   Medication Sig    aspirin 81 mg chewable tablet Chew 81 mg daily    celecoxib (CeleBREX) 100 mg capsule 1 tab qd prn    cholecalciferol (VITAMIN D3) 1,000 units tablet Take 1 tablet by mouth daily    clobetasol (TEMOVATE) 0 05 % ointment APPLY TWICE A DAY FOR 2 WEEKS, THEN AS NEEDED FLARES    glucosamine-chondroitin (COSAMIN DS) 500-400 MG tablet Take 1 tablet by mouth 3 (three) times a day    Ivermectin (SOOLANTRA) 1 % CREA Apply topically daily as needed    montelukast (SINGULAIR) 10 mg tablet TAKE 1 TABLET BY MOUTH DAILY AT BEDTIME    Multiple Vitamins-Minerals (CENTRUM SILVER 50+MEN PO) Take 1 tablet by mouth daily      patient supplied medication Apply topically 3 (three) times a week Carmustine 0 04%/ 100gm    quinapril (ACCUPRIL) 20 mg tablet TAKE 1 TABLET DAILY AS NEEDED    tamsulosin (FLOMAX) 0 4 mg Take 1 capsule (0 4 mg total) by mouth daily with dinner    oxyCODONE-acetaminophen (PERCOCET) 5-325 mg per tablet Take 1 tablet by mouth every 4 (four) hours as needed for moderate painMax Daily Amount: 6 tablets (Patient not taking: Reported on 7/31/2020)     No current facility-administered medications on file prior to visit  Allergies   Allergen Reactions    Amoxicillin Diarrhea       Physical Exam    /72   Pulse 69   Temp 97 8 °F (36 6 °C) (Temporal)   Ht 6' (1 829 m)   Wt 99 3 kg (219 lb)   BMI 29 70 kg/m²     Constitutional: normal, well developed, well nourished, alert, in no distress and non-toxic and no overt pain behavior  Eyes: anicteric  HEENT: grossly intact  Neck: supple, symmetric, trachea midline and no masses   Pulmonary:even and unlabored  Cardiovascular:No edema or pitting edema present  Skin:Normal without rashes or lesions and well hydrated  Psychiatric:Mood and affect appropriate  Neurologic:Cranial Nerves II-XII grossly intact  Musculoskeletal:  Slow and guarded      Imaging

## 2020-08-05 ENCOUNTER — HOSPITAL ENCOUNTER (OUTPATIENT)
Dept: RADIOLOGY | Facility: MEDICAL CENTER | Age: 75
Discharge: HOME | End: 2020-08-05
Attending: PHYSICAL MEDICINE & REHABILITATION
Payer: MEDICARE

## 2020-08-05 VITALS
RESPIRATION RATE: 20 BRPM | DIASTOLIC BLOOD PRESSURE: 63 MMHG | HEART RATE: 70 BPM | SYSTOLIC BLOOD PRESSURE: 119 MMHG | OXYGEN SATURATION: 97 % | TEMPERATURE: 98 F

## 2020-08-05 DIAGNOSIS — M54.16 RADICULOPATHY, LUMBAR REGION: ICD-10-CM

## 2020-08-05 DIAGNOSIS — M48.061 SPINAL STENOSIS OF LUMBAR REGION WITHOUT NEUROGENIC CLAUDICATION: ICD-10-CM

## 2020-08-05 PROCEDURE — 64483 NJX AA&/STRD TFRM EPI L/S 1: CPT | Performed by: PHYSICAL MEDICINE & REHABILITATION

## 2020-08-05 RX ORDER — PAPAVERINE HCL 150 MG
20 CAPSULE, EXTENDED RELEASE ORAL ONCE
Status: COMPLETED | OUTPATIENT
Start: 2020-08-05 | End: 2020-08-05

## 2020-08-05 RX ORDER — LIDOCAINE HYDROCHLORIDE 10 MG/ML
5 INJECTION, SOLUTION EPIDURAL; INFILTRATION; INTRACAUDAL; PERINEURAL ONCE
Status: COMPLETED | OUTPATIENT
Start: 2020-08-05 | End: 2020-08-05

## 2020-08-05 RX ADMIN — DEXAMETHASONE SODIUM PHOSPHATE 15 MG: 10 INJECTION, SOLUTION INTRAMUSCULAR; INTRAVENOUS at 10:24

## 2020-08-05 RX ADMIN — LIDOCAINE HYDROCHLORIDE 3 ML: 10 INJECTION, SOLUTION EPIDURAL; INFILTRATION; INTRACAUDAL; PERINEURAL at 10:23

## 2020-08-05 RX ADMIN — IOHEXOL 3 ML: 300 INJECTION, SOLUTION INTRAVENOUS at 10:24

## 2020-08-05 NOTE — DISCHARGE INSTRUCTIONS
Epidural Steroid Injection   WHAT YOU NEED TO KNOW:   An epidural steroid injection (DANIE) is a procedure to inject steroid medicine into the epidural space  The epidural space is between your spinal cord and vertebrae  Steroids reduce inflammation and fluid buildup in your spine that may be causing pain  You may be given pain medicine along with the steroids  ACTIVITY  · Do not drive or operate machinery today  · No strenuous activity today - bending, lifting, etc   · You may resume normal activites starting tomorrow - start slowly and as tolerated  · You may shower today, but no tub baths or hot tubs  · You may have numbness for several hours from the local anesthetic  Please use caution and common sense, especially with weight-bearing activities  CARE OF THE INJECTION SITE  · If you have soreness or pain, apply ice to the area today (20 minutes on/20 minutes off)  · Starting tomorrow, you may use warm, moist heat or ice if needed  · You may have an increase or change in your discomfort for 36-48 hours after your treatment  · Apply ice and continue with any pain medication you have been prescribed  · [facility]ify [facility] if you have any of the following: redness, drainage, swelling, headache, stiff neck or fever above 100°F     SPECIAL INSTRUCTIONS  · Our office will contact you in approximately 7 days for a progress report  MEDICATIONS  · Continue to take all routine medications  · Our office may have instructed you to hold some medications  If you have a problem specifically related to your procedure, please call our office at [phone]  Problems not related to your procedure should be directed to your primary care physician

## 2020-08-05 NOTE — H&P
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)    Left thigh pain    Cough    Radiculopathy, lumbar region    Spinal stenosis of lumbar region without neurogenic claudication    Chronic bilateral low back pain without sciatica    Chronic pain syndrome       Past Medical History:   Diagnosis Date    Abnormal blood chemistry     last assessed: 5/1/2013    Abnormal blood sugar     last assessed: 12/18/2014    Allergic rhinitis     last assessed: 11/8/2013    Cancer ([facility] )     Diabetes mellitus ([facility] )     last assessed: 4/30/2013    Low back pain     Nodule of finger of right hand     last assessed: 10/19/2015    Obstruction of right eustachian tube     last assessed: 10/11/2016       Past Surgical History:   Procedure Laterality Date    CATARACT EXTRACTION, BILATERAL      INGUINAL HERNIA REPAIR           Current Outpatient Medications:     aspirin 81 mg chewable tablet, Chew 81 mg daily, Disp: , Rfl:     celecoxib (CeleBREX) 100 mg capsule, 1 tab qd prn, Disp: 30 capsule, Rfl: 0    cholecalciferol (VITAMIN D3) 1,000 units tablet, Take 1 tablet by mouth daily, Disp: , Rfl:     clobetasol (TEMOVATE) 0 05 % ointment, APPLY TWICE A DAY FOR 2 WEEKS, THEN AS NEEDED FLARES, Disp: , Rfl: 1    glucosamine-chondroitin (COSAMIN DS) 500-400 MG tablet, Take 1 tablet by mouth 3 (three) times a day, Disp: , Rfl:     Ivermectin (SOOLANTRA) 1 % CREA, Apply topically daily as needed, Disp: , Rfl:     montelukast (SINGULAIR) 10 mg tablet, TAKE 1 TABLET BY MOUTH DAILY AT BEDTIME, Disp: 90 tablet, Rfl: 0    Multiple Vitamins-Minerals (CENTRUM SILVER 50+MEN PO), Take 1 tablet by mouth daily  , Disp: , Rfl:     oxyCODONE-acetaminophen (PERCOCET) 5-325 mg per tablet, Take 1 tablet by mouth every 4 (four) hours as needed for moderate painMax Daily Amount: 6 tablets (Patient not taking: Reported on 7/31/2020), Disp: 20 tablet, Rfl: 0    patient supplied medication, Apply topically 3 (three) times a week Carmustine 0 04%/ 100gm, Disp: , Rfl:     quinapril (ACCUPRIL) 20 mg tablet, TAKE 1 TABLET DAILY AS NEEDED, Disp: 90 tablet, Rfl: 3    tamsulosin (FLOMAX) 0 4 mg, Take 1 capsule (0 4 mg total) by mouth daily with dinner, Disp: 90 capsule, Rfl: 3    Current Facility-Administered Medications:     dexamethasone (PF) (DECADRON) injection 20 mg, 20 mg, Epidural, Once, Saritha Jose De Jesus, DO    iohexol (OMNIPAQUE) 300 mg/mL injection 50 mL, 50 mL, Epidural, Once, Saritha Jose De Jesus, DO    lidocaine (PF) (XYLOCAINE-MPF) 1 % injection 5 mL, 5 mL, Infiltration, Once, Saritha Jose De Jesus, DO    Allergies   Allergen Reactions    Amoxicillin Diarrhea       Physical Exam:   Vitals:    08/05/20 1009   BP: 118/70   Pulse: 87   Resp: 20   Temp: 98 °F (36 7 °C)   SpO2: 96%     General: Awake, Alert, Oriented x 3, Mood and affect appropriate  Respiratory: Respirations even and unlabored  Cardiovascular: Peripheral pulses intact; no edema  Musculoskeletal Exam:  Bilateral back and leg pain    ASA Score:  2  Patient/Chart Verification  Patient ID Verified: Verbal  ID Band Applied: No  Consents Confirmed: Procedural  H&P( within 30 days) Verified: To be obtained in the Pre-Procedure area  Interval H&P(within 24 hr) Complete (required for Outpatients and Surgery Admit only): To be obtained in the Pre-Procedure area  Allergies Reviewed: Yes  Anticoag/NSAID held?: No(Takes 81 mg daily of aspirin )  Currently on antibiotics?: No    Assessment:   1  Radiculopathy, lumbar region    2   Spinal stenosis of lumbar region without neurogenic claudication        Plan: B/L L4-L5 TFESI

## 2020-08-12 ENCOUNTER — TELEPHONE (OUTPATIENT)
Dept: PAIN MEDICINE | Facility: CLINIC | Age: 75
End: 2020-08-12

## 2020-09-03 ENCOUNTER — OFFICE VISIT (OUTPATIENT)
Dept: FAMILY MEDICINE CLINIC | Facility: CLINIC | Age: 75
End: 2020-09-03
Payer: MEDICARE

## 2020-09-03 VITALS
HEIGHT: 72 IN | DIASTOLIC BLOOD PRESSURE: 80 MMHG | OXYGEN SATURATION: 96 % | RESPIRATION RATE: 16 BRPM | WEIGHT: 218 LBS | TEMPERATURE: 97.4 F | SYSTOLIC BLOOD PRESSURE: 120 MMHG | BODY MASS INDEX: 29.53 KG/M2 | HEART RATE: 71 BPM

## 2020-09-03 DIAGNOSIS — Z01.810 PREOP CARDIOVASCULAR EXAM: Primary | ICD-10-CM

## 2020-09-03 DIAGNOSIS — M51.16 LUMBAR DISC HERNIATION WITH RADICULOPATHY: ICD-10-CM

## 2020-09-03 PROCEDURE — 93000 ELECTROCARDIOGRAM COMPLETE: CPT | Performed by: FAMILY MEDICINE

## 2020-09-03 PROCEDURE — 99214 OFFICE O/P EST MOD 30 MIN: CPT | Performed by: FAMILY MEDICINE

## 2020-09-03 NOTE — PROGRESS NOTES
Assessment/Plan:   1  Preop cardiovascular exam/Lumbar disc herniation with radiculopathy  Patient appears well today  He has a good functional capacity and no active cardiac problems  His EKG today appear to show normal sinus rhythm, no ST or T-wave changes  His temp for seizures consider intermediate risk  Patient is cleared with intermediate perioperative cardiovascular risk  No further testing is needed today he was advised to stop his aspirin this coming Monday 9/7   - POCT ECG             Diagnoses and all orders for this visit:    Preop cardiovascular exam  -     POCT ECG    Lumbar disc herniation with radiculopathy  -     POCT ECG          Subjective:       Chief Complaint   Patient presents with    Pre-op Exam     9/14 d ecompression L3/4  L4/5      Patient ID: Carmelo Dudley is a 67 y o  male  Carmelo Dudley  67 y o   male    SURGEON:Dr Loree Davis    SURGERY/PROCEDURE: LUMBAR SPINAL STENOSIS DECOMPRESSION L3-4, L4-5 VIA LEFT SIDE APPROACH     DATE OF SURGERY: 9/14/20    PRIOR ANESTHESIA:no    COMPLICATION: no    BLEEDING PROBLEM: no    PERTINENT PMH: no    EXERCISE CAPACITY:   CAN WALK 4 BLOCKS AND OR CLIMB 2 FLIGHTS: Yes    HOME LIVING SITUATION SAFE AND SECURE: Yes      TOBACCO: no     ETOH: no     ILLEGAL DRUGS: no        Review of Systems   Constitutional: Negative for activity change, chills, fatigue and fever  HENT: Negative for congestion, ear pain, sinus pressure and sore throat  Eyes: Negative for redness, itching and visual disturbance  Respiratory: Negative for cough and shortness of breath  Cardiovascular: Negative for chest pain and palpitations  Gastrointestinal: Negative for abdominal pain, diarrhea and nausea  Endocrine: Negative for cold intolerance and heat intolerance  Genitourinary: Negative for dysuria, flank pain and frequency  Musculoskeletal: Negative for arthralgias, back pain, gait problem and myalgias  Skin: Negative for color change  Allergic/Immunologic: Negative for environmental allergies  Neurological: Negative for dizziness, numbness and headaches  Psychiatric/Behavioral: Negative for behavioral problems and sleep disturbance  The following portions of the patient's history were reviewed and updated as appropriate : past family history, past medical history, past social history and past surgical history      Current Outpatient Medications:     aspirin 81 mg chewable tablet, Chew 81 mg daily, Disp: , Rfl:     celecoxib (CeleBREX) 100 mg capsule, 1 tab qd prn, Disp: 30 capsule, Rfl: 0    cholecalciferol (VITAMIN D3) 1,000 units tablet, Take 1 tablet by mouth daily, Disp: , Rfl:     clobetasol (TEMOVATE) 0 05 % ointment, APPLY TWICE A DAY FOR 2 WEEKS, THEN AS NEEDED FLARES, Disp: , Rfl: 1    glucosamine-chondroitin (COSAMIN DS) 500-400 MG tablet, Take 1 tablet by mouth 3 (three) times a day, Disp: , Rfl:     Ivermectin (SOOLANTRA) 1 % CREA, Apply topically daily as needed, Disp: , Rfl:     montelukast (SINGULAIR) 10 mg tablet, TAKE 1 TABLET BY MOUTH DAILY AT BEDTIME, Disp: 90 tablet, Rfl: 0    Multiple Vitamins-Minerals (CENTRUM SILVER 50+MEN PO), Take 1 tablet by mouth daily  , Disp: , Rfl:     patient supplied medication, Apply topically 3 (three) times a week Carmustine 0 04%/ 100gm, Disp: , Rfl:     quinapril (ACCUPRIL) 20 mg tablet, TAKE 1 TABLET DAILY AS NEEDED, Disp: 90 tablet, Rfl: 3    tamsulosin (FLOMAX) 0 4 mg, Take 1 capsule (0 4 mg total) by mouth daily with dinner, Disp: 90 capsule, Rfl: 3    oxyCODONE-acetaminophen (PERCOCET) 5-325 mg per tablet, Take 1 tablet by mouth every 4 (four) hours as needed for moderate painMax Daily Amount: 6 tablets (Patient not taking: Reported on 7/31/2020), Disp: 20 tablet, Rfl: 0         Objective:         Vitals:    09/03/20 1022   BP: 120/80   BP Location: Right arm   Patient Position: Sitting   Cuff Size: Adult   Pulse: 71   Resp: 16   Temp: (!) 97 4 °F (36 3 °C) TempSrc: Temporal   SpO2: 96%   Weight: 98 9 kg (218 lb)   Height: 6' (1 829 m)     Physical Exam  Vitals signs reviewed  Constitutional:       Appearance: He is well-developed  HENT:      Head: Normocephalic and atraumatic  Nose: Nose normal       Mouth/Throat:      Pharynx: No oropharyngeal exudate  Eyes:      General: No scleral icterus  Right eye: No discharge  Left eye: No discharge  Pupils: Pupils are equal, round, and reactive to light  Neck:      Musculoskeletal: Normal range of motion and neck supple  Trachea: No tracheal deviation  Cardiovascular:      Rate and Rhythm: Normal rate and regular rhythm  Pulses:           Dorsalis pedis pulses are 2+ on the right side and 2+ on the left side  Posterior tibial pulses are 2+ on the right side and 2+ on the left side  Heart sounds: Normal heart sounds  No murmur  No friction rub  No gallop  Pulmonary:      Effort: Pulmonary effort is normal  No respiratory distress  Breath sounds: Normal breath sounds  No wheezing or rales  Abdominal:      General: Bowel sounds are normal  There is no distension  Palpations: Abdomen is soft  Tenderness: There is no abdominal tenderness  There is no guarding or rebound  Musculoskeletal: Normal range of motion  Lymphadenopathy:      Head:      Right side of head: No submental or submandibular adenopathy  Left side of head: No submental or submandibular adenopathy  Cervical: No cervical adenopathy  Right cervical: No superficial, deep or posterior cervical adenopathy  Left cervical: No superficial, deep or posterior cervical adenopathy  Skin:     General: Skin is warm and dry  Findings: No erythema  Neurological:      Mental Status: He is alert and oriented to person, place, and time  Cranial Nerves: No cranial nerve deficit  Sensory: No sensory deficit     Psychiatric:         Mood and Affect: Mood is not anxious or depressed  Speech: Speech normal          Behavior: Behavior normal          Thought Content:  Thought content normal          Judgment: Judgment normal

## 2020-09-10 DIAGNOSIS — I10 ESSENTIAL HYPERTENSION: ICD-10-CM

## 2020-09-10 DIAGNOSIS — M19.90 ARTHRITIS: ICD-10-CM

## 2020-09-10 DIAGNOSIS — M54.16 RADICULOPATHY, LUMBAR REGION: ICD-10-CM

## 2020-09-10 DIAGNOSIS — J30.1 ALLERGIC RHINITIS DUE TO POLLEN, UNSPECIFIED SEASONALITY: ICD-10-CM

## 2020-09-10 RX ORDER — CELECOXIB 100 MG/1
CAPSULE ORAL
Qty: 30 CAPSULE | Refills: 0 | Status: SHIPPED | OUTPATIENT
Start: 2020-09-10 | End: 2021-01-29

## 2020-09-10 RX ORDER — QUINAPRIL 20 MG/1
TABLET ORAL
Qty: 90 TABLET | Refills: 3 | Status: SHIPPED | OUTPATIENT
Start: 2020-09-10 | End: 2021-09-23

## 2020-09-10 RX ORDER — MONTELUKAST SODIUM 10 MG/1
10 TABLET ORAL
Qty: 90 TABLET | Refills: 0 | Status: SHIPPED | OUTPATIENT
Start: 2020-09-10 | End: 2020-12-02 | Stop reason: SDUPTHER

## 2020-10-14 ENCOUNTER — IMMUNIZATIONS (OUTPATIENT)
Dept: FAMILY MEDICINE CLINIC | Facility: CLINIC | Age: 75
End: 2020-10-14
Payer: MEDICARE

## 2020-10-14 DIAGNOSIS — Z23 NEED FOR VACCINATION: Primary | ICD-10-CM

## 2020-10-14 PROCEDURE — G0008 ADMIN INFLUENZA VIRUS VAC: HCPCS | Performed by: FAMILY MEDICINE

## 2020-10-14 PROCEDURE — 90662 IIV NO PRSV INCREASED AG IM: CPT | Performed by: FAMILY MEDICINE

## 2020-12-02 DIAGNOSIS — J30.1 ALLERGIC RHINITIS DUE TO POLLEN, UNSPECIFIED SEASONALITY: ICD-10-CM

## 2020-12-02 RX ORDER — MONTELUKAST SODIUM 10 MG/1
10 TABLET ORAL
Qty: 90 TABLET | Refills: 0 | Status: SHIPPED | OUTPATIENT
Start: 2020-12-02 | End: 2021-01-29 | Stop reason: SDUPTHER

## 2021-01-29 ENCOUNTER — OFFICE VISIT (OUTPATIENT)
Dept: FAMILY MEDICINE CLINIC | Facility: CLINIC | Age: 76
End: 2021-01-29
Payer: MEDICARE

## 2021-01-29 VITALS
TEMPERATURE: 97.7 F | HEART RATE: 86 BPM | RESPIRATION RATE: 16 BRPM | HEIGHT: 72 IN | WEIGHT: 225 LBS | OXYGEN SATURATION: 97 % | BODY MASS INDEX: 30.48 KG/M2 | DIASTOLIC BLOOD PRESSURE: 70 MMHG | SYSTOLIC BLOOD PRESSURE: 110 MMHG

## 2021-01-29 DIAGNOSIS — C85.80: ICD-10-CM

## 2021-01-29 DIAGNOSIS — M48.061 SPINAL STENOSIS OF LUMBAR REGION WITHOUT NEUROGENIC CLAUDICATION: ICD-10-CM

## 2021-01-29 DIAGNOSIS — Z13.220 SCREENING CHOLESTEROL LEVEL: ICD-10-CM

## 2021-01-29 DIAGNOSIS — N40.1 BENIGN PROSTATIC HYPERPLASIA WITH URINARY HESITANCY: ICD-10-CM

## 2021-01-29 DIAGNOSIS — Z12.5 SCREENING FOR PROSTATE CANCER: ICD-10-CM

## 2021-01-29 DIAGNOSIS — R73.09 ABNORMAL BLOOD SUGAR: Primary | ICD-10-CM

## 2021-01-29 DIAGNOSIS — R39.11 BENIGN PROSTATIC HYPERPLASIA WITH URINARY HESITANCY: ICD-10-CM

## 2021-01-29 DIAGNOSIS — M19.90 ARTHRITIS: ICD-10-CM

## 2021-01-29 DIAGNOSIS — I10 BENIGN ESSENTIAL HYPERTENSION: ICD-10-CM

## 2021-01-29 DIAGNOSIS — J30.1 ALLERGIC RHINITIS DUE TO POLLEN, UNSPECIFIED SEASONALITY: ICD-10-CM

## 2021-01-29 PROCEDURE — 99214 OFFICE O/P EST MOD 30 MIN: CPT | Performed by: FAMILY MEDICINE

## 2021-01-29 RX ORDER — MONTELUKAST SODIUM 10 MG/1
10 TABLET ORAL
Qty: 90 TABLET | Refills: 0
Start: 2021-01-29 | End: 2021-02-25

## 2021-01-29 NOTE — ASSESSMENT & PLAN NOTE
Symptoms much improved since recent decompression surgery on 9/11/2020 by Dr Kari Oneal    Patient has been able to discontinue gabapentin and Celebrex

## 2021-01-29 NOTE — PROGRESS NOTES
[facility]      NAME: Governor Bardales  AGE: 67 y o  SEX: male  : 1945   MRN: [id number]    DATE: 2021  TIME: 11:06 AM    Assessment and Plan     Problem List Items Addressed This Visit     Benign essential hypertension      Well controlled on quinapril 20         Relevant Orders    CBC and differential    TSH, 3rd generation with Free T4 reflex    Abnormal blood sugar - Primary      Last A1c 5 8%  Continue diet exercise  Will continue to monitor yearly         Relevant Orders    Comprehensive metabolic panel    Hemoglobin A1C    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(HCC)      Stable  Continue regular follow-up with dermatologist (Dr Aiyana Davila)         Arthritis      Doing well since discontinuing Celebrex         Benign prostatic hyperplasia with urinary hesitancy      Doing well on tamsulosin 0 4 mg nightly         Spinal stenosis of lumbar region without neurogenic claudication      Symptoms much improved since recent decompression surgery on 2020 by Dr Avon Meckel  Patient has been able to discontinue gabapentin and Celebrex           Other Visit Diagnoses     Screening for prostate cancer        Relevant Orders    PSA, Total Screen    Screening cholesterol level        Relevant Orders    Lipid Panel with Direct LDL reflex    Allergic rhinitis due to pollen, unspecified seasonality        Relevant Medications    montelukast (SINGULAIR) 10 mg tablet         order placed for Cologuard      Return to office in:  6 months, AWV  Yearly labs prior at [facility] lab    Chief Complaint     Chief Complaint   Patient presents with    Follow-up     6 months       History of Present Illness     Patient presents for recheck chronic medical problems today  Overall is feeling well without complaints  Doing well on quinapril for blood pressure  Doing well on tamsulosin for BPH    Doing well status post recent low back surgery on       The following portions of the patient's history were reviewed and updated as appropriate: allergies, current medications, past family history, past medical history, past social history, past surgical history and problem list     Review of Systems   Review of Systems   Respiratory: Negative  Cardiovascular: Negative  Gastrointestinal: Negative  Genitourinary: Negative  Active Problem List     Patient Active Problem List   Diagnosis    Benign essential hypertension    Abnormal blood sugar    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(HCC)    Arthritis    Benign prostatic hyperplasia with urinary hesitancy    Cervical strain    Lateral epicondylitis of right elbow    Impaired glucose tolerance    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)    Left thigh pain    Cough    Radiculopathy, lumbar region    Spinal stenosis of lumbar region without neurogenic claudication    Chronic bilateral low back pain without sciatica    Chronic pain syndrome       Objective   /70 (BP Location: Left arm, Patient Position: Sitting, Cuff Size: Adult)   Pulse 86   Temp 97 7 °F (36 5 °C) (Tympanic)   Resp 16   Ht 5' 11 65" (1 82 m)   Wt 102 kg (225 lb)   SpO2 97%   BMI 30 81 kg/m²     Physical Exam  Cardiovascular:      Rate and Rhythm: Normal rate and regular rhythm  Heart sounds: Normal heart sounds  Comments: Carotids: no bruits  Ext: no edema  Pulmonary:      Effort: Pulmonary effort is normal  No respiratory distress  Breath sounds: No wheezing or rales  Psychiatric:         Behavior: Behavior normal          Thought Content:  Thought content normal          Pertinent Laboratory/Diagnostic Studies:  Last labs reviewed    Current Medications     Current Outpatient Medications:     aspirin 81 mg chewable tablet, Chew 81 mg daily, Disp: , Rfl:     cholecalciferol (VITAMIN D3) 1,000 units tablet, Take 1 tablet by mouth daily, Disp: , Rfl:     clobetasol (TEMOVATE) 0 05 % ointment, APPLY TWICE A DAY FOR 2 WEEKS, THEN AS NEEDED FLARES, Disp: , Rfl: 1    glucosamine-chondroitin (COSAMIN DS) 500-400 MG tablet, Take 1 tablet by mouth 3 (three) times a day, Disp: , Rfl:     Ivermectin (SOOLANTRA) 1 % CREA, Apply topically daily as needed, Disp: , Rfl:     Multiple Vitamins-Minerals (CENTRUM SILVER 50+MEN PO), Take 1 tablet by mouth daily  , Disp: , Rfl:     patient supplied medication, Apply topically 3 (three) times a week Carmustine 0 04%/ 100gm, Disp: , Rfl:     quinapril (ACCUPRIL) 20 mg tablet, TAKE 1 TABLET DAILY AS NEEDED, Disp: 90 tablet, Rfl: 3    tamsulosin (FLOMAX) 0 4 mg, Take 1 capsule (0 4 mg total) by mouth daily with dinner, Disp: 90 capsule, Rfl: 3    montelukast (SINGULAIR) 10 mg tablet, Take 1 tablet (10 mg total) by mouth daily at bedtime, Disp: 90 tablet, Rfl: 0    Health Maintenance     Health Maintenance   Topic Date Due    COVID-19 Vaccine (1 of 2) 01/16/1961    DTaP,Tdap,and Td Vaccines (2 - Td) 09/01/2020    Fall Risk  01/29/2021    BMI: Followup Plan  01/29/2021    Medicare Annual Wellness Visit (AWV)  01/29/2021    Depression Screening PHQ  01/29/2022    BMI: Adult  01/29/2022    Pneumococcal Vaccine: 65+ Years  Completed    Influenza Vaccine  Completed    HIB Vaccine  Aged Out    Hepatitis B Vaccine  Aged Out    IPV Vaccine  Aged Out    Hepatitis A Vaccine  Aged Out    Meningococcal ACWY Vaccine  Aged Out    HPV Vaccine  Aged Out     Immunization History   Administered Date(s) Administered    INFLUENZA 09/22/2015, 09/19/2016, 09/15/2018    Influenza Split High Dose Preservative Free IM 10/10/2014, 09/22/2015, 09/19/2016, 08/31/2017    Influenza, high dose seasonal 0 7 mL 09/26/2019, 10/14/2020    Influenza, seasonal, injectable 09/14/2012    Pneumococcal Conjugate 13-Valent 07/09/2015    Pneumococcal Polysaccharide PPV23 11/01/2006, 01/29/2020    SARS-CoV-2 / COVID-19 mRNA IM (Moderna) 01/11/2021    Tdap 09/01/2010    Zoster 07/18/2008    Zoster Vaccine Recombinant 04/15/2019, 06/17/2019 [facility]

## 2021-02-23 ENCOUNTER — TELEPHONE (OUTPATIENT)
Dept: FAMILY MEDICINE CLINIC | Facility: CLINIC | Age: 76
End: 2021-02-23

## 2021-02-24 NOTE — TELEPHONE ENCOUNTER
I spoke with patient regarding his positive Cologuard test   He will be seeing GI in a few weeks (Dr David Morales)

## 2021-02-25 DIAGNOSIS — J30.1 ALLERGIC RHINITIS DUE TO POLLEN, UNSPECIFIED SEASONALITY: ICD-10-CM

## 2021-02-25 RX ORDER — MONTELUKAST SODIUM 10 MG/1
TABLET ORAL
Qty: 90 TABLET | Refills: 0 | Status: SHIPPED | OUTPATIENT
Start: 2021-02-25 | End: 2021-05-12 | Stop reason: SDUPTHER

## 2021-02-26 DIAGNOSIS — N40.1 BENIGN PROSTATIC HYPERPLASIA WITH URINARY HESITANCY: ICD-10-CM

## 2021-02-26 DIAGNOSIS — R39.11 BENIGN PROSTATIC HYPERPLASIA WITH URINARY HESITANCY: ICD-10-CM

## 2021-02-26 RX ORDER — TAMSULOSIN HYDROCHLORIDE 0.4 MG/1
0.4 CAPSULE ORAL
Qty: 90 CAPSULE | Refills: 3 | Status: SHIPPED | OUTPATIENT
Start: 2021-02-26 | End: 2022-02-02

## 2021-05-12 ENCOUNTER — OFFICE VISIT (OUTPATIENT)
Dept: FAMILY MEDICINE CLINIC | Facility: CLINIC | Age: 76
End: 2021-05-12
Payer: MEDICARE

## 2021-05-12 VITALS
BODY MASS INDEX: 31.5 KG/M2 | RESPIRATION RATE: 16 BRPM | HEART RATE: 91 BPM | HEIGHT: 71 IN | SYSTOLIC BLOOD PRESSURE: 110 MMHG | TEMPERATURE: 97.6 F | OXYGEN SATURATION: 96 % | DIASTOLIC BLOOD PRESSURE: 70 MMHG | WEIGHT: 225 LBS

## 2021-05-12 DIAGNOSIS — I72.6 ANEURYSM OF VERTEBRAL ARTERY (HCC): ICD-10-CM

## 2021-05-12 DIAGNOSIS — J30.1 ALLERGIC RHINITIS DUE TO POLLEN, UNSPECIFIED SEASONALITY: ICD-10-CM

## 2021-05-12 DIAGNOSIS — R09.89 BILATERAL CAROTID BRUITS: ICD-10-CM

## 2021-05-12 DIAGNOSIS — R51.9 NONINTRACTABLE HEADACHE, UNSPECIFIED CHRONICITY PATTERN, UNSPECIFIED HEADACHE TYPE: Primary | ICD-10-CM

## 2021-05-12 PROCEDURE — 99213 OFFICE O/P EST LOW 20 MIN: CPT | Performed by: FAMILY MEDICINE

## 2021-05-12 RX ORDER — MONTELUKAST SODIUM 10 MG/1
10 TABLET ORAL
Qty: 90 TABLET | Refills: 1 | Status: SHIPPED | OUTPATIENT
Start: 2021-05-12 | End: 2021-11-17

## 2021-05-12 NOTE — PROGRESS NOTES
[facility]      NAME: Edvin Escudero  AGE: 67 y o  SEX: male  : 1945   MRN: [id number]    DATE: 2021  TIME: 2:08 PM    Assessment and Plan     Problem List Items Addressed This Visit     Nonintractable headache - Primary     Patient presents for follow-up after brief episode  1 week ago  Patient was sitting eating dinner when he experienced a snap (or pop)  In the back of his head  He states he briefly lost consciousness  When he regained consciousness, he was dizzy for approximately 30 seconds  Symptoms then resolved  He has not experienced any further symptoms since that time  No neurologic deficits appreciated this time  Will sent for  Transcranial ultrasound to assess posterior circulation           Relevant Orders    VAS transcranial doppler with injection      Other Visit Diagnoses     Allergic rhinitis due to pollen, unspecified seasonality        Relevant Medications    montelukast (SINGULAIR) 10 mg tablet    Bilateral carotid bruits        Aneurysm of vertebral artery (HCC)         Relevant Orders    VAS transcranial doppler with injection              Return to office in:  Will call with results    Chief Complaint     Chief Complaint   Patient presents with    Dizziness       History of Present Illness      Patient presents for follow-up after brief episode  1 week ago  Patient was sitting eating dinner when he experienced a snap (or pop)  In the back of his head  He states he briefly lost consciousness  When he regained consciousness, he was dizzy for approximately 30 seconds  Symptoms then resolved  He has not experienced any further symptoms since that time  The following portions of the patient's history were reviewed and updated as appropriate: allergies, current medications, past family history, past medical history, past social history, past surgical history and problem list     Review of Systems   Review of Systems   Respiratory: Negative  There are no preventive care reminders to display for this patient.    Patient is up to date, no discussion needed.           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)    Left thigh pain    Cough    Radiculopathy, lumbar region    Spinal stenosis of lumbar region without neurogenic claudication    Chronic bilateral low back pain without sciatica    Chronic pain syndrome    Nonintractable headache       Objective   /70 (BP Location: Left arm, Patient Position: Sitting, Cuff Size: Adult)   Pulse 91   Temp 97 6 °F (36 4 °C) (Tympanic)   Resp 16   Ht 5' 10 87" (1 8 m)   Wt 102 kg (225 lb)   SpO2 96%   BMI 31 50 kg/m²     Physical Exam  Cardiovascular:      Rate and Rhythm: Normal rate and regular rhythm  Heart sounds: Normal heart sounds  Comments: Carotids: no bruits  Ext: no edema  Pulmonary:      Effort: Pulmonary effort is normal  No respiratory distress  Breath sounds: No wheezing or rales  Neurological:      General: No focal deficit present  Mental Status: He is oriented to person, place, and time  Cranial Nerves: No cranial nerve deficit  Sensory: No sensory deficit  Coordination: Coordination normal       Gait: Gait normal       Deep Tendon Reflexes: Reflexes normal    Psychiatric:         Behavior: Behavior normal          Thought Content:  Thought content normal          Pertinent Laboratory/Diagnostic Studies:  None    Current Medications     Current Outpatient Medications:     aspirin 81 mg chewable tablet, Chew 81 mg daily, Disp: , Rfl:     cholecalciferol (VITAMIN D3) 1,000 units tablet, Take 1 tablet by mouth daily, Disp: , Rfl:     clobetasol (TEMOVATE) 0 05 % ointment, APPLY TWICE A DAY FOR 2 WEEKS, THEN AS NEEDED FLARES, Disp: , Rfl: 1    glucosamine-chondroitin (COSAMIN DS) 500-400 MG tablet, Take 1 tablet by mouth 3 (three) times a day, Disp: , Rfl:     Ivermectin (SOOLANTRA) 1 % CREA, Apply topically daily as needed, Disp: , Rfl:     montelukast (SINGULAIR) 10 mg tablet, Take 1 tablet (10 mg total) by mouth daily at bedtime, Disp: 90 tablet, Rfl: 1    Multiple Vitamins-Minerals (CENTRUM SILVER 50+MEN PO), Take 1 tablet by mouth daily  , Disp: , Rfl:     patient supplied medication, Apply topically 3 (three) times a week Carmustine 0 04%/ 100gm, Disp: , Rfl:     quinapril (ACCUPRIL) 20 mg tablet, TAKE 1 TABLET DAILY AS NEEDED, Disp: 90 tablet, Rfl: 3    Sodium Sulfate-Mag Sulfate-KCl (Sutab) 8645-726-056 MG TABS, Take 1 kit by mouth see administration instructions Follow-instructions given at office visit, Disp: 24 tablet, Rfl: 0    tamsulosin (FLOMAX) 0 4 mg, Take 1 capsule (0 4 mg total) by mouth daily with dinner, Disp: 90 capsule, Rfl: 3    Health Maintenance     Health Maintenance   Topic Date Due    DTaP,Tdap,and Td Vaccines (2 - Td) 09/01/2020    Fall Risk  01/29/2021    Medicare Annual Wellness Visit (AWV)  01/29/2021    BMI: Followup Plan  01/29/2021    Colonoscopy Surveillance  10/26/2021    Depression Screening PHQ  01/29/2022    BMI: Adult  05/12/2022    Pneumococcal Vaccine: 65+ Years  Completed    Influenza Vaccine  Completed    COVID-19 Vaccine  Completed    HIB Vaccine  Aged Out    Hepatitis B Vaccine  Aged Out    IPV Vaccine  Aged Out    Hepatitis A Vaccine  Aged Out    Meningococcal ACWY Vaccine  Aged Out    HPV Vaccine  Aged Out     Immunization History   Administered Date(s) Administered    INFLUENZA 09/22/2015, 09/19/2016, 09/15/2018    Influenza Split High Dose Preservative Free IM 10/10/2014, 09/22/2015, 09/19/2016, 08/31/2017    Influenza, high dose seasonal 0 7 mL 09/26/2019, 10/14/2020    Influenza, seasonal, injectable 09/14/2012    Pneumococcal Conjugate 13-Valent 07/09/2015    Pneumococcal Polysaccharide PPV23 11/01/2006, 01/29/2020    SARS-CoV-2 / COVID-19 mRNA IM (Moderna) 01/11/2021    Tdap 09/01/2010    Zoster 07/18/2008    Zoster Vaccine Recombinant 04/15/2019, 06/17/2019       Kavita Dewitt DO  [facility]

## 2021-05-12 NOTE — ASSESSMENT & PLAN NOTE
Patient presents for follow-up after brief episode  1 week ago  Patient was sitting eating dinner when he experienced a snap (or pop)  In the back of his head  He states he briefly lost consciousness  When he regained consciousness, he was dizzy for approximately 30 seconds  Symptoms then resolved  He has not experienced any further symptoms since that time  No neurologic deficits appreciated this time    Will sent for  Transcranial ultrasound to assess posterior circulation

## 2021-05-19 ENCOUNTER — HOSPITAL ENCOUNTER (OUTPATIENT)
Dept: VASCULAR ULTRASOUND | Facility: HOSPITAL | Age: 76
Discharge: HOME | End: 2021-05-19
Payer: MEDICARE

## 2021-05-19 DIAGNOSIS — I65.23 CAROTID ARTERY STENOSIS WITHOUT CEREBRAL INFARCTION, BILATERAL: ICD-10-CM

## 2021-05-19 DIAGNOSIS — R51.9 NONINTRACTABLE HEADACHE, UNSPECIFIED CHRONICITY PATTERN, UNSPECIFIED HEADACHE TYPE: ICD-10-CM

## 2021-05-19 DIAGNOSIS — I72.6 ANEURYSM OF VERTEBRAL ARTERY (HCC): ICD-10-CM

## 2021-05-19 PROCEDURE — 93880 EXTRACRANIAL BILAT STUDY: CPT

## 2021-05-19 PROCEDURE — 93880 EXTRACRANIAL BILAT STUDY: CPT | Performed by: SURGERY

## 2021-08-02 ENCOUNTER — APPOINTMENT (OUTPATIENT)
Dept: LAB | Facility: CLINIC | Age: 76
End: 2021-08-02
Payer: MEDICARE

## 2021-08-02 DIAGNOSIS — Z12.5 SCREENING FOR PROSTATE CANCER: ICD-10-CM

## 2021-08-02 DIAGNOSIS — I10 BENIGN ESSENTIAL HYPERTENSION: ICD-10-CM

## 2021-08-02 DIAGNOSIS — R73.09 ABNORMAL BLOOD SUGAR: ICD-10-CM

## 2021-08-02 DIAGNOSIS — Z13.220 SCREENING CHOLESTEROL LEVEL: ICD-10-CM

## 2021-08-02 LAB
ALBUMIN SERPL BCP-MCNC: 3.6 G/DL (ref 3.5–5)
ALP SERPL-CCNC: 45 U/L (ref 46–116)
ALT SERPL W P-5'-P-CCNC: 29 U/L (ref 12–78)
ANION GAP SERPL CALCULATED.3IONS-SCNC: 3 MMOL/L (ref 4–13)
AST SERPL W P-5'-P-CCNC: 20 U/L (ref 5–45)
BASOPHILS # BLD AUTO: 0.02 THOUSANDS/ΜL (ref 0–0.1)
BASOPHILS NFR BLD AUTO: 0 % (ref 0–1)
BILIRUB SERPL-MCNC: 0.55 MG/DL (ref 0.2–1)
BUN SERPL-MCNC: 12 MG/DL (ref 5–25)
CALCIUM SERPL-MCNC: 9.1 MG/DL (ref 8.3–10.1)
CHLORIDE SERPL-SCNC: 108 MMOL/L (ref 100–108)
CHOLEST SERPL-MCNC: 151 MG/DL (ref 50–200)
CO2 SERPL-SCNC: 27 MMOL/L (ref 21–32)
CREAT SERPL-MCNC: 0.91 MG/DL (ref 0.6–1.3)
EOSINOPHIL # BLD AUTO: 0.4 THOUSAND/ΜL (ref 0–0.61)
EOSINOPHIL NFR BLD AUTO: 8 % (ref 0–6)
ERYTHROCYTE [DISTWIDTH] IN BLOOD BY AUTOMATED COUNT: 14.2 % (ref 11.6–15.1)
EST. AVERAGE GLUCOSE BLD GHB EST-MCNC: 117 MG/DL
GFR SERPL CREATININE-BSD FRML MDRD: 81 ML/MIN/1.73SQ M
GLUCOSE P FAST SERPL-MCNC: 92 MG/DL (ref 65–99)
HBA1C MFR BLD: 5.7 %
HCT VFR BLD AUTO: 48.2 % (ref 36.5–49.3)
HDLC SERPL-MCNC: 56 MG/DL
HGB BLD-MCNC: 15.8 G/DL (ref 12–17)
IMM GRANULOCYTES # BLD AUTO: 0.01 THOUSAND/UL (ref 0–0.2)
IMM GRANULOCYTES NFR BLD AUTO: 0 % (ref 0–2)
LDLC SERPL CALC-MCNC: 77 MG/DL (ref 0–100)
LYMPHOCYTES # BLD AUTO: 1.54 THOUSANDS/ΜL (ref 0.6–4.47)
LYMPHOCYTES NFR BLD AUTO: 30 % (ref 14–44)
MCH RBC QN AUTO: 31.3 PG (ref 26.8–34.3)
MCHC RBC AUTO-ENTMCNC: 32.8 G/DL (ref 31.4–37.4)
MCV RBC AUTO: 95 FL (ref 82–98)
MONOCYTES # BLD AUTO: 0.63 THOUSAND/ΜL (ref 0.17–1.22)
MONOCYTES NFR BLD AUTO: 12 % (ref 4–12)
NEUTROPHILS # BLD AUTO: 2.51 THOUSANDS/ΜL (ref 1.85–7.62)
NEUTS SEG NFR BLD AUTO: 50 % (ref 43–75)
NRBC BLD AUTO-RTO: 0 /100 WBCS
PLATELET # BLD AUTO: 183 THOUSANDS/UL (ref 149–390)
PMV BLD AUTO: 9.4 FL (ref 8.9–12.7)
POTASSIUM SERPL-SCNC: 4.5 MMOL/L (ref 3.5–5.3)
PROT SERPL-MCNC: 7.5 G/DL (ref 6.4–8.2)
PSA SERPL-MCNC: 3.2 NG/ML (ref 0–4)
RBC # BLD AUTO: 5.05 MILLION/UL (ref 3.88–5.62)
SODIUM SERPL-SCNC: 138 MMOL/L (ref 136–145)
TRIGL SERPL-MCNC: 89 MG/DL
TSH SERPL DL<=0.05 MIU/L-ACNC: 1.08 UIU/ML (ref 0.36–3.74)
WBC # BLD AUTO: 5.11 THOUSAND/UL (ref 4.31–10.16)

## 2021-08-02 PROCEDURE — G0103 PSA SCREENING: HCPCS

## 2021-08-02 PROCEDURE — 84443 ASSAY THYROID STIM HORMONE: CPT

## 2021-08-02 PROCEDURE — 85025 COMPLETE CBC W/AUTO DIFF WBC: CPT

## 2021-08-02 PROCEDURE — 80053 COMPREHEN METABOLIC PANEL: CPT

## 2021-08-02 PROCEDURE — 83036 HEMOGLOBIN GLYCOSYLATED A1C: CPT

## 2021-08-02 PROCEDURE — 36415 COLL VENOUS BLD VENIPUNCTURE: CPT

## 2021-08-02 PROCEDURE — 80061 LIPID PANEL: CPT

## 2021-08-09 ENCOUNTER — OFFICE VISIT (OUTPATIENT)
Dept: FAMILY MEDICINE CLINIC | Facility: CLINIC | Age: 76
End: 2021-08-09
Payer: MEDICARE

## 2021-08-09 VITALS
HEIGHT: 71 IN | SYSTOLIC BLOOD PRESSURE: 110 MMHG | OXYGEN SATURATION: 96 % | TEMPERATURE: 97.7 F | RESPIRATION RATE: 16 BRPM | WEIGHT: 226 LBS | DIASTOLIC BLOOD PRESSURE: 70 MMHG | HEART RATE: 74 BPM | BODY MASS INDEX: 31.64 KG/M2

## 2021-08-09 DIAGNOSIS — M79.641 RIGHT HAND PAIN: ICD-10-CM

## 2021-08-09 DIAGNOSIS — C85.80: ICD-10-CM

## 2021-08-09 DIAGNOSIS — R73.09 ABNORMAL BLOOD SUGAR: ICD-10-CM

## 2021-08-09 DIAGNOSIS — N40.1 BENIGN PROSTATIC HYPERPLASIA WITH URINARY HESITANCY: ICD-10-CM

## 2021-08-09 DIAGNOSIS — R39.11 BENIGN PROSTATIC HYPERPLASIA WITH URINARY HESITANCY: ICD-10-CM

## 2021-08-09 DIAGNOSIS — I10 BENIGN ESSENTIAL HYPERTENSION: ICD-10-CM

## 2021-08-09 DIAGNOSIS — M48.061 SPINAL STENOSIS OF LUMBAR REGION WITHOUT NEUROGENIC CLAUDICATION: ICD-10-CM

## 2021-08-09 DIAGNOSIS — Z00.00 ENCOUNTER FOR ANNUAL WELLNESS EXAM IN MEDICARE PATIENT: Primary | ICD-10-CM

## 2021-08-09 PROCEDURE — 1123F ACP DISCUSS/DSCN MKR DOCD: CPT | Performed by: FAMILY MEDICINE

## 2021-08-09 PROCEDURE — 99214 OFFICE O/P EST MOD 30 MIN: CPT | Performed by: FAMILY MEDICINE

## 2021-08-09 PROCEDURE — G0439 PPPS, SUBSEQ VISIT: HCPCS | Performed by: FAMILY MEDICINE

## 2021-08-09 NOTE — ASSESSMENT & PLAN NOTE
Patient has been complaining of right thumb and index finger pain recently  No recent trauma  Rx given for x-rays  If symptoms worsen    Will call with results

## 2021-08-09 NOTE — PATIENT INSTRUCTIONS

## 2021-08-09 NOTE — ASSESSMENT & PLAN NOTE
Improved since decompression surgery September 2020 by Dr Mayuri Alcantar    No longer on Celebrex or gabapentin

## 2021-08-09 NOTE — ASSESSMENT & PLAN NOTE
Blood sugar stable at 92  A1c from August 2nd was 5 7%  Continue reduced carb diet exercise    Will continue to follow

## 2021-08-09 NOTE — PROGRESS NOTES
Assessment and Plan:   MEDICARE WELLNESS VISIT    Problem List Items Addressed This Visit     Benign essential hypertension    Abnormal blood sugar    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(HCC)    Benign prostatic hyperplasia with urinary hesitancy    Spinal stenosis of lumbar region without neurogenic claudication      Other Visit Diagnoses     Encounter for annual wellness exam in Medicare patient    -  Primary           Preventive health issues were discussed with patient, and age appropriate screening tests were ordered as noted in patient's After Visit Summary  Personalized health advice and appropriate referrals for health education or preventive services given if needed, as noted in patient's After Visit Summary  History of Present Illness:     Patient presents for Welcome to Medicare visit       Patient Care Team:  Dayanara Hartman DO as PCP - General     Review of Systems:     Review of Systems   Problem List:     Patient Active Problem List   Diagnosis    Benign essential hypertension    Abnormal blood sugar    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(HCC)    Arthritis    Benign prostatic hyperplasia with urinary hesitancy    Cervical strain    Lateral epicondylitis of right elbow    Impaired glucose tolerance    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)    Left thigh pain    Cough    Radiculopathy, lumbar region    Spinal stenosis of lumbar region without neurogenic claudication    Chronic bilateral low back pain without sciatica    Chronic pain syndrome    Nonintractable headache      Past Medical and Surgical History:     Past Medical History:   Diagnosis Date    Abnormal blood chemistry     last assessed: 5/1/2013    Abnormal blood sugar     last assessed: 12/18/2014    Allergic rhinitis     last assessed: 11/8/2013    Basal cell carcinoma      Face    Cancer ([facility] [address] )     Diabetes mellitus ([facility] [address] )     last assessed: 4/30/2013    Low back pain     Nodule of finger of right hand     last assessed: 10/19/2015    Obstruction of right eustachian tube     last assessed: 10/11/2016     Past Surgical History:   Procedure Laterality Date    CATARACT EXTRACTION, BILATERAL      COLONOSCOPY  1999    1 polyp    COLONOSCOPY  2007    Hyperplastic polyp    COLONOSCOPY  2009     And 2013 Normal    COLONOSCOPY  12/29/2017    15 mm polyp proximal sigmoid colon-tubular adenoma  Diverticulosis by Dr Phillip Duron        Family History:     Family History   Problem Relation Age of Onset    No Known Problems Mother     No Known Problems Father     Colon cancer Son 43    Colon cancer Maternal Grandmother       Social History:     Social History     Socioeconomic History    Marital status: /Civil Union     Spouse name: None    Number of children: None    Years of education: None    Highest education level: None   Occupational History    None   Tobacco Use    Smoking status: Never Smoker    Smokeless tobacco: Never Used   Vaping Use    Vaping Use: Never used   Substance and Sexual Activity    Alcohol use: Yes     Comment: occasionally   Drug use: No    Sexual activity: None   Other Topics Concern    None   Social History Narrative    DOES NOT CONSUME CAFFEINE     Social Determinants of Health     Financial Resource Strain:     Difficulty of Paying Living Expenses:    Food Insecurity:     Worried About Running Out of Food in the Last Year:     Ran Out of Food in the Last Year:    Transportation Needs:     Lack of Transportation (Medical):      Lack of Transportation (Non-Medical):    Physical Activity:     Days of Exercise per Week:     Minutes of Exercise per Session:    Stress:     Feeling of Stress :    Social Connections:     Frequency of Communication with Friends and Family:     Frequency of Social Gatherings with Friends and Family:     Attends Scientologist Services:     Active Member of Clubs or Organizations:     Attends Club or Organization Meetings:  Marital Status:    Intimate Partner Violence:     Fear of Current or Ex-Partner:     Emotionally Abused:     Physically Abused:     Sexually Abused:       Medications and Allergies:     Current Outpatient Medications   Medication Sig Dispense Refill    aspirin 81 mg chewable tablet Chew 81 mg daily      cholecalciferol (VITAMIN D3) 1,000 units tablet Take 1 tablet by mouth daily      clobetasol (TEMOVATE) 0 05 % ointment APPLY TWICE A DAY FOR 2 WEEKS, THEN AS NEEDED FLARES  1    glucosamine-chondroitin (COSAMIN DS) 500-400 MG tablet Take 1 tablet by mouth 3 (three) times a day      Ivermectin (SOOLANTRA) 1 % CREA Apply topically daily as needed      montelukast (SINGULAIR) 10 mg tablet Take 1 tablet (10 mg total) by mouth daily at bedtime 90 tablet 1    Multiple Vitamins-Minerals (CENTRUM SILVER 50+MEN PO) Take 1 tablet by mouth daily        patient supplied medication Apply topically 3 (three) times a week Carmustine 0 04%/ 100gm      quinapril (ACCUPRIL) 20 mg tablet TAKE 1 TABLET DAILY AS NEEDED 90 tablet 3    Sodium Sulfate-Mag Sulfate-KCl (Sutab) 5132-475-569 MG TABS Take 1 kit by mouth see administration instructions Follow-instructions given at office visit 24 tablet 0    tamsulosin (FLOMAX) 0 4 mg Take 1 capsule (0 4 mg total) by mouth daily with dinner 90 capsule 3     No current facility-administered medications for this visit       Allergies   Allergen Reactions    Doxycycline     Amoxicillin Diarrhea    Prednisone Palpitations     Medrol Dose pack ok     Sulfamethoxazole-Trimethoprim      Septra      Immunizations:     Immunization History   Administered Date(s) Administered    INFLUENZA 09/22/2015, 09/19/2016, 09/15/2018    Influenza Split High Dose Preservative Free IM 10/10/2014, 09/22/2015, 09/19/2016, 08/31/2017    Influenza, high dose seasonal 0 7 mL 09/26/2019, 10/14/2020    Influenza, seasonal, injectable 09/14/2012    Pneumococcal Conjugate 13-Valent 07/09/2015    Pneumococcal Polysaccharide PPV23 11/01/2006, 01/29/2020    SARS-CoV-2 / COVID-19 mRNA IM (Moderna) 01/11/2021, 02/08/2021    Tdap 09/01/2010    Zoster 07/18/2008    Zoster Vaccine Recombinant 04/15/2019, 06/17/2019      Health Maintenance:         Topic Date Due    Hepatitis C Screening  Never done    Colorectal Cancer Screening  10/26/2021         Topic Date Due    DTaP,Tdap,and Td Vaccines (2 - Td or Tdap) 09/01/2020    Influenza Vaccine (1) 09/01/2021      Medicare Screening Tests and Risk Assessments:     Obdulia Giron is here for his Subsequent Wellness visit  Last Medicare Wellness visit information reviewed, patient interviewed and updates made to the record today  Health Risk Assessment:   Patient rates overall health as very good  Patient feels that their physical health rating is slightly worse  Patient is very satisfied with their life  Eyesight was rated as same  Hearing was rated as same  Patient feels that their emotional and mental health rating is same  Patients states they are never, rarely angry  Patient states they are never, rarely unusually tired/fatigued  Pain experienced in the last 7 days has been none  Patient states that he has experienced no weight loss or gain in last 6 months  Depression Screening:   PHQ-2 Score: 0      Fall Risk Screening: In the past year, patient has experienced: no history of falling in past year      Home Safety:  Patient does not have trouble with stairs inside or outside of their home  Patient has working smoke alarms and has no working carbon monoxide detector  Home safety hazards include: none  Nutrition:   Current diet is Regular  Medications:   Patient is currently taking over-the-counter supplements  OTC medications include: 81mg aspirin, Cenrum silver vitamin  Patient is able to manage medications       Activities of Daily Living (ADLs)/Instrumental Activities of Daily Living (IADLs):   Walk and transfer into and out of bed and chair?: Yes  Dress and groom yourself?: Yes    Bathe or shower yourself?: Yes    Feed yourself? Yes  Do your laundry/housekeeping?: Yes  Manage your money, pay your bills and track your expenses?: Yes  Make your own meals?: Yes    Do your own shopping?: Yes    Previous Hospitalizations:   Any hospitalizations or ED visits within the last 12 months?: Yes    How many hospitalizations have you had in the last year?: 1-2    Advance Care Planning:   Living will: Yes    Durable POA for healthcare: Yes    Advanced directive: Yes    Advanced directive counseling given: Yes    Five wishes given: Yes    End of Life Decisions reviewed with patient: Yes    Provider agrees with end of life decisions: Yes      Cognitive Screening:   Provider or family/friend/caregiver concerned regarding cognition?: No    PREVENTIVE SCREENINGS      Cardiovascular Screening:    General: Screening Current      Diabetes Screening:     General: Screening Current      Colorectal Cancer Screening:     General: Screening Current      Prostate Cancer Screening:    General: Screening Not Indicated      Osteoporosis Screening:    General: Risks and Benefits Discussed      Abdominal Aortic Aneurysm (AAA) Screening:        General: Risks and Benefits Discussed      Lung Cancer Screening:     General: Screening Not Indicated      Hepatitis C Screening:    General: Risks and Benefits Discussed    Screening, Brief Intervention, and Referral to Treatment (SBIRT)    Screening  Typical number of drinks in a day: 0  Typical number of drinks in a week: 0  Interpretation: Low risk drinking behavior  AUDIT-C Screenin) How often did you have a drink containing alcohol in the past year? monthly or less  2) How many drinks did you have on a typical day when you were drinking in the past year?  1 to 2  3) How often did you have 6 or more drinks on one occasion in the past year? never    AUDIT-C Score: 1  Interpretation: Score 0-3 (male): Negative screen for alcohol misuse    Single Item Drug Screening:  How often have you used an illegal drug (including marijuana) or a prescription medication for non-medical reasons in the past year? never    Single Item Drug Screen Score: 0  Interpretation: Negative screen for possible drug use disorder    No exam data present     Physical Exam:     /70 (BP Location: Left arm, Patient Position: Sitting, Cuff Size: Adult)   Pulse 74   Temp 97 7 °F (36 5 °C) (Temporal)   Resp 16   Ht 5' 10 87" (1 8 m)   Wt 103 kg (226 lb)   SpO2 96%   BMI 31 64 kg/m²     Physical Exam     Waqar Jacinto,

## 2021-08-09 NOTE — PROGRESS NOTES
[facility]      NAME: Mary Pandya  AGE: 67 y o  SEX: male  : 1945   MRN: [id number]    DATE: 2021  TIME: 12:48 PM    Assessment and Plan     Problem List Items Addressed This Visit     Benign essential hypertension      Well controlled on quinapril 20 mg daily         Abnormal blood sugar      Blood sugar stable at 92  A1c from  was 5 7%  Continue reduced carb diet exercise  Will continue to follow         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[facility])      Has been stable  Still following with dermatologist regularly (Dr Sid Hebert)         Benign prostatic hyperplasia with urinary hesitancy      Stable on tamsulosin 0 4 mg nightly         Spinal stenosis of lumbar region without neurogenic claudication      Improved since decompression surgery 2020 by Dr Edwar Self  No longer on Celebrex or gabapentin         Right hand pain      Patient has been complaining of right thumb and index finger pain recently  No recent trauma  Rx given for x-rays  If symptoms worsen  Will call with results         Relevant Orders    XR hand 3+ vw right      Other Visit Diagnoses     Encounter for annual wellness exam in Medicare patient    -  Primary        Labs  reviewed      Return to office in:  6 months, yearly labs 2022    Chief Complaint     Chief Complaint   Patient presents with   [facility] Wellness Visit       History of Present Illness      Patient presents for recheck chronic medical problems  Patient complains of right hand thumb and index finger pain  Otherwise he is doing well  Doing well on quinapril for blood pressure  Doing well on tamsulosin for BPH    Patient had labs done on       The following portions of the patient's history were reviewed and updated as appropriate: allergies, current medications, past family history, past medical history, past social history, past surgical history and problem list     Review of Systems   Review of Systems Respiratory: Negative  Cardiovascular: Negative  Gastrointestinal: Negative  Genitourinary: Negative  Musculoskeletal: Positive for arthralgias  Active Problem List     Patient Active Problem List   Diagnosis    Benign essential hypertension    Abnormal blood sugar    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(HCC)    Arthritis    Benign prostatic hyperplasia with urinary hesitancy    Cervical strain    Lateral epicondylitis of right elbow    Impaired glucose tolerance    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)    Left thigh pain    Cough    Radiculopathy, lumbar region    Spinal stenosis of lumbar region without neurogenic claudication    Chronic bilateral low back pain without sciatica    Chronic pain syndrome    Nonintractable headache    Right hand pain       Objective   /70 (BP Location: Left arm, Patient Position: Sitting, Cuff Size: Adult)   Pulse 74   Temp 97 7 °F (36 5 °C) (Temporal)   Resp 16   Ht 5' 10 87" (1 8 m)   Wt 103 kg (226 lb)   SpO2 96%   BMI 31 64 kg/m²     Physical Exam  Cardiovascular:      Rate and Rhythm: Normal rate and regular rhythm  Heart sounds: Normal heart sounds  Comments: Carotids: no bruits  Ext: no edema  Pulmonary:      Effort: Pulmonary effort is normal  No respiratory distress  Breath sounds: No wheezing or rales  Psychiatric:         Behavior: Behavior normal          Thought Content:  Thought content normal          Pertinent Laboratory/Diagnostic Studies:  Labs from August 2nd    Current Medications     Current Outpatient Medications:     aspirin 81 mg chewable tablet, Chew 81 mg daily, Disp: , Rfl:     cholecalciferol (VITAMIN D3) 1,000 units tablet, Take 1 tablet by mouth daily, Disp: , Rfl:     clobetasol (TEMOVATE) 0 05 % ointment, APPLY TWICE A DAY FOR 2 WEEKS, THEN AS NEEDED FLARES, Disp: , Rfl: 1    glucosamine-chondroitin (COSAMIN DS) 500-400 MG tablet, Take 1 tablet by mouth 3 (three) times a day, Disp: , Rfl:     Ivermectin (SOOLANTRA) 1 % CREA, Apply topically daily as needed, Disp: , Rfl:     montelukast (SINGULAIR) 10 mg tablet, Take 1 tablet (10 mg total) by mouth daily at bedtime, Disp: 90 tablet, Rfl: 1    Multiple Vitamins-Minerals (CENTRUM SILVER 50+MEN PO), Take 1 tablet by mouth daily  , Disp: , Rfl:     patient supplied medication, Apply topically 3 (three) times a week Carmustine 0 04%/ 100gm, Disp: , Rfl:     quinapril (ACCUPRIL) 20 mg tablet, TAKE 1 TABLET DAILY AS NEEDED, Disp: 90 tablet, Rfl: 3    Sodium Sulfate-Mag Sulfate-KCl (Sutab) 8419-484-413 MG TABS, Take 1 kit by mouth see administration instructions Follow-instructions given at office visit, Disp: 24 tablet, Rfl: 0    tamsulosin (FLOMAX) 0 4 mg, Take 1 capsule (0 4 mg total) by mouth daily with dinner, Disp: 90 capsule, Rfl: 3    Health Maintenance     Health Maintenance   Topic Date Due    Hepatitis C Screening  Never done    DTaP,Tdap,and Td Vaccines (2 - Td or Tdap) 09/01/2020    BMI: Followup Plan  01/29/2021    Influenza Vaccine (1) 09/01/2021    Colorectal Cancer Screening  10/26/2021    BMI: Adult  05/12/2022    Fall Risk  08/09/2022    Depression Screening PHQ  08/09/2022    Medicare Annual Wellness Visit (AWV)  08/09/2022    Pneumococcal Vaccine: 65+ Years  Completed    COVID-19 Vaccine  Completed    HIB Vaccine  Aged Out    Hepatitis B Vaccine  Aged Out    IPV Vaccine  Aged Out    Hepatitis A Vaccine  Aged Out    Meningococcal ACWY Vaccine  Aged Out    HPV Vaccine  Aged [employer] History   Administered Date(s) Administered    INFLUENZA 09/22/2015, 09/19/2016, 09/15/2018    Influenza Split High Dose Preservative Free IM 10/10/2014, 09/22/2015, 09/19/2016, 08/31/2017    Influenza, high dose seasonal 0 7 mL 09/26/2019, 10/14/2020    Influenza, seasonal, injectable 09/14/2012    Pneumococcal Conjugate 13-Valent 07/09/2015    Pneumococcal Polysaccharide PPV23 11/01/2006, 01/29/2020  SARS-CoV-2 / COVID-19 mRNA IM (Moderna) 01/11/2021, 02/08/2021    Tdap 09/01/2010    Zoster 07/18/2008    Zoster Vaccine Recombinant 04/15/2019, 06/17/2019       Dayanara Hartman, DO  [facility]  Answers for HPI/ROS submitted by the patient on 8/2/2021  How would you rate your overall health?: very good  Compared to last year, how is your physical health?: slightly worse  In general, how satisfied are you with your life?: very satisfied  Compared to last year, how is your eyesight?: same  Compared to last year, how is your hearing?: same  Compared to last year, how is your emotional/mental health?: same  How often is anger a problem for you?: never, rarely  How often do you feel unusually tired/fatigued?: never, rarely  In the past 7 days, how much pain have you experienced?: none  In the past 6 months, have you lost or gained 10 pounds without trying?: No  One or more falls in the last year: No  Do you have trouble with the stairs inside or outside your home?: No  Does your home have working smoke alarms?: Yes  Does your home have a carbon monoxide monitor?: No  Which safety hazards (if any) have you experienced in your home? Please select all that apply : none  How would you describe your current diet?  Please select all that apply : Regular  In addition to prescription medications, are you taking any over-the-counter supplements?: Yes  If yes, what supplements are you taking?: 81mg aspirin, Cenrum silver vitamin  Can you manage your medications?: Yes  Are you currently taking any opioid medications?: No  Can you walk and transfer into and out of your bed and chair?: Yes  Can you dress and groom yourself?: Yes  Can you bathe or shower yourself?: Yes  Can you feed yourself?: Yes  Can you do your laundry/ housekeeping?: Yes  Can you manage your money, pay your bills, and track your expenses?: Yes  Can you make your own meals?: Yes  Can you do your own shopping?: Yes  Within the last 12 months, have you had any hospitalizations or Emergency Department visits?: Yes  If yes, how many times have you been hospitalized within the past year?: 1-2  Do you have a living will?: Yes  Do you have a Durable POA ([address]) for healthcare decisions?: Yes  Do you have an Advanced Directive for end of life decisions?: Yes  How often have you used an illegal drug (including marijuana) or a prescription medication for non-medical reasons in the past year?: never  What is the typical number of drinks you consume in a day?: 0  What is the typical number of drinks you consume in a week?: 0  How often did you have a drink containing alcohol in the past year?: monthly or less  How many drinks did you have on a typical day  when you were drinking in the past year?: 1 to 2  How often did you have 6 or more drinks on one occasion in the past year?: never

## 2021-09-23 DIAGNOSIS — I10 ESSENTIAL HYPERTENSION: ICD-10-CM

## 2021-09-23 RX ORDER — QUINAPRIL 20 MG/1
TABLET ORAL
Qty: 90 TABLET | Refills: 1 | Status: SHIPPED | OUTPATIENT
Start: 2021-09-23 | End: 2021-10-25

## 2021-09-30 ENCOUNTER — TELEPHONE (OUTPATIENT)
Dept: FAMILY MEDICINE CLINIC | Facility: CLINIC | Age: 76
End: 2021-09-30

## 2021-09-30 NOTE — TELEPHONE ENCOUNTER
Pt called, concerned about rectal bleeding  Pt states he has internal hemorrhoids and this morning noticed bright red blood after a bowel movement but this evening noticed a darker, black blood with his BM, states it 'poured out' of him  I asked if this resembled coffee grounds to which he said it was clear but black, reminded him of after he drank the liquid before a colonoscopy  Per Gio Scarce pt should go to ER

## 2021-10-05 ENCOUNTER — TRANSITIONAL CARE MANAGEMENT (OUTPATIENT)
Dept: FAMILY MEDICINE CLINIC | Facility: CLINIC | Age: 76
End: 2021-10-05

## 2021-10-08 ENCOUNTER — OFFICE VISIT (OUTPATIENT)
Dept: FAMILY MEDICINE CLINIC | Facility: CLINIC | Age: 76
End: 2021-10-08
Payer: MEDICARE

## 2021-10-08 VITALS
WEIGHT: 224.4 LBS | HEART RATE: 72 BPM | BODY MASS INDEX: 31.42 KG/M2 | HEIGHT: 71 IN | DIASTOLIC BLOOD PRESSURE: 72 MMHG | OXYGEN SATURATION: 98 % | SYSTOLIC BLOOD PRESSURE: 116 MMHG | TEMPERATURE: 98.4 F | RESPIRATION RATE: 18 BRPM

## 2021-10-08 DIAGNOSIS — K62.5 RECTAL BLEEDING: ICD-10-CM

## 2021-10-08 DIAGNOSIS — R55 VASOVAGAL SYNCOPE: ICD-10-CM

## 2021-10-08 DIAGNOSIS — I10 BENIGN ESSENTIAL HYPERTENSION: Primary | ICD-10-CM

## 2021-10-08 PROCEDURE — 99496 TRANSJ CARE MGMT HIGH F2F 7D: CPT | Performed by: FAMILY MEDICINE

## 2021-10-15 ENCOUNTER — APPOINTMENT (OUTPATIENT)
Dept: LAB | Facility: CLINIC | Age: 76
End: 2021-10-15
Payer: MEDICARE

## 2021-10-15 DIAGNOSIS — I10 BENIGN ESSENTIAL HYPERTENSION: ICD-10-CM

## 2021-10-15 DIAGNOSIS — K62.5 RECTAL BLEEDING: ICD-10-CM

## 2021-10-15 LAB
ANION GAP SERPL CALCULATED.3IONS-SCNC: 1 MMOL/L (ref 4–13)
BUN SERPL-MCNC: 9 MG/DL (ref 5–25)
CALCIUM SERPL-MCNC: 9.3 MG/DL (ref 8.3–10.1)
CHLORIDE SERPL-SCNC: 111 MMOL/L (ref 100–108)
CO2 SERPL-SCNC: 28 MMOL/L (ref 21–32)
CREAT SERPL-MCNC: 0.94 MG/DL (ref 0.6–1.3)
ERYTHROCYTE [DISTWIDTH] IN BLOOD BY AUTOMATED COUNT: 14.5 % (ref 11.6–15.1)
GFR SERPL CREATININE-BSD FRML MDRD: 78 ML/MIN/1.73SQ M
GLUCOSE P FAST SERPL-MCNC: 91 MG/DL (ref 65–99)
HCT VFR BLD AUTO: 39.9 % (ref 36.5–49.3)
HGB BLD-MCNC: 13 G/DL (ref 12–17)
MCH RBC QN AUTO: 31.5 PG (ref 26.8–34.3)
MCHC RBC AUTO-ENTMCNC: 32.6 G/DL (ref 31.4–37.4)
MCV RBC AUTO: 97 FL (ref 82–98)
PLATELET # BLD AUTO: 207 THOUSANDS/UL (ref 149–390)
PMV BLD AUTO: 9.2 FL (ref 8.9–12.7)
POTASSIUM SERPL-SCNC: 4.1 MMOL/L (ref 3.5–5.3)
RBC # BLD AUTO: 4.13 MILLION/UL (ref 3.88–5.62)
SODIUM SERPL-SCNC: 140 MMOL/L (ref 136–145)
WBC # BLD AUTO: 4.79 THOUSAND/UL (ref 4.31–10.16)

## 2021-10-15 PROCEDURE — 36415 COLL VENOUS BLD VENIPUNCTURE: CPT

## 2021-10-15 PROCEDURE — 80048 BASIC METABOLIC PNL TOTAL CA: CPT

## 2021-10-15 PROCEDURE — 85027 COMPLETE CBC AUTOMATED: CPT

## 2021-10-25 ENCOUNTER — OFFICE VISIT (OUTPATIENT)
Dept: FAMILY MEDICINE CLINIC | Facility: CLINIC | Age: 76
End: 2021-10-25
Payer: MEDICARE

## 2021-10-25 VITALS
OXYGEN SATURATION: 98 % | BODY MASS INDEX: 30.2 KG/M2 | WEIGHT: 223 LBS | SYSTOLIC BLOOD PRESSURE: 120 MMHG | HEART RATE: 80 BPM | HEIGHT: 72 IN | DIASTOLIC BLOOD PRESSURE: 78 MMHG | RESPIRATION RATE: 16 BRPM | TEMPERATURE: 97.5 F

## 2021-10-25 DIAGNOSIS — R55 VASOVAGAL SYNCOPE: ICD-10-CM

## 2021-10-25 DIAGNOSIS — K92.2 GASTROINTESTINAL HEMORRHAGE, UNSPECIFIED GASTROINTESTINAL HEMORRHAGE TYPE: ICD-10-CM

## 2021-10-25 DIAGNOSIS — I10 BENIGN ESSENTIAL HYPERTENSION: ICD-10-CM

## 2021-10-25 DIAGNOSIS — I49.3 PVC (PREMATURE VENTRICULAR CONTRACTION): Primary | ICD-10-CM

## 2021-10-25 PROCEDURE — 99214 OFFICE O/P EST MOD 30 MIN: CPT | Performed by: FAMILY MEDICINE

## 2021-11-17 DIAGNOSIS — J30.1 ALLERGIC RHINITIS DUE TO POLLEN, UNSPECIFIED SEASONALITY: ICD-10-CM

## 2021-11-17 RX ORDER — MONTELUKAST SODIUM 10 MG/1
TABLET ORAL
Qty: 90 TABLET | Refills: 1 | Status: SHIPPED | OUTPATIENT
Start: 2021-11-17 | End: 2022-02-09

## 2021-11-30 ENCOUNTER — APPOINTMENT (OUTPATIENT)
Dept: RADIOLOGY | Facility: CLINIC | Age: 76
End: 2021-11-30
Payer: MEDICARE

## 2021-11-30 DIAGNOSIS — M79.641 RIGHT HAND PAIN: ICD-10-CM

## 2021-11-30 PROCEDURE — 73130 X-RAY EXAM OF HAND: CPT

## 2021-12-14 ENCOUNTER — OFFICE VISIT (OUTPATIENT)
Dept: CARDIOLOGY CLINIC | Facility: CLINIC | Age: 76
End: 2021-12-14
Payer: MEDICARE

## 2021-12-14 VITALS
HEART RATE: 84 BPM | DIASTOLIC BLOOD PRESSURE: 82 MMHG | WEIGHT: 226.6 LBS | SYSTOLIC BLOOD PRESSURE: 130 MMHG | HEIGHT: 72 IN | OXYGEN SATURATION: 98 % | RESPIRATION RATE: 19 BRPM | BODY MASS INDEX: 30.69 KG/M2

## 2021-12-14 DIAGNOSIS — R00.2 PALPITATIONS: ICD-10-CM

## 2021-12-14 DIAGNOSIS — R07.89 CHEST DISCOMFORT: Primary | ICD-10-CM

## 2021-12-14 PROCEDURE — 93000 ELECTROCARDIOGRAM COMPLETE: CPT

## 2021-12-14 PROCEDURE — 99204 OFFICE O/P NEW MOD 45 MIN: CPT

## 2021-12-16 ENCOUNTER — HOSPITAL ENCOUNTER (OUTPATIENT)
Dept: NON INVASIVE DIAGNOSTICS | Facility: HOSPITAL | Age: 76
Discharge: HOME | End: 2021-12-16
Payer: MEDICARE

## 2021-12-16 DIAGNOSIS — R00.2 PALPITATIONS: ICD-10-CM

## 2021-12-16 PROCEDURE — 93225 XTRNL ECG REC<48 HRS REC: CPT

## 2021-12-16 PROCEDURE — 93226 XTRNL ECG REC<48 HR SCAN A/R: CPT

## 2021-12-23 PROCEDURE — 93227 XTRNL ECG REC<48 HR R&I: CPT | Performed by: INTERNAL MEDICINE

## 2021-12-30 ENCOUNTER — TELEPHONE (OUTPATIENT)
Dept: CARDIOLOGY CLINIC | Facility: CLINIC | Age: 76
End: 2021-12-30

## 2022-02-02 DIAGNOSIS — R39.11 BENIGN PROSTATIC HYPERPLASIA WITH URINARY HESITANCY: ICD-10-CM

## 2022-02-02 DIAGNOSIS — N40.1 BENIGN PROSTATIC HYPERPLASIA WITH URINARY HESITANCY: ICD-10-CM

## 2022-02-02 RX ORDER — TAMSULOSIN HYDROCHLORIDE 0.4 MG/1
CAPSULE ORAL
Qty: 90 CAPSULE | Refills: 3 | Status: SHIPPED | OUTPATIENT
Start: 2022-02-02

## 2022-02-09 ENCOUNTER — OFFICE VISIT (OUTPATIENT)
Dept: FAMILY MEDICINE CLINIC | Facility: CLINIC | Age: 77
End: 2022-02-09
Payer: MEDICARE

## 2022-02-09 VITALS
TEMPERATURE: 97.7 F | OXYGEN SATURATION: 97 % | SYSTOLIC BLOOD PRESSURE: 130 MMHG | WEIGHT: 226 LBS | DIASTOLIC BLOOD PRESSURE: 80 MMHG | HEIGHT: 71 IN | HEART RATE: 74 BPM | BODY MASS INDEX: 31.64 KG/M2 | RESPIRATION RATE: 16 BRPM

## 2022-02-09 DIAGNOSIS — R73.09 ABNORMAL BLOOD SUGAR: ICD-10-CM

## 2022-02-09 DIAGNOSIS — R00.2 PALPITATIONS: ICD-10-CM

## 2022-02-09 DIAGNOSIS — G89.29 CHRONIC PAIN OF RIGHT THUMB: ICD-10-CM

## 2022-02-09 DIAGNOSIS — M48.061 SPINAL STENOSIS OF LUMBAR REGION WITHOUT NEUROGENIC CLAUDICATION: ICD-10-CM

## 2022-02-09 DIAGNOSIS — I10 BENIGN ESSENTIAL HYPERTENSION: ICD-10-CM

## 2022-02-09 DIAGNOSIS — N40.1 BENIGN PROSTATIC HYPERPLASIA WITH URINARY HESITANCY: ICD-10-CM

## 2022-02-09 DIAGNOSIS — M79.644 CHRONIC PAIN OF RIGHT THUMB: ICD-10-CM

## 2022-02-09 DIAGNOSIS — R39.11 BENIGN PROSTATIC HYPERPLASIA WITH URINARY HESITANCY: ICD-10-CM

## 2022-02-09 DIAGNOSIS — Z12.5 SCREENING FOR PROSTATE CANCER: Primary | ICD-10-CM

## 2022-02-09 DIAGNOSIS — Z13.220 SCREENING CHOLESTEROL LEVEL: ICD-10-CM

## 2022-02-09 DIAGNOSIS — C85.80: ICD-10-CM

## 2022-02-09 PROCEDURE — 99214 OFFICE O/P EST MOD 30 MIN: CPT | Performed by: FAMILY MEDICINE

## 2022-02-09 NOTE — ASSESSMENT & PLAN NOTE
Overall improved since decompression surgery September 2020  No longer on meds (Celebrex or gabapentin)

## 2022-02-09 NOTE — PROGRESS NOTES
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[address] )     Continue regular follow-up with his dermatologist         Benign prostatic hyperplasia with urinary hesitancy     Stable on tamsulosin 0 4 milligrams daily         Spinal stenosis of lumbar region without neurogenic claudication     Overall improved since decompression surgery 2020  No longer on meds (Celebrex or gabapentin)  Chronic pain of right thumb     Patient with ongoing pain in his right thumb  X-rays performed 2021 showed arthritis  Consider referral to PT if symptoms worsen         Palpitations     Patient with history of palpitations  He was seen by Cardiology and had 48 hour Holter monitor  Results showed PVCs bigeminy, trigeminy, but no significant arrhythmias  Patient states that his symptoms have improved significantly since he eliminated caffeine from his diet    Recommend continue follow-up with cardiology as directed (he has appointment on )           Other Visit Diagnoses     Screening for prostate cancer    -  Primary    Relevant Orders    PSA, Total Screen    Screening cholesterol level        Relevant Orders    Lipid Panel with Direct LDL reflex        Patient had COVID vaccination, and booster  Patient Shingrix  Last tetanus  (I advised him to go to pharmacy to have booster)    He is colonoscopy on       Return to office in: 6 mos, FBW prior at [facility]) lab    Chief Complaint     Chief Complaint   Patient presents with    Follow-up     6 months       History of Present Illness     Patient presents recheck chronic medical problems today  Overall doing well  Recently had Holter monitor  The following portions of the patient's history were reviewed and updated as appropriate: allergies, current medications, past family history, past medical history, past social history, past surgical history and problem list     Review of Systems   Review of Systems   Respiratory: Negative  Cardiovascular: Negative  Gastrointestinal: Negative  Genitourinary: Negative  Active Problem List     Patient Active Problem List   Diagnosis    Benign essential hypertension    Abnormal blood sugar    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(HCC)    Arthritis    Benign prostatic hyperplasia with urinary hesitancy    Cervical strain    Lateral epicondylitis of right elbow    Impaired glucose tolerance    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)    Left thigh pain    Cough    Radiculopathy, lumbar region    Spinal stenosis of lumbar region without neurogenic claudication    Chronic bilateral low back pain without sciatica    Chronic pain syndrome    Nonintractable headache    Chronic pain of right thumb    Rectal bleeding    Vasovagal syncope    Palpitations       Objective   /80 (BP Location: Left arm, Patient Position: Sitting, Cuff Size: Adult)   Pulse 74   Temp 97 7 °F (36 5 °C) (Temporal)   Resp 16   Ht 5' 11 26" (1 81 m)   Wt 103 kg (226 lb)   SpO2 97%   BMI 31 29 kg/m²     Physical Exam  Cardiovascular:      Rate and Rhythm: Normal rate and regular rhythm  Heart sounds: Normal heart sounds  Comments: Carotids: no bruits  Ext: no edema  Pulmonary:      Effort: Pulmonary effort is normal  No respiratory distress  Breath sounds: No wheezing or rales     Psychiatric:         Behavior: Behavior normal          Thought Content:  Thought content normal          Pertinent Laboratory/Diagnostic Studies:  labs    Current Medications     Current Outpatient Medications:     aspirin 81 mg chewable tablet, Chew 81 mg daily, Disp: , Rfl:     cholecalciferol (VITAMIN D3) 1,000 units tablet, Take 1 tablet by mouth daily, Disp: , Rfl:     clobetasol (TEMOVATE) 0 05 % ointment, APPLY TWICE A DAY FOR 2 WEEKS, THEN AS NEEDED FLARES, Disp: , Rfl: 1    Ivermectin (SOOLANTRA) 1 % CREA, Apply topically daily as needed, Disp: , Rfl:     Multiple Vitamins-Minerals (CENTRUM SILVER 50+MEN PO), Take 1 tablet by mouth daily  , Disp: , Rfl:     Na Sulfate-K Sulfate-Mg Sulf (Suprep Bowel Prep Kit) 17 5-3 13-1 6 GM/177ML SOLN, Follow office instructions, Disp: 177 mL, Rfl: 0    tamsulosin (FLOMAX) 0 4 mg, TAKE 1 CAPSULE BY MOUTH EVERY DAY WITH DINNER, Disp: 90 capsule, Rfl: 3    Psyllium 48 57 % POWD, Take 1 packet by mouth daily, Disp: , Rfl:     Health Maintenance     Health Maintenance   Topic Date Due    Hepatitis C Screening  Never done    DTaP,Tdap,and Td Vaccines (2 - Td or Tdap) 09/01/2020    BMI: Followup Plan  01/29/2021    COVID-19 Vaccine (3 - Booster for Moderna series) 07/08/2021    Colorectal Cancer Screening  10/26/2021    Depression Screening  08/09/2022    Fall Risk  08/09/2022    Medicare Annual Wellness Visit (AWV)  08/09/2022    BMI: Adult  02/09/2023    Pneumococcal Vaccine: 65+ Years  Completed    Influenza Vaccine  Completed    HIB Vaccine  Aged Out    Hepatitis B Vaccine  Aged Out    IPV Vaccine  Aged Out    Hepatitis A Vaccine  Aged Out    Meningococcal ACWY Vaccine  Aged Out    HPV Vaccine  Aged Out     Immunization History   Administered Date(s) Administered    COVID-19 MODERNA VACC 0 5 ML IM 01/11/2021, 02/08/2021    INFLUENZA 09/22/2015, 09/19/2016, 09/15/2018, 09/24/2021    Influenza Split High Dose Preservative Free IM 10/10/2014, 09/22/2015, 09/19/2016, 08/31/2017    Influenza, high dose seasonal 0 7 mL 09/26/2019, 10/14/2020    Influenza, seasonal, injectable 09/14/2012    Pneumococcal Conjugate 13-Valent 07/09/2015    Pneumococcal Polysaccharide PPV23 11/01/2006, 01/29/2020    Tdap 09/01/2010    Zoster 07/18/2008    Zoster Vaccine Recombinant 04/15/2019, 06/17/2019       Justyn Foster DO  [facility]

## 2022-02-09 NOTE — ASSESSMENT & PLAN NOTE
Patient with history of palpitations  He was seen by Cardiology and had 48 hour Holter monitor  Results showed PVCs bigeminy, trigeminy, but no significant arrhythmias  Patient states that his symptoms have improved significantly since he eliminated caffeine from his diet    Recommend continue follow-up with cardiology as directed (he has appointment on March 17th)

## 2022-02-09 NOTE — ASSESSMENT & PLAN NOTE
Patient with ongoing pain in his right thumb  X-rays performed 11/30/2021 showed arthritis    Consider referral to PT if symptoms worsen

## 2022-03-17 ENCOUNTER — OFFICE VISIT (OUTPATIENT)
Dept: CARDIOLOGY CLINIC | Facility: CLINIC | Age: 77
End: 2022-03-17
Payer: MEDICARE

## 2022-03-17 VITALS
DIASTOLIC BLOOD PRESSURE: 68 MMHG | SYSTOLIC BLOOD PRESSURE: 120 MMHG | BODY MASS INDEX: 31.43 KG/M2 | HEART RATE: 80 BPM | WEIGHT: 227 LBS

## 2022-03-17 DIAGNOSIS — I77.810 DILATED AORTIC ROOT (HCC): Primary | ICD-10-CM

## 2022-03-17 PROCEDURE — 99213 OFFICE O/P EST LOW 20 MIN: CPT

## 2022-03-17 NOTE — PROGRESS NOTES
Cardiology   MD Sol Grey MD Daril Cruz, DO, Lennox Champagne Mansoor, MD Zadie Perch, DO, Adwoa Lilly DO, [facility] [place]  -------------------------------------------------------------------  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  · Recent hospitalization for rectal bleeding  ·    · He presents today for follow-up evaluation Cardiology for PVCs  When he had his recent hospitalization for rectal bleeding on telemetry it was noticed that he had significant PVC burden  Echocardiogram was performed at that time which showed preserved ejection fraction with no significant valvular heart disease  ·   · His aorta on echo was mildly dilated at 4 4 cm      He states he is doing well  His symptoms of palpitations have just about totally resolved  He changed his diet plan he credits this with the change in his symptoms  He has no complaints today  Blood pressure has been stable, he was on quinapril before but this was stopped due to hypotension  Review of Systems   Constitutional: Negative for chills and fever  HENT: Negative for facial swelling and sore throat  Eyes: Negative for visual disturbance  Respiratory: Negative for cough, chest tightness, shortness of breath and wheezing  Cardiovascular: Negative for chest pain, palpitations and leg swelling  Gastrointestinal: Negative for abdominal pain, blood in stool, constipation, diarrhea, nausea and vomiting  Endocrine: Negative for cold intolerance and heat intolerance  Genitourinary: Negative for decreased urine volume, difficulty urinating, dysuria and hematuria  Musculoskeletal: Negative for arthralgias, back pain and myalgias  Skin: Negative for rash  Neurological: Negative for dizziness, syncope, weakness and numbness  Psychiatric/Behavioral: Negative for agitation, behavioral problems and confusion  The patient is not nervous/anxious  OBJECTIVE  Vitals:    03/17/22 1312   BP: 120/68   Pulse: 80       Physical Exam  Constitutional: awake, alert and oriented, in no acute distress, no obvious deformities  Head: Normocephalic, without obvious abnormality, atraumatic  Eyes: conjunctivae clear and moist  Sclera anicteric  No xanthelasmas  Pupils equal bilaterally  Extraocular motions are full  Ear nose mouth and throat: ears are symmetrical bilaterally, hearing appears to be equal bilaterally, no nasal discharge or epistaxis, oropharynx is clear with moist mucous membranes  Neck:  Trachea is midline, neck is supple, no thyromegaly or significant lymphadenopathy, there is full range of motion  Lungs: clear to auscultation bilaterally, no wheezes, no rales, no rhonchi, no accessory muscle use, breathing is nonlabored  Heart: regular rate and rhythm, S1, S2 normal, no murmur, no click, no rub and no gallop, no lower extremity edema  Abdomen: soft, non-tender; bowel sounds normal; no masses,  no organomegaly  Psychiatric:  Patient is oriented to time, place, person, mood/affect is negative for depression, anxiety, agitation, appears to have appropriate insight  Skin: Skin is warm, dry, intact  No obvious rashes or lesions on exposed extremities  Nail beds are pink with no cyanosis or clubbing  EKG:  No results found for this visit on 03/17/22  IMPRESSION:  1  Palpitations   2  Frequent PVCs   3  Rectal bleeding   4  Vasovagal syncope  5  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  6  Ascending aorta dilation 4 4 cm  7   Grade 1 diastolic dysfunction     EF 55 60%    DISCUSSION/RECOMMENDATIONS:   His palpitations have resolved   Holter shows low PVC burden 3 9%    His symptoms have significantly augmented with change in diet alone    Would continue with current therapy    Blood pressure is stable    Will need repeat evaluation of dilated aortic root/ascending aorta  Will order CT scan for October/November 2022 with plan to follow-up in the office thereafter    Brigitte Tapia DO, [facility] [place]  --------------------------------------------------------------------------------  TREADMILL STRESS  No results found for this or any previous visit      ----------------------------------------------------------------------------------------------  NUCLEAR STRESS TEST: No results found for this or any previous visit  No results found for this or any previous visit       --------------------------------------------------------------------------------  CATH:  No results found for this or any previous visit     --------------------------------------------------------------------------------  ECHO:   No results found for this or any previous visit  No results found for this or any previous visit     --------------------------------------------------------------------------------  HOLTER  No results found for this or any previous visit  No results found for this or any previous visit     --------------------------------------------------------------------------------  CAROTIDS  Results for orders placed during the hospital encounter of 05/19/21    VAS carotid complete study    Narrative  [facility] REPORT  CLINICAL:  Indications:  Patient presents with to evaluate for carotid artery stenosis s/p recent  possible TIA  The patient admits to hearing a thump in the back of his head  with feelings of dizziness afterwards  Patient is currently asymptomatic  Operative History:  Patient denies any cardiovascular procedures  Risk Factors  The patient has history of HTN  Clinical  Right Pressure:  124/79 mm Hg, Left Pressure:  121/74 mm Hg      FINDINGS:    Right Impression  PSV  EDV (cm/s)  Direction of Flow  Dist  ICA                 74          18  Mid  ICA                  68          21  Prox  ICA    1 - 49%      67          25  Dist CCA                  98          27  Mid CCA                  107          25  Prox CCA                 120          25  Ext Carotid               94          12  Prox Vert                 43          11  Antegrade  Subclavian               111           0    Left         Impression  PSV  EDV (cm/s)  Direction of Flow  Dist  ICA                 80          22  Mid  ICA                  64          22  Prox  ICA    1 - 49%      85          31  Dist CCA                 106          28  Mid CCA                  109          21  Prox CCA                 145          36  Ext Carotid               95          14  Prox Vert                 85          23  Antegrade  Subclavian               106           0        CONCLUSION:  Impression  RIGHT:  There is <50% stenosis noted in the internal carotid artery  Plaque is  homogenous and smooth  Vertebral artery flow is antegrade and at its greatest diameter measures 0 55  cm  There is no significant subclavian artery disease  LEFT:  There is <50% stenosis noted in the internal carotid artery  Plaque is  homogenous and smooth  Vertebral artery flow is antegrade and at its greatest diameter measuring 0 60  cm  There is no significant subclavian artery disease  Internal carotid artery stenosis determination by consensus criteria from:  Luiz Rose et al  Carotid Artery Stenosis: Gray-Scale and Doppler US Diagnosis  - Society of Radiologists in [address] [place] [address] [address], Radiology 2003;  [phone]      SIGNATURE:  Electronically Signed by: Cecil James MD, RPVI on 2021-05-19 03:39:52 PM     --------------------------------------------------------------------------------  Diagnoses and all orders for this visit:    Dilated aortic root ([address] )  -     Cancel: CTA chest wo w contrast; Future  -     CTA chest wo w contrast; Future       ======================================================    Past Medical History:   Diagnosis Date    Abnormal blood chemistry     last assessed: 5/1/2013    Abnormal blood sugar     last assessed: 12/18/2014    Allergic rhinitis     last assessed: 11/8/2013    Basal cell carcinoma      Face    Cancer ([facility] )     Diabetes mellitus ([facility] )     last assessed: 4/30/2013    Low back pain     Nodule of finger of right hand     last assessed: 10/19/2015    Obstruction of right eustachian tube     last assessed: 10/11/2016     Past Surgical History:   Procedure Laterality Date    CATARACT EXTRACTION, BILATERAL      COLONOSCOPY  1999    1 polyp    COLONOSCOPY  2007    Hyperplastic polyp    COLONOSCOPY  2009     And 2013 Normal    COLONOSCOPY  12/29/2017    15 mm polyp proximal sigmoid colon-tubular adenoma  Diverticulosis by Dr Marek Mane           Medications  Current Outpatient Medications   Medication Sig Dispense Refill    aspirin 81 mg chewable tablet Chew 81 mg daily      cholecalciferol (VITAMIN D3) 1,000 units tablet Take 1 tablet by mouth daily      clobetasol (TEMOVATE) 0 05 % ointment APPLY TWICE A DAY FOR 2 WEEKS, THEN AS NEEDED FLARES  1    Ivermectin (SOOLANTRA) 1 % CREA Apply topically daily as needed      Multiple Vitamins-Minerals (CENTRUM SILVER 50+MEN PO) Take 1 tablet by mouth daily        tamsulosin (FLOMAX) 0 4 mg TAKE 1 CAPSULE BY MOUTH EVERY DAY WITH DINNER 90 capsule 3    Psyllium 48 57 % POWD Take 1 packet by mouth daily       No current facility-administered medications for this visit          Allergies   Allergen Reactions    Doxycycline     Amoxicillin Diarrhea    Prednisone Palpitations     Medrol Dose pack ok     Sulfamethoxazole-Trimethoprim      Septra       Social History     Socioeconomic History    Marital status: /Civil Union     Spouse name: Not on file    Number of children: Not on file    Years of education: Not on file    Highest education level: Not on file   Occupational History    Occupation: RETIRED-   Tobacco Use    Smoking status: Never Smoker    Smokeless tobacco: Never Used   Vaping Use    Vaping Use: Never used   Substance and Sexual Activity    Alcohol use: Not Currently     Comment: occasionally      Drug use: No    Sexual activity: Not on file   Other Topics Concern    Not on file   Social History Narrative    DOES NOT CONSUME CAFFEINE     Social Determinants of Health     Financial Resource Strain: Not on file   Food Insecurity: Not on file   Transportation Needs: Not on file   Physical Activity: Not on file   Stress: Not on file   Social Connections: Not on file   Intimate Partner Violence: Not on file   Housing Stability: Not on file        Family History   Problem Relation Age of Onset    No Known Problems Mother     No Known Problems Father     Colon cancer Son 43    Colon cancer Maternal Grandmother        Lab Results   Component Value Date    WBC 4 79 10/15/2021    HGB 13 0 10/15/2021    HCT 39 9 10/15/2021    MCV 97 10/15/2021     10/15/2021      Lab Results   Component Value Date    SODIUM 140 10/15/2021    K 4 1 10/15/2021     (H) 10/15/2021    CO2 28 10/15/2021    BUN 9 10/15/2021    CREATININE 0 94 10/15/2021    GLUC 94 03/06/2019    CALCIUM 9 3 10/15/2021      Lab Results   Component Value Date    HGBA1C 5 7 (H) 08/02/2021      Lab Results   Component Value Date    CHOL 157 07/06/2015    CHOL 172 07/02/2014     Lab Results   Component Value Date    HDL 56 08/02/2021    HDL 51 07/14/2020    HDL 50 07/16/2019     Lab Results   Component Value Date    LDLCALC 77 08/02/2021    LDLCALC 88 07/14/2020    LDLCALC 90 07/16/2019     Lab Results   Component Value Date    TRIG 89 08/02/2021    TRIG 67 07/14/2020    TRIG 75 07/16/2019     No results found for: CHOLHDL   No results found for: INR, PROTIME       Patient Active Problem List    Diagnosis Date Noted    Palpitations 02/09/2022    Rectal bleeding 10/08/2021    Vasovagal syncope 10/08/2021    Chronic pain of right thumb 08/09/2021    Nonintractable headache 05/12/2021    Chronic bilateral low back pain without sciatica 07/31/2020    Chronic pain syndrome 07/31/2020    Spinal stenosis of lumbar region without neurogenic claudication     Radiculopathy, lumbar region     Left thigh pain 03/12/2020    Cough 03/12/2020    Impaired glucose tolerance 02/25/2020    Lateral epicondylitis of right elbow 12/17/2019    Primary cutaneous anaplastic large cell B-cell lymphoma ([address] ) 11/03/2019    Cervical strain 09/11/2019    Benign prostatic hyperplasia with urinary hesitancy 03/30/2019    Arthritis 07/20/2018    Abnormal blood sugar 01/11/2016    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

## 2022-04-27 ENCOUNTER — OFFICE VISIT (OUTPATIENT)
Dept: PODIATRY | Facility: CLINIC | Age: 77
End: 2022-04-27
Payer: MEDICARE

## 2022-04-27 VITALS
SYSTOLIC BLOOD PRESSURE: 130 MMHG | BODY MASS INDEX: 30.94 KG/M2 | HEIGHT: 71 IN | DIASTOLIC BLOOD PRESSURE: 70 MMHG | WEIGHT: 221 LBS

## 2022-04-27 DIAGNOSIS — M77.41 METATARSALGIA OF RIGHT FOOT: Primary | ICD-10-CM

## 2022-04-27 PROCEDURE — 99202 OFFICE O/P NEW SF 15 MIN: CPT | Performed by: PODIATRIST

## 2022-04-27 NOTE — PROGRESS NOTES
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 interspace right foot in 2010  He has been comfortable until last month when he felt the lump when walking barefoot on the bottom of his foot  He is not feeling a lump so much today at this visit  He notes he does not feel it in his shoe  He is concerned that the neuroma could be recurring  The following portions of the patient's history were reviewed and updated as appropriate: allergies, current medications, past family history, past medical history, past social history, past surgical history and problem list     Review of Systems   Gastrointestinal: Negative  Musculoskeletal: Negative  Skin: Negative  Neurological: Negative  Psychiatric/Behavioral: Negative  Objective:      /70   Ht 5' 11 26" (1 81 m)   Wt 100 kg (221 lb)   BMI 30 60 kg/m²          Physical Exam  Constitutional:       Appearance: Normal appearance  Cardiovascular:      Pulses: Normal pulses  Musculoskeletal:         General: No tenderness  Comments: No palpable soft tissue mass noted plantar aspect right foot at the 2nd metatarsal interspace  Each metatarsal head is palpable  Skin:     General: Skin is warm  Neurological:      General: No focal deficit present  Mental Status: He is oriented to person, place, and time        Comments: Mild numbness between right 2nd and 3rd toes consistent with neuroma excision from 2010

## 2022-05-25 ENCOUNTER — OFFICE VISIT (OUTPATIENT)
Dept: URGENT CARE | Facility: CLINIC | Age: 77
End: 2022-05-25
Payer: MEDICARE

## 2022-05-25 VITALS
SYSTOLIC BLOOD PRESSURE: 130 MMHG | OXYGEN SATURATION: 96 % | TEMPERATURE: 97 F | HEART RATE: 80 BPM | BODY MASS INDEX: 31.5 KG/M2 | WEIGHT: 225 LBS | HEIGHT: 71 IN | RESPIRATION RATE: 16 BRPM | DIASTOLIC BLOOD PRESSURE: 76 MMHG

## 2022-05-25 DIAGNOSIS — J01.90 ACUTE SINUSITIS, RECURRENCE NOT SPECIFIED, UNSPECIFIED LOCATION: Primary | ICD-10-CM

## 2022-05-25 PROCEDURE — 99213 OFFICE O/P EST LOW 20 MIN: CPT | Performed by: PHYSICIAN ASSISTANT

## 2022-05-25 PROCEDURE — G0463 HOSPITAL OUTPT CLINIC VISIT: HCPCS | Performed by: PHYSICIAN ASSISTANT

## 2022-05-25 RX ORDER — AZITHROMYCIN 250 MG/1
TABLET, FILM COATED ORAL
Qty: 6 TABLET | Refills: 0 | Status: SHIPPED | OUTPATIENT
Start: 2022-05-25 | End: 2022-05-29

## 2022-05-25 NOTE — PROGRESS NOTES
[id number]        NAME: Tessy Brown is a 65 y o  male  : 1945    MRN: [id number]  DATE: May 25, 2022  TIME: 9:05 AM    /76   Pulse 80   Temp (!) 97 °F (36 1 °C) (Tympanic)   Resp 16   Ht 5' 11 28" (1 811 m)   Wt 102 kg (225 lb)   SpO2 96%   BMI 31 14 kg/m²     Assessment and Plan   Acute sinusitis, recurrence not specified, unspecified location [J01 90]  1  Acute sinusitis, recurrence not specified, unspecified location  azithromycin (ZITHROMAX) 250 mg tablet         Patient Instructions       Follow up with PCP in 3-5 days  Proceed to  ER if symptoms worsen  Chief Complaint     Chief Complaint   Patient presents with    Cold Like Symptoms     Pt c/o head congestion, post-nasal drip, cough due to drip, and left ear clogged and muffled sounds as of yesterday  Denies fever  No OTC meds taken for symptoms  History of Present Illness       Pt with nasal congestion and ear pressure for several days       Review of Systems   Review of Systems   Constitutional: Negative  HENT: Positive for congestion, sinus pressure and sinus pain  Eyes: Negative  Respiratory: Negative  Cardiovascular: Negative  Gastrointestinal: Negative  Endocrine: Negative  Genitourinary: Negative  Musculoskeletal: Negative  Skin: Negative  Allergic/Immunologic: Negative  Neurological: Negative  Hematological: Negative  Psychiatric/Behavioral: Negative  All other systems reviewed and are negative          Current Medications       Current Outpatient Medications:     azithromycin (ZITHROMAX) 250 mg tablet, Take 2 tablets today then 1 tablet daily x 4 days, Disp: 6 tablet, Rfl: 0    aspirin 81 mg chewable tablet, Chew 81 mg daily, Disp: , Rfl:     cholecalciferol (VITAMIN D3) 1,000 units tablet, Take 1 tablet by mouth daily, Disp: , Rfl:     clobetasol (TEMOVATE) 0 05 % ointment, APPLY TWICE A DAY FOR 2 WEEKS, THEN AS NEEDED FLARES, Disp: , Rfl: 1    Ivermectin (SOOLANTRA) 1 % CREA, Apply topically daily as needed, Disp: , Rfl:     Multiple Vitamins-Minerals (CENTRUM SILVER 50+MEN PO), Take 1 tablet by mouth daily  , Disp: , Rfl:     tamsulosin (FLOMAX) 0 4 mg, TAKE 1 CAPSULE BY MOUTH EVERY DAY WITH DINNER, Disp: 90 capsule, Rfl: 3    Current Allergies     Allergies as of 05/25/2022 - Reviewed 05/25/2022   Allergen Reaction Noted    Doxycycline  08/27/2020    Amoxicillin Diarrhea 10/15/2012    Prednisone Palpitations 11/06/2019    Sulfamethoxazole-trimethoprim  08/27/2020            The following portions of the patient's history were reviewed and updated as appropriate: allergies, current medications, past family history, past medical history, past social history, past surgical history and problem list      Past Medical History:   Diagnosis Date    Abnormal blood chemistry     last assessed: 5/1/2013    Abnormal blood sugar     last assessed: 12/18/2014    Allergic rhinitis     last assessed: 11/8/2013    Basal cell carcinoma      Face    [facility] [address]     Diabetes mellitus ([facility] )     last assessed: 4/30/2013    Low back pain     Nodule of finger of right hand     last assessed: 10/19/2015    Obstruction of right eustachian tube     last assessed: 10/11/2016       Past Surgical History:   Procedure Laterality Date    CATARACT EXTRACTION, BILATERAL      COLONOSCOPY  1999 1 polyp    COLONOSCOPY  2007    Hyperplastic polyp    COLONOSCOPY  2009     And 2013 Normal    COLONOSCOPY  12/29/2017    15 mm polyp proximal sigmoid colon-tubular adenoma  Diverticulosis by Dr Dino Diaz         Family History   Problem Relation Age of Onset    No Known Problems Mother     No Known Problems Father     Colon cancer Son 43    Colon cancer Maternal Grandmother          Medications have been verified          Objective   /76   Pulse 80   Temp (!) 97 °F (36 1 °C) (Tympanic)   Resp 16   Ht 5' 11 28" (1 811 m)   Wt 102 kg (225 lb) SpO2 96%   BMI 31 14 kg/m²        Physical Exam     Physical Exam  Vitals and nursing note reviewed  Constitutional:       Appearance: Normal appearance  He is normal weight  Comments: Pt declines covid and flu testing    HENT:      Head: Normocephalic and atraumatic  Right Ear: Tympanic membrane, ear canal and external ear normal       Left Ear: Tympanic membrane, ear canal and external ear normal       Nose: Congestion and rhinorrhea present  Comments: Boggy mucosa yellow nasal d/c      Mouth/Throat:      Mouth: Mucous membranes are moist       Pharynx: Oropharynx is clear  Eyes:      Extraocular Movements: Extraocular movements intact  Conjunctiva/sclera: Conjunctivae normal       Pupils: Pupils are equal, round, and reactive to light  Cardiovascular:      Rate and Rhythm: Normal rate and regular rhythm  Pulses: Normal pulses  Heart sounds: Normal heart sounds  Pulmonary:      Effort: Pulmonary effort is normal       Breath sounds: Normal breath sounds  Abdominal:      General: Abdomen is flat  Bowel sounds are normal       Palpations: Abdomen is soft  Musculoskeletal:         General: Normal range of motion  Cervical back: Normal range of motion and neck supple  Skin:     General: Skin is warm  Capillary Refill: Capillary refill takes less than 2 seconds  Neurological:      General: No focal deficit present  Mental Status: He is alert and oriented to person, place, and time     Psychiatric:         Mood and Affect: Mood normal          Behavior: Behavior normal

## 2022-08-03 ENCOUNTER — APPOINTMENT (OUTPATIENT)
Dept: LAB | Facility: CLINIC | Age: 77
End: 2022-08-03
Payer: MEDICARE

## 2022-08-03 DIAGNOSIS — R73.09 ABNORMAL BLOOD SUGAR: ICD-10-CM

## 2022-08-03 DIAGNOSIS — Z12.5 SCREENING FOR PROSTATE CANCER: ICD-10-CM

## 2022-08-03 DIAGNOSIS — I10 BENIGN ESSENTIAL HYPERTENSION: ICD-10-CM

## 2022-08-03 DIAGNOSIS — Z13.220 SCREENING CHOLESTEROL LEVEL: ICD-10-CM

## 2022-08-03 LAB
ALBUMIN SERPL BCP-MCNC: 3.7 G/DL (ref 3.5–5)
ALP SERPL-CCNC: 50 U/L (ref 46–116)
ALT SERPL W P-5'-P-CCNC: 25 U/L (ref 12–78)
ANION GAP SERPL CALCULATED.3IONS-SCNC: 2 MMOL/L (ref 4–13)
AST SERPL W P-5'-P-CCNC: 19 U/L (ref 5–45)
BASOPHILS # BLD AUTO: 0.02 THOUSANDS/ΜL (ref 0–0.1)
BASOPHILS NFR BLD AUTO: 0 % (ref 0–1)
BILIRUB SERPL-MCNC: 0.7 MG/DL (ref 0.2–1)
BUN SERPL-MCNC: 12 MG/DL (ref 5–25)
CALCIUM SERPL-MCNC: 9.1 MG/DL (ref 8.3–10.1)
CHLORIDE SERPL-SCNC: 109 MMOL/L (ref 96–108)
CHOLEST SERPL-MCNC: 139 MG/DL
CO2 SERPL-SCNC: 29 MMOL/L (ref 21–32)
CREAT SERPL-MCNC: 1.06 MG/DL (ref 0.6–1.3)
EOSINOPHIL # BLD AUTO: 0.36 THOUSAND/ΜL (ref 0–0.61)
EOSINOPHIL NFR BLD AUTO: 6 % (ref 0–6)
ERYTHROCYTE [DISTWIDTH] IN BLOOD BY AUTOMATED COUNT: 14.6 % (ref 11.6–15.1)
GFR SERPL CREATININE-BSD FRML MDRD: 66 ML/MIN/1.73SQ M
GLUCOSE P FAST SERPL-MCNC: 94 MG/DL (ref 65–99)
HCT VFR BLD AUTO: 47.8 % (ref 36.5–49.3)
HDLC SERPL-MCNC: 46 MG/DL
HGB BLD-MCNC: 15.2 G/DL (ref 12–17)
IMM GRANULOCYTES # BLD AUTO: 0.01 THOUSAND/UL (ref 0–0.2)
IMM GRANULOCYTES NFR BLD AUTO: 0 % (ref 0–2)
LDLC SERPL CALC-MCNC: 77 MG/DL (ref 0–100)
LYMPHOCYTES # BLD AUTO: 1.48 THOUSANDS/ΜL (ref 0.6–4.47)
LYMPHOCYTES NFR BLD AUTO: 25 % (ref 14–44)
MCH RBC QN AUTO: 30.6 PG (ref 26.8–34.3)
MCHC RBC AUTO-ENTMCNC: 31.8 G/DL (ref 31.4–37.4)
MCV RBC AUTO: 96 FL (ref 82–98)
MONOCYTES # BLD AUTO: 0.84 THOUSAND/ΜL (ref 0.17–1.22)
MONOCYTES NFR BLD AUTO: 14 % (ref 4–12)
NEUTROPHILS # BLD AUTO: 3.32 THOUSANDS/ΜL (ref 1.85–7.62)
NEUTS SEG NFR BLD AUTO: 55 % (ref 43–75)
NRBC BLD AUTO-RTO: 0 /100 WBCS
PLATELET # BLD AUTO: 185 THOUSANDS/UL (ref 149–390)
PMV BLD AUTO: 9.4 FL (ref 8.9–12.7)
POTASSIUM SERPL-SCNC: 4.2 MMOL/L (ref 3.5–5.3)
PROT SERPL-MCNC: 7.4 G/DL (ref 6.4–8.4)
PSA SERPL-MCNC: 4.8 NG/ML (ref 0–4)
RBC # BLD AUTO: 4.97 MILLION/UL (ref 3.88–5.62)
SODIUM SERPL-SCNC: 140 MMOL/L (ref 135–147)
TRIGL SERPL-MCNC: 79 MG/DL
TSH SERPL DL<=0.05 MIU/L-ACNC: 1.13 UIU/ML (ref 0.45–4.5)
WBC # BLD AUTO: 6.03 THOUSAND/UL (ref 4.31–10.16)

## 2022-08-03 PROCEDURE — 36415 COLL VENOUS BLD VENIPUNCTURE: CPT

## 2022-08-03 PROCEDURE — 85025 COMPLETE CBC W/AUTO DIFF WBC: CPT

## 2022-08-03 PROCEDURE — 84443 ASSAY THYROID STIM HORMONE: CPT

## 2022-08-03 PROCEDURE — G0103 PSA SCREENING: HCPCS

## 2022-08-03 PROCEDURE — 83036 HEMOGLOBIN GLYCOSYLATED A1C: CPT

## 2022-08-03 PROCEDURE — 80061 LIPID PANEL: CPT

## 2022-08-03 PROCEDURE — 80053 COMPREHEN METABOLIC PANEL: CPT

## 2022-08-04 LAB
EST. AVERAGE GLUCOSE BLD GHB EST-MCNC: 123 MG/DL
HBA1C MFR BLD: 5.9 %

## 2022-08-09 ENCOUNTER — RA CDI HCC (OUTPATIENT)
Dept: OTHER | Facility: HOSPITAL | Age: 77
End: 2022-08-09

## 2022-08-09 PROBLEM — R97.20 ELEVATED PSA: Status: ACTIVE | Noted: 2022-08-09

## 2022-08-09 NOTE — PROGRESS NOTES
[address]  coding opportunities       Chart reviewed, no opportunity found: CHART REVIEWED, NO OPPORTUNITY FOUND        Patients Insurance     Medicare Insurance: Medicare

## 2022-08-15 ENCOUNTER — OFFICE VISIT (OUTPATIENT)
Dept: FAMILY MEDICINE CLINIC | Facility: CLINIC | Age: 77
End: 2022-08-15
Payer: MEDICARE

## 2022-08-15 VITALS
HEIGHT: 71 IN | DIASTOLIC BLOOD PRESSURE: 62 MMHG | BODY MASS INDEX: 32.06 KG/M2 | OXYGEN SATURATION: 97 % | WEIGHT: 229 LBS | TEMPERATURE: 98 F | SYSTOLIC BLOOD PRESSURE: 118 MMHG | HEART RATE: 84 BPM

## 2022-08-15 DIAGNOSIS — R97.20 ELEVATED PSA: ICD-10-CM

## 2022-08-15 DIAGNOSIS — R39.11 BENIGN PROSTATIC HYPERPLASIA WITH URINARY HESITANCY: ICD-10-CM

## 2022-08-15 DIAGNOSIS — Z00.00 ENCOUNTER FOR ANNUAL WELLNESS EXAM IN MEDICARE PATIENT: Primary | ICD-10-CM

## 2022-08-15 DIAGNOSIS — R73.09 ABNORMAL BLOOD SUGAR: ICD-10-CM

## 2022-08-15 DIAGNOSIS — N40.1 BENIGN PROSTATIC HYPERPLASIA WITH URINARY HESITANCY: ICD-10-CM

## 2022-08-15 DIAGNOSIS — I10 BENIGN ESSENTIAL HYPERTENSION: ICD-10-CM

## 2022-08-15 DIAGNOSIS — C85.80: ICD-10-CM

## 2022-08-15 DIAGNOSIS — M48.061 SPINAL STENOSIS OF LUMBAR REGION WITHOUT NEUROGENIC CLAUDICATION: ICD-10-CM

## 2022-08-15 PROBLEM — M77.11 LATERAL EPICONDYLITIS OF RIGHT ELBOW: Status: RESOLVED | Noted: 2019-12-17 | Resolved: 2022-08-15

## 2022-08-15 PROBLEM — R73.02 IMPAIRED GLUCOSE TOLERANCE: Status: RESOLVED | Noted: 2020-02-25 | Resolved: 2022-08-15

## 2022-08-15 PROBLEM — M79.652 LEFT THIGH PAIN: Status: RESOLVED | Noted: 2020-03-12 | Resolved: 2022-08-15

## 2022-08-15 PROCEDURE — G0439 PPPS, SUBSEQ VISIT: HCPCS | Performed by: FAMILY MEDICINE

## 2022-08-15 PROCEDURE — 99214 OFFICE O/P EST MOD 30 MIN: CPT | Performed by: FAMILY MEDICINE

## 2022-08-15 NOTE — PROGRESS NOTES
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[address] )     Has been stable  Continue regular follow-up with his dermatologist         Benign prostatic hyperplasia with urinary hesitancy     Symptoms have been stable since starting tamsulosin 0 4 mg daily         Spinal stenosis of lumbar region without neurogenic claudication     Status post  spinal decompression surgery 2020  Still with some daily pain, but overall considerably improved  Elevated PSA     PSA from  was 4 8, prior 3 2  Patient does not have any new urinary symptoms  Patient does have family history of prostate cancer  Will repeat PSA in 1-2 months  If still elevated, will send to urology         Relevant Orders    PSA Total, Diagnostic      Other Visit Diagnoses     Encounter for annual wellness exam in Medicare patient    -  Primary      Patient had [place] vaccination  Patient had Pneumovax  Patient had Shingrix  Discussed tetanus booster (last ) he will be getting at pharmacy      Next week      Will call in 1-2 months with repeat PSA results  Return to office in:  6 months, fasting blood work prior (CMP/A1c)    Chief Complaint     Chief Complaint   Patient presents with    Medicare Wellness Visit       History of Present Illness     Patient presents for recheck chronic medical problems  Patient does continued to complain of overall mild fatigue, otherwise doing well    Still being followed by Cardiology and Dermatology      The following portions of the patient's history were reviewed and updated as appropriate: allergies, current medications, past family history, past medical history, past social history, past surgical history and problem list     Review of Systems   Review of Systems   Constitutional: Positive for fatigue  Respiratory: Negative  Cardiovascular: Negative  Gastrointestinal: Negative  Genitourinary: Negative  Active Problem List     Patient Active Problem List   Diagnosis    Benign essential hypertension    Abnormal blood sugar    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(HCC)    Arthritis    Benign prostatic hyperplasia with urinary hesitancy    Cervical strain    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)    Cough    Radiculopathy, lumbar region    Spinal stenosis of lumbar region without neurogenic claudication    Chronic bilateral low back pain without sciatica    Chronic pain syndrome    Nonintractable headache    Chronic pain of right thumb    Rectal bleeding    Vasovagal syncope    Palpitations    Elevated PSA       Objective   /62 (BP Location: Left arm, Patient Position: Sitting, Cuff Size: Standard)   Pulse 84   Temp 98 °F (36 7 °C) (Tympanic)   Ht 5' 11 28" (1 811 m)   Wt 104 kg (229 lb)   SpO2 97%   BMI 31 69 kg/m²     Physical Exam  Cardiovascular:      Rate and Rhythm: Normal rate and regular rhythm  Heart sounds: Normal heart sounds  Comments: Carotids: no bruits  Ext: no edema  Pulmonary:      Effort: Pulmonary effort is normal  No respiratory distress  Breath sounds: No wheezing or rales  Psychiatric:         Behavior: Behavior normal          Thought Content:  Thought content normal          Pertinent Laboratory/Diagnostic Studies:  labs 8/3    Current Medications     Current Outpatient Medications:     aspirin 81 mg chewable tablet, Chew 81 mg daily, Disp: , Rfl:     cholecalciferol (VITAMIN D3) 1,000 units tablet, Take 1 tablet by mouth daily, Disp: , Rfl:     clobetasol (TEMOVATE) 0 05 % ointment, APPLY TWICE A DAY FOR 2 WEEKS, THEN AS NEEDED FLARES, Disp: , Rfl: 1    Ivermectin 1 % CREA, Apply topically daily as needed, Disp: , Rfl:     Multiple Vitamins-Minerals (CENTRUM SILVER 50+MEN PO), Take 1 tablet by mouth daily  , Disp: , Rfl:     tamsulosin (FLOMAX) 0 4 mg, TAKE 1 CAPSULE BY MOUTH EVERY DAY WITH DINNER, Disp: 90 capsule, Rfl: 3    Health Maintenance     Health Maintenance   Topic Date Due    Hepatitis C Screening  Never done    BMI: Followup Plan  01/29/2021    Influenza Vaccine (1) 09/01/2022    Fall Risk  08/15/2023    Depression Screening  08/15/2023    Medicare Annual Wellness Visit (AWV)  08/15/2023    BMI: Adult  08/15/2023    Colorectal Cancer Screening  02/25/2027    Pneumococcal Vaccine: 65+ Years  Completed    COVID-19 Vaccine  Completed    HIB Vaccine  Aged Out    Hepatitis B Vaccine  Aged Out    IPV Vaccine  Aged Out    Hepatitis A Vaccine  Aged Out    Meningococcal ACWY Vaccine  Aged Out    HPV Vaccine  Aged Out     Immunization History   Administered Date(s) Administered    COVID-19 MODERNA VACC 0 5 ML IM 01/11/2021, 02/08/2021    COVID-19, unspecified 10/22/2021, 04/01/2022    INFLUENZA 09/22/2015, 09/19/2016, 09/15/2018, 09/24/2021    Influenza Split High Dose Preservative Free IM 10/10/2014, 09/22/2015, 09/19/2016, 08/31/2017    Influenza, high dose seasonal 0 7 mL 09/26/2019, 10/14/2020    Influenza, seasonal, injectable 09/14/2012    Pneumococcal Conjugate 13-Valent 07/09/2015    Pneumococcal Polysaccharide PPV23 11/01/2006, 01/29/2020    Tdap 09/01/2010    Zoster 07/18/2008    Zoster Vaccine Recombinant 04/15/2019, 06/17/2019       Teresa Munson DO  [facility]

## 2022-08-15 NOTE — ASSESSMENT & PLAN NOTE
Status post  spinal decompression surgery September 2020  Still with some daily pain, but overall considerably improved

## 2022-08-15 NOTE — PATIENT INSTRUCTIONS
Medicare Preventive Visit Patient Instructions  Thank you for completing your Welcome to Medicare Visit or Medicare Annual Wellness Visit today  Your next wellness visit will be due in one year (8/16/2023)  The screening/preventive services that you may require over the next 5-10 years are detailed below  Some tests may not apply to you based off risk factors and/or age  Screening tests ordered at today's visit but not completed yet may show as past due  Also, please note that scanned in results may not display below  Preventive Screenings:  Service Recommendations Previous Testing/Comments   Colorectal Cancer Screening  · Colonoscopy    · Fecal Occult Blood Test (FOBT)/Fecal Immunochemical Test (FIT)  · Fecal DNA/Cologuard Test  · Flexible Sigmoidoscopy Age: 39-70 years old   Colonoscopy: every 10 years (May be performed more frequently if at higher risk)  OR  FOBT/FIT: every 1 year  OR  Cologuard: every 3 years  OR  Sigmoidoscopy: every 5 years  Screening may be recommended earlier than age 39 if at higher risk for colorectal cancer  Also, an individualized decision between you and your healthcare provider will decide whether screening between the ages of 74-80 would be appropriate   Colonoscopy: 02/25/2022  FOBT/FIT: Not on file  Cologuard: 02/15/2021  Sigmoidoscopy: Not on file    Screening Current     Prostate Cancer Screening Individualized decision between patient and health care provider in men between ages of 53-78   Medicare will cover every 12 months beginning on the day after your 50th birthday PSA: 4 8 ng/mL     Screening Not Indicated     Hepatitis C Screening Once for adults born between 1945 and 1965  More frequently in patients at high risk for Hepatitis C Hep C Antibody: Not on file        Diabetes Screening 1-2 times per year if you're at risk for diabetes or have pre-diabetes Fasting glucose: 94 mg/dL (8/3/2022)  A1C: 5 9 % (8/3/2022)  Screening Current   Cholesterol Screening Once every 5 years if you don't have a lipid disorder  May order more often based on risk factors  Lipid panel: 08/03/2022  Screening Current      Other Preventive Screenings Covered by Medicare:  1  Abdominal Aortic Aneurysm (AAA) Screening: covered once if your at risk  You're considered to be at risk if you have a family history of AAA or a male between the age of 73-68 who smoking at least 100 cigarettes in your lifetime  2  Lung Cancer Screening: covers low dose CT scan once per year if you meet all of the following conditions: (1) Age 50-69; (2) No signs or symptoms of lung cancer; (3) Current smoker or have quit smoking within the last 15 years; (4) You have a tobacco smoking history of at least 20 pack years (packs per day x number of years you smoked); (5) You get a written order from a healthcare provider  3  Glaucoma Screening: covered annually if you're considered high risk: (1) You have diabetes OR (2) Family history of glaucoma OR (3)  aged 48 and older OR (3)  American aged 72 and older  3  Osteoporosis Screening: covered every 2 years if you meet one of the following conditions: (1) Have a vertebral abnormality; (2) On glucocorticoid therapy for more than 3 months; (3) Have primary hyperparathyroidism; (4) On osteoporosis medications and need to assess response to drug therapy  5  HIV Screening: covered annually if you're between the age of 12-76  Also covered annually if you are younger than 13 and older than 72 with risk factors for HIV infection  For pregnant patients, it is covered up to 3 times per pregnancy      Immunizations:  Immunization Recommendations   Influenza Vaccine Annual influenza vaccination during flu season is recommended for all persons aged >= 6 months who do not have contraindications   Pneumococcal Vaccine   * Pneumococcal conjugate vaccine = PCV13 (Prevnar 13), PCV15 (Vaxneuvance), PCV20 (Prevnar 20)  * Pneumococcal polysaccharide vaccine = PPSV23 (Pneumovax) Adults 25-60 years old: 1-3 doses may be recommended based on certain risk factors  Adults 72 years old: 1-2 doses may be recommended based off what pneumonia vaccine you previously received   Hepatitis B Vaccine 3 dose series if at intermediate or high risk (ex: diabetes, end stage renal disease, liver disease)   Tetanus (Td) Vaccine - COST NOT COVERED BY MEDICARE PART B Following completion of primary series, a booster dose should be given every 10 years to maintain immunity against tetanus  Td may also be given as tetanus wound prophylaxis  Tdap Vaccine - COST NOT COVERED BY MEDICARE PART B Recommended at least once for all adults  For pregnant patients, recommended with each pregnancy  Shingles Vaccine (Shingrix) - COST NOT COVERED BY MEDICARE PART B  2 shot series recommended in those aged 48 and above     Health Maintenance Due:      Topic Date Due    Hepatitis C Screening  Never done    Colorectal Cancer Screening  02/25/2027     Immunizations Due:      Topic Date Due    Influenza Vaccine (1) 09/01/2022     Advance Directives   What are advance directives? Advance directives are legal documents that state your wishes and plans for medical care  These plans are made ahead of time in case you lose your ability to make decisions for yourself  Advance directives can apply to any medical decision, such as the treatments you want, and if you want to donate organs  What are the types of advance directives? There are many types of advance directives, and each state has rules about how to use them  You may choose a combination of any of the following:  · Living will: This is a written record of the treatment you want  You can also choose which treatments you do not want, which to limit, and which to stop at a certain time  This includes surgery, medicine, IV fluid, and tube feedings  · Durable power of  for healthcare [facility]):   This is a written record that states who you want to make healthcare choices for you when you are unable to make them for yourself  This person, called a proxy, is usually a family member or a friend  You may choose more than 1 proxy  · Do not resuscitate (DNR) order:  A DNR order is used in case your heart stops beating or you stop breathing  It is a request not to have certain forms of treatment, such as CPR  A DNR order may be included in other types of advance directives  · Medical directive: This covers the care that you want if you are in a coma, near death, or unable to make decisions for yourself  You can list the treatments you want for each condition  Treatment may include pain medicine, surgery, blood transfusions, dialysis, IV or tube feedings, and a ventilator (breathing machine)  · Values history: This document has questions about your views, beliefs, and how you feel and think about life  This information can help others choose the care that you would choose  Why are advance directives important? An advance directive helps you control your care  Although spoken wishes may be used, it is better to have your wishes written down  Spoken wishes can be misunderstood, or not followed  Treatments may be given even if you do not want them  An advance directive may make it easier for your family to make difficult choices about your care  Weight Management   Why it is important to manage your weight:  Being overweight increases your risk of health conditions such as heart disease, high blood pressure, type 2 diabetes, and certain types of cancer  It can also increase your risk for osteoarthritis, sleep apnea, and other respiratory problems  Aim for a slow, steady weight loss  Even a small amount of weight loss can lower your risk of health problems  How to lose weight safely:  A safe and healthy way to lose weight is to eat fewer calories and get regular exercise   You can lose up about 1 pound a week by decreasing the number of calories you eat by 500 calories each day    Healthy meal plan for weight management:  A healthy meal plan includes a variety of foods, contains fewer calories, and helps you stay healthy  A healthy meal plan includes the following:  · Eat whole-grain foods more often  A healthy meal plan should contain fiber  Fiber is the part of grains, fruits, and vegetables that is not broken down by your body  Whole-grain foods are healthy and provide extra fiber in your diet  Some examples of whole-grain foods are whole-wheat breads and pastas, oatmeal, brown rice, and bulgur  · Eat a variety of vegetables every day  Include dark, leafy greens such as spinach, kale, faheem greens, and mustard greens  Eat yellow and orange vegetables such as carrots, sweet potatoes, and winter squash  · Eat a variety of fruits every day  Choose fresh or canned fruit (canned in its own juice or light syrup) instead of juice  Fruit juice has very little or no fiber  · Eat low-fat dairy foods  Drink fat-free (skim) milk or 1% milk  Eat fat-free yogurt and low-fat cottage cheese  Try low-fat cheeses such as mozzarella and other reduced-fat cheeses  · Choose meat and other protein foods that are low in fat  Choose beans or other legumes such as split peas or lentils  Choose fish, skinless poultry (chicken or turkey), or lean cuts of red meat (beef or pork)  Before you cook meat or poultry, cut off any visible fat  · Use less fat and oil  Try baking foods instead of frying them  Add less fat, such as margarine, sour cream, regular salad dressing and mayonnaise to foods  Eat fewer high-fat foods  Some examples of high-fat foods include french fries, doughnuts, ice cream, and cakes  · Eat fewer sweets  Limit foods and drinks that are high in sugar  This includes candy, cookies, regular soda, and sweetened drinks  Exercise:  Exercise at least 30 minutes per day on most days of the week  Some examples of exercise include walking, biking, dancing, and swimming   You can also fit in more physical activity by taking the stairs instead of the elevator or parking farther away from stores  Ask your healthcare provider about the best exercise plan for you  © Copyright [employer] 2018 Information is for End User's use only and may not be sold, redistributed or otherwise used for commercial purposes   All illustrations and images included in CareNotes® are the copyrighted property of [employer]  or [address] [place] [address]

## 2022-08-15 NOTE — ASSESSMENT & PLAN NOTE
PSA from August 3rd was 4 8, prior 3 2  Patient does not have any new urinary symptoms  Patient does have family history of prostate cancer  Will repeat PSA in 1-2 months    If still elevated, will send to urology

## 2022-08-15 NOTE — PROGRESS NOTES
Assessment and Plan:   Medicare wellness visit    Problem List Items Addressed This Visit     Benign essential hypertension    Abnormal blood sugar    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(HCC)    Benign prostatic hyperplasia with urinary hesitancy    Spinal stenosis of lumbar region without neurogenic claudication    Elevated PSA      Other Visit Diagnoses     Encounter for annual wellness exam in Medicare patient    -  Primary           Preventive health issues were discussed with patient, and age appropriate screening tests were ordered as noted in patient's After Visit Summary  Personalized health advice and appropriate referrals for health education or preventive services given if needed, as noted in patient's After Visit Summary       History of Present Illness:     Patient presents for a Medicare Wellness Visit    HPI   Patient Care Team:  DO geraldine Juarez PCP - General     Review of Systems:     Review of Systems     Problem List:     Patient Active Problem List   Diagnosis    Benign essential hypertension    Abnormal blood sugar    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(HCC)    Arthritis    Benign prostatic hyperplasia with urinary hesitancy    Cervical strain    Lateral epicondylitis of right elbow    Impaired glucose tolerance    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)    Left thigh pain    Cough    Radiculopathy, lumbar region    Spinal stenosis of lumbar region without neurogenic claudication    Chronic bilateral low back pain without sciatica    Chronic pain syndrome    Nonintractable headache    Chronic pain of right thumb    Rectal bleeding    Vasovagal syncope    Palpitations    Elevated PSA      Past Medical and Surgical History:     Past Medical History:   Diagnosis Date    Abnormal blood chemistry     last assessed: 5/1/2013    Abnormal blood sugar     last assessed: 12/18/2014    Allergic rhinitis     last assessed: 11/8/2013    Basal cell carcinoma      Face    Cancer ([facility] )     Diabetes mellitus ([facility] )     last assessed: 4/30/2013    Low back pain     Nodule of finger of right hand     last assessed: 10/19/2015    Obstruction of right eustachian tube     last assessed: 10/11/2016     Past Surgical History:   Procedure Laterality Date    CATARACT EXTRACTION, BILATERAL      COLONOSCOPY  1999    1 polyp    COLONOSCOPY  2007    Hyperplastic polyp    COLONOSCOPY  2009     And 2013 Normal    COLONOSCOPY  12/29/2017    15 mm polyp proximal sigmoid colon-tubular adenoma  Diverticulosis by Dr Niki Vergara        Family History:     Family History   Problem Relation Age of Onset    No Known Problems Mother     No Known Problems Father     Colon cancer Son 43    Colon cancer Maternal Grandmother       Social History:     Social History     Socioeconomic History    Marital status: /Civil Union     Spouse name: None    Number of children: None    Years of education: None    Highest education level: None   Occupational History    Occupation: RETIRED-   Tobacco Use    Smoking status: Never Smoker    Smokeless tobacco: Never Used   Vaping Use    Vaping Use: Never used   Substance and Sexual Activity    Alcohol use: Not Currently     Comment: occasionally      Drug use: No    Sexual activity: None   Other Topics Concern    None   Social History Narrative    DOES NOT CONSUME CAFFEINE     Social Determinants of Health     Financial Resource Strain: Not on file   Food Insecurity: Not on file   Transportation Needs: Not on file   Physical Activity: Not on file   Stress: Not on file   Social Connections: Not on file   Intimate Partner Violence: Not on file   Housing Stability: Not on file      Medications and Allergies:     Current Outpatient Medications   Medication Sig Dispense Refill    aspirin 81 mg chewable tablet Chew 81 mg daily      cholecalciferol (VITAMIN D3) 1,000 units tablet Take 1 tablet by mouth daily      clobetasol (TEMOVATE) 0 05 % ointment APPLY TWICE A DAY FOR 2 WEEKS, THEN AS NEEDED FLARES  1    Ivermectin 1 % CREA Apply topically daily as needed      Multiple Vitamins-Minerals (CENTRUM SILVER 50+MEN PO) Take 1 tablet by mouth daily        tamsulosin (FLOMAX) 0 4 mg TAKE 1 CAPSULE BY MOUTH EVERY DAY WITH DINNER 90 capsule 3     No current facility-administered medications for this visit  Allergies   Allergen Reactions    Doxycycline     Amoxicillin Diarrhea    Prednisone Palpitations     Medrol Dose pack ok     Sulfamethoxazole-Trimethoprim      Septra      Immunizations:     Immunization History   Administered Date(s) Administered    COVID-19 MODERNA VACC 0 5 ML IM 01/11/2021, 02/08/2021    COVID-19, unspecified 10/22/2021, 04/01/2022    INFLUENZA 09/22/2015, 09/19/2016, 09/15/2018, 09/24/2021    Influenza Split High Dose Preservative Free IM 10/10/2014, 09/22/2015, 09/19/2016, 08/31/2017    Influenza, high dose seasonal 0 7 mL 09/26/2019, 10/14/2020    Influenza, seasonal, injectable 09/14/2012    Pneumococcal Conjugate 13-Valent 07/09/2015    Pneumococcal Polysaccharide PPV23 11/01/2006, 01/29/2020    Tdap 09/01/2010    Zoster 07/18/2008    Zoster Vaccine Recombinant 04/15/2019, 06/17/2019      Health Maintenance:         Topic Date Due    Hepatitis C Screening  Never done    Colorectal Cancer Screening  02/25/2027         Topic Date Due    Influenza Vaccine (1) 09/01/2022      Medicare Screening Tests and Risk Assessments:     Richard Mcleod is here for his Subsequent Wellness visit  Health Risk Assessment:   Patient rates overall health as very good  Patient feels that their physical health rating is same  Patient is very satisfied with their life  Eyesight was rated as same  Hearing was rated as same  Patient feels that their emotional and mental health rating is same  Patients states they are never, rarely angry  Patient states they are sometimes unusually tired/fatigued   Pain experienced in the last 7 days has been some  Patient's pain rating has been 3/10  Patient states that he has experienced no weight loss or gain in last 6 months  Depression Screening:   PHQ-2 Score: 0      Fall Risk Screening: In the past year, patient has experienced: no history of falling in past year      Home Safety:  Patient does not have trouble with stairs inside or outside of their home  Patient has working smoke alarms and has no working carbon monoxide detector  Home safety hazards include: none  Nutrition:   Current diet is Regular and Limited junk food  Medications:   Patient is not currently taking any over-the-counter supplements  Patient is able to manage medications  Activities of Daily Living (ADLs)/Instrumental Activities of Daily Living (IADLs):   Walk and transfer into and out of bed and chair?: Yes  Dress and groom yourself?: Yes    Bathe or shower yourself?: Yes    Feed yourself? Yes  Do your laundry/housekeeping?: Yes  Manage your money, pay your bills and track your expenses?: Yes  Make your own meals?: Yes    Do your own shopping?: Yes    Previous Hospitalizations:   Any hospitalizations or ED visits within the last 12 months?: No      Advance Care Planning:   Living will: Yes    Durable POA for healthcare:  Yes    Advanced directive: Yes    Five wishes given: Yes      PREVENTIVE SCREENINGS      Cardiovascular Screening:    General: Screening Current      Diabetes Screening:     General: Screening Current      Colorectal Cancer Screening:     General: Screening Current      Prostate Cancer Screening:    General: Screening Not Indicated      Osteoporosis Screening:    General: Risks and Benefits Discussed      Abdominal Aortic Aneurysm (AAA) Screening:        General: Risks and Benefits Discussed      Lung Cancer Screening:     General: Screening Not Indicated      Hepatitis C Screening:    General: Risks and Benefits Discussed    Screening, Brief Intervention, and Referral to Treatment (SBIRT)    Screening  Typical number of drinks in a day: 0  Typical number of drinks in a week: 0  Interpretation: Low risk drinking behavior  AUDIT-C Screenin) How often did you have a drink containing alcohol in the past year? monthly or less  2) How many drinks did you have on a typical day when you were drinking in the past year?  1 to 2  3) How often did you have 6 or more drinks on one occasion in the past year? never    AUDIT-C Score: 1  Interpretation: Score 0-3 (male): Negative screen for alcohol misuse    Single Item Drug Screening:  How often have you used an illegal drug (including marijuana) or a prescription medication for non-medical reasons in the past year? never    Single Item Drug Screen Score: 0  Interpretation: Negative screen for possible drug use disorder    No exam data present     Physical Exam:     /62 (BP Location: Left arm, Patient Position: Sitting, Cuff Size: Standard)   Pulse 84   Temp 98 °F (36 7 °C) (Tympanic)   Ht 5' 11 28" (1 811 m)   Wt 104 kg (229 lb)   SpO2 97%   BMI 31 69 kg/m²     Physical Exam     Kentrell Esqueda DO

## 2022-09-28 ENCOUNTER — APPOINTMENT (OUTPATIENT)
Dept: LAB | Facility: MEDICAL CENTER | Age: 77
End: 2022-09-28
Payer: MEDICARE

## 2022-09-28 DIAGNOSIS — R73.09 ABNORMAL BLOOD SUGAR: ICD-10-CM

## 2022-09-28 DIAGNOSIS — R97.20 ELEVATED PSA: ICD-10-CM

## 2022-09-28 LAB — PSA SERPL-MCNC: 4 NG/ML (ref 0–4)

## 2022-09-28 PROCEDURE — 84153 ASSAY OF PSA TOTAL: CPT

## 2022-09-29 DIAGNOSIS — R73.09 ABNORMAL BLOOD SUGAR: ICD-10-CM

## 2022-09-29 DIAGNOSIS — R97.20 ELEVATED PSA: Primary | ICD-10-CM

## 2022-09-30 DIAGNOSIS — R73.09 ABNORMAL BLOOD SUGAR: Primary | ICD-10-CM

## 2022-09-30 DIAGNOSIS — R97.20 ELEVATED PSA: ICD-10-CM

## 2022-10-25 ENCOUNTER — TELEPHONE (OUTPATIENT)
Dept: CARDIOLOGY CLINIC | Facility: CLINIC | Age: 77
End: 2022-10-25

## 2022-10-25 DIAGNOSIS — Z01.818 ENCOUNTER FOR PREADMISSION TESTING: Primary | ICD-10-CM

## 2022-10-25 NOTE — TELEPHONE ENCOUNTER
Pt calling is scheduled for CT which was recommended by Dr Omero Yoder on 11/14/22 and needs lab order placed for BMP prior to having testing done placed order for BMP for patient

## 2022-11-09 ENCOUNTER — APPOINTMENT (OUTPATIENT)
Dept: LAB | Facility: CLINIC | Age: 77
End: 2022-11-09

## 2022-11-09 LAB
ANION GAP SERPL CALCULATED.3IONS-SCNC: 3 MMOL/L (ref 4–13)
BUN SERPL-MCNC: 12 MG/DL (ref 5–25)
CALCIUM SERPL-MCNC: 9.2 MG/DL (ref 8.3–10.1)
CHLORIDE SERPL-SCNC: 109 MMOL/L (ref 96–108)
CO2 SERPL-SCNC: 28 MMOL/L (ref 21–32)
CREAT SERPL-MCNC: 0.98 MG/DL (ref 0.6–1.3)
GFR SERPL CREATININE-BSD FRML MDRD: 73 ML/MIN/1.73SQ M
GLUCOSE P FAST SERPL-MCNC: 106 MG/DL (ref 65–99)
POTASSIUM SERPL-SCNC: 3.9 MMOL/L (ref 3.5–5.3)
SODIUM SERPL-SCNC: 140 MMOL/L (ref 135–147)

## 2022-11-14 ENCOUNTER — HOSPITAL ENCOUNTER (OUTPATIENT)
Dept: CT IMAGING | Facility: HOSPITAL | Age: 77
Discharge: HOME | End: 2022-11-14

## 2022-11-14 ENCOUNTER — TELEPHONE (OUTPATIENT)
Dept: CARDIOLOGY CLINIC | Facility: CLINIC | Age: 77
End: 2022-11-14

## 2022-11-14 DIAGNOSIS — I77.810 DILATED AORTIC ROOT (HCC): ICD-10-CM

## 2022-11-14 RX ADMIN — IOHEXOL 100 ML: 350 INJECTION, SOLUTION INTRAVENOUS at 07:02

## 2022-11-14 NOTE — TELEPHONE ENCOUNTER
[facility]  [facility]---Dr Jacy ellis texted

## 2022-11-22 DIAGNOSIS — K86.9 PANCREATIC LESION: ICD-10-CM

## 2022-11-22 DIAGNOSIS — N28.9 KIDNEY LESION, NATIVE, LEFT: ICD-10-CM

## 2022-12-08 ENCOUNTER — OFFICE VISIT (OUTPATIENT)
Dept: URGENT CARE | Facility: CLINIC | Age: 77
End: 2022-12-08

## 2022-12-08 VITALS
RESPIRATION RATE: 18 BRPM | TEMPERATURE: 97.4 F | DIASTOLIC BLOOD PRESSURE: 62 MMHG | SYSTOLIC BLOOD PRESSURE: 120 MMHG | HEART RATE: 73 BPM | OXYGEN SATURATION: 98 %

## 2022-12-08 DIAGNOSIS — S61.012A LACERATION OF LEFT THUMB WITHOUT FOREIGN BODY WITHOUT DAMAGE TO NAIL, INITIAL ENCOUNTER: Primary | ICD-10-CM

## 2022-12-08 NOTE — PROGRESS NOTES
[facility]        NAME: Salome Isbell is a 65 y o  male  : 1945    MRN: [id number]  DATE: 2022  TIME: 4:14 PM    Assessment and Plan   Laceration of left thumb without foreign body without damage to nail, initial encounter [S61 012A]  1  Laceration of left thumb without foreign body without damage to nail, initial encounter          Glue applied -   Bleeding stopped   Pt tolerated well  Patient Instructions     Follow up with PCP in 3-5 days  Proceed to  ER if symptoms worsen  Chief Complaint     Chief Complaint   Patient presents with   • Laceration     Cut left thumb on a knife while cutting bagel         History of Present Illness   Salome Isbell presents to the clinic c/o    Was cutting a bagel at 1100   serated knife slipped and cut his thumb  Cleaned it and applied pressure -   Can not get the bleeding to stop   He is utd with td   He takes a daily baby aspirin       Review of Systems   Review of Systems   All other systems reviewed and are negative          Current Medications     Long-Term Medications   Medication Sig Dispense Refill   • cholecalciferol (VITAMIN D3) 1,000 units tablet Take 1 tablet by mouth daily     • clobetasol (TEMOVATE) 0 05 % ointment APPLY TWICE A DAY FOR 2 WEEKS, THEN AS NEEDED FLARES  1   • tamsulosin (FLOMAX) 0 4 mg TAKE 1 CAPSULE BY MOUTH EVERY DAY WITH DINNER 90 capsule 3       Current Allergies     Allergies as of 2022 - Reviewed 08/15/2022   Allergen Reaction Noted   • Doxycycline  2020   • Amoxicillin Diarrhea 10/15/2012   • Prednisone Palpitations 2019   • Sulfamethoxazole-trimethoprim  2020            The following portions of the patient's history were reviewed and updated as appropriate: allergies, current medications, past family history, past medical history, past social history, past surgical history and problem list     Objective   /62   Pulse 73   Temp (!) 97 4 °F (36 3 °C) (Tympanic)   Resp 18   SpO2 98%          Physical Exam     Physical Exam  Vitals and nursing note reviewed  Constitutional:       Appearance: Normal appearance  He is well-developed  HENT:      Head: Normocephalic and atraumatic  Eyes:      General: Lids are normal       Conjunctiva/sclera: Conjunctivae normal       Pupils: Pupils are equal, round, and reactive to light  Cardiovascular:      Rate and Rhythm: Normal rate and regular rhythm  Heart sounds: Normal heart sounds, S1 normal and S2 normal    Pulmonary:      Effort: Pulmonary effort is normal       Breath sounds: Normal breath sounds  Musculoskeletal:      Left hand: Laceration present  Hands:       Comments: Tiny superficial v shaped laceration   Skin:     General: Skin is warm and dry  Neurological:      Mental Status: He is alert  Psychiatric:         Speech: Speech normal          Behavior: Behavior normal          Thought Content: Thought content normal          Judgment: Judgment normal            Laceration repair    Date/Time: 12/8/2022 4:17 PM  Performed by: LAURA Pandya  Authorized by: LAURA Pandya   Consent: Verbal consent obtained  Risks and benefits: risks, benefits and alternatives were discussed  Consent given by: patient  Patient understanding: patient states understanding of the procedure being performed  Patient consent: the patient's understanding of the procedure matches consent given  Procedure consent: procedure consent matches procedure scheduled  Relevant documents: relevant documents present and verified  Test results: test results available and properly labeled  Site marked: the operative site was marked  Radiology Images displayed and confirmed   If images not available, report reviewed: imaging studies available  Required items: required blood products, implants, devices, and special equipment available  Patient identity confirmed: verbally with patient  Time out: Immediately prior to procedure a "time out" was called to verify the correct patient, procedure, equipment, support staff and site/side marked as required  Body area: upper extremity  Location details: left thumb  Laceration length: 0 5 cm  Foreign bodies: no foreign bodies  Tendon involvement: none  Nerve involvement: none  Vascular damage: no    Sedation:  Patient sedated: no      Wound Dehiscence:  Superficial Wound Dehiscence: simple closure      Procedure Details:  Preparation: Patient was prepped and draped in the usual sterile fashion    Irrigation solution: saline  Amount of cleaning: standard  Debridement: none  Degree of undermining: none  Skin closure: glue  Approximation: close  Approximation difficulty: simple  Dressing: 4x4 sterile gauze  Patient tolerance: patient tolerated the procedure well with no immediate complications

## 2022-12-08 NOTE — PATIENT INSTRUCTIONS
Skin Adhesive Care   WHAT YOU NEED TO KNOW:   Skin adhesive is medical glue used to close wounds  It is a substitute for staples and stitches  Skin adhesive wound closures take less time and do not require anesthesia  You have less pain and a lower risk of infection than with staples or stitches  Skin adhesive will fall off after the wound is healed  DISCHARGE INSTRUCTIONS:   Self-care:   Keep your wound clean and dry  for 1 to 5 days  You can shower 24 hours after the skin adhesive is applied  Lightly pat your wound dry after you shower  Do not soak  your wound in water, such as in a bath or hot tub  Do not scrub  your wound or pick at the adhesive  This can make your wound reopen  Do not apply ointments  to your wound  These include antibiotic and other ointments that contain petroleum jelly  These products will remove skin adhesive and reopen your wound  Follow up with your doctor as directed:  Write down your questions so you remember to ask them during your visits  Contact your healthcare provider if:   You have a fever  Your wound is red and warm to touch  You have questions or concerns about your condition or care  Seek care immediately if:   Your wound has fluid draining from it  Your wound opens  © Copyright [employer] 2022 Information is for End User's use only and may not be sold, redistributed or otherwise used for commercial purposes  All illustrations and images included in CareNotes® are the copyrighted property of [employer]  or Augie Manrique  The above information is an  only  It is not intended as medical advice for individual conditions or treatments  Talk to your doctor, nurse or pharmacist before following any medical regimen to see if it is safe and effective for you

## 2022-12-14 ENCOUNTER — OFFICE VISIT (OUTPATIENT)
Dept: CARDIOLOGY CLINIC | Facility: CLINIC | Age: 77
End: 2022-12-14

## 2022-12-14 VITALS
BODY MASS INDEX: 32.37 KG/M2 | HEART RATE: 77 BPM | WEIGHT: 231.2 LBS | DIASTOLIC BLOOD PRESSURE: 70 MMHG | SYSTOLIC BLOOD PRESSURE: 140 MMHG | HEIGHT: 71 IN

## 2022-12-14 DIAGNOSIS — I10 BENIGN ESSENTIAL HYPERTENSION: Primary | ICD-10-CM

## 2022-12-14 DIAGNOSIS — R00.2 PALPITATIONS: ICD-10-CM

## 2022-12-14 NOTE — PROGRESS NOTES
Cardiology   Shaune Frankel, MD Kristeen Ludwig, MD Ermelinda Shines, DO, Erasmo Liu, MD Lidia Mahajan DO, Marky Woody DO, [facility]  -------------------------------------------------------------------  [facility]  [address], [place] [address] [phone]  [phone]  12/14/22  Adalberto Hassan  YOB: 1945   MRN: [id number]      Referring Physician: Marisol Hwang DO  [address]  [facility],  [address]     HPI: Adalberto Hassan is a 65 y o  male with:   Palpitations   Frequent PVCs   Rectal bleeding   Vasovagal syncope  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  Ascending aorta dilation 4 4 cm  Grade 1 diastolic dysfunction     EF 55 60%  Holter with low PVC burden of 3 9%    The last time I saw him he states that his palpitation symptoms have significantly augmented with change in his diet alone  He had repeat CT to reevaluate his ascending aorta which showed at the level of the sinuses of V[place] it measures 4 2 cm     There is also a 1 9 cm hypodense lesion in the left upper pole of the kidney incompletely characterized as well as a fat-containing focal lesion in the pancreatic body and additional subcentimeter hypodense lesions along the pancreatic tail  He has MRI of the abdomen scheduled for January States he is feeling well he does get some symptoms of palpitations here and there and admits that his diet has not been as strict as it was in the past  Review of Systems   Constitutional: Negative for chills and fever  HENT: Negative for facial swelling and sore throat  Eyes: Negative for visual disturbance  Respiratory: Negative for cough, chest tightness, shortness of breath and wheezing  Cardiovascular: Negative for chest pain, palpitations and leg swelling  Gastrointestinal: Negative for abdominal pain, blood in stool, constipation, diarrhea, nausea and vomiting     Endocrine: Negative for cold intolerance and heat intolerance  Genitourinary: Negative for decreased urine volume, difficulty urinating, dysuria and hematuria  Musculoskeletal: Negative for arthralgias, back pain and myalgias  Skin: Negative for rash  Neurological: Negative for dizziness, syncope, weakness and numbness  Psychiatric/Behavioral: Negative for agitation, behavioral problems and confusion  The patient is not nervous/anxious  OBJECTIVE  Vitals:    12/14/22 0924   BP: 140/70   Pulse: 77       Physical Exam  Constitutional: awake, alert and oriented, in no acute distress, no obvious deformities, obese male  Head: Normocephalic, without obvious abnormality, atraumatic  Eyes: conjunctivae clear and moist  Sclera anicteric  No xanthelasmas  Pupils equal bilaterally  Extraocular motions are full  Ear nose mouth and throat: ears are symmetrical bilaterally, hearing appears to be equal bilaterally, no nasal discharge or epistaxis, oropharynx is clear with moist mucous membranes  Neck: Trachea is midline, neck is supple, no thyromegaly or significant lymphadenopathy, there is full range of motion  Lungs: clear to auscultation bilaterally, no wheezes, no rales, no rhonchi, no accessory muscle use, breathing is nonlabored  Heart: regular rate and rhythm, S1, S2 normal, no murmur, no click, no rub and no gallop, no lower extremity edema  Abdomen: Obese, soft, non-tender; bowel sounds normal; no masses, no organomegaly  Psychiatric: Patient is oriented to time, place, person, mood/affect is negative for depression, anxiety, agitation, appears to have appropriate insight  Skin: Skin is warm, dry, intact  No obvious rashes or lesions on exposed extremities  Nail beds are pink with no cyanosis or clubbing      EKG:  Results for orders placed or performed in visit on 12/14/22   POCT ECG    Impression    Sinus rhythm with fusion complexes        IMPRESSION:  Palpitations   Frequent PVCs   Rectal bleeding   Vasovagal syncope  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  Ascending aorta dilation 4 4 cm on echo, 4 2 cm on CT  Grade 1 diastolic dysfunction  1 9 cm hypodense lesion in the left upper pole of the kidney incompletely characterized as well as a fat-containing focal lesion in the pancreatic body and additional subcentimeter hypodense lesions along the pancreatic tail  He has MRI of the abdomen scheduled for January  DISCUSSION/RECOMMENDATIONS:  He presents today for follow-up  His CT shows stable size of his ascending aorta which measures 4 2 cm on CAT scan, 4 4 cm on echo  He does have some other findings on the CT as described above which are going to require further work-up, MRI is scheduled for January  His blood pressures been controlled  His symptoms of palpitations have increased a bit but he states that he has not been as strict on his diet  It seems that there is a clear correlation to his symptoms and his diet  His overall PVC burden when he had his Holter monitor was rather low however at 3 9%  For now, we will continue with 81 mg aspirin and hold off on further antiarrhythmic therapy  Should check new echocardiogram in 1 year  Stacey Parada DO, [facility]  --------------------------------------------------------------------------------  TREADMILL STRESS  No results found for this or any previous visit      ----------------------------------------------------------------------------------------------  NUCLEAR STRESS TEST: No results found for this or any previous visit  No results found for this or any previous visit       --------------------------------------------------------------------------------  CATH:  No results found for this or any previous visit     --------------------------------------------------------------------------------  ECHO:   No results found for this or any previous visit      No results found for this or any previous visit     --------------------------------------------------------------------------------  HOLTER  No results found for this or any previous visit  No results found for this or any previous visit     --------------------------------------------------------------------------------  CAROTIDS  Results for orders placed during the hospital encounter of 05/19/21    VAS carotid complete study    Narrative  [facility] REPORT  CLINICAL:  Indications:  Patient presents with to evaluate for carotid artery stenosis s/p recent  possible TIA  The patient admits to hearing a thump in the back of his head  with feelings of dizziness afterwards  Patient is currently asymptomatic  Operative History:  Patient denies any cardiovascular procedures  Risk Factors  The patient has history of HTN  Clinical  Right Pressure:  124/79 mm Hg, Left Pressure:  121/74 mm Hg  FINDINGS:    Right        Impression  PSV  EDV (cm/s)  Direction of Flow  Dist  ICA                 74          18  Mid  ICA                  68          21  Prox  ICA    1 - 49%      67          25  Dist CCA                  98          27  Mid CCA                  107          25  Prox CCA                 120          25  Ext Carotid               94          12  Prox Vert                 43          11  Antegrade  Subclavian               111           0    Left         Impression  PSV  EDV (cm/s)  Direction of Flow  Dist  ICA                 80          22  Mid  ICA                  64          22  Prox  ICA    1 - 49%      85          31  Dist CCA                 106          28  Mid CCA                  109          21  Prox CCA                 145          36  Ext Carotid               95          14  Prox Vert                 85          23  Antegrade  Subclavian               106           0        CONCLUSION:  Impression  RIGHT:  There is <50% stenosis noted in the internal carotid artery  Plaque is  homogenous and smooth    Vertebral artery flow is antegrade and at its greatest diameter measures 0 55  cm  There is no significant subclavian artery disease  LEFT:  There is <50% stenosis noted in the internal carotid artery  Plaque is  homogenous and smooth  Vertebral artery flow is antegrade and at its greatest diameter measuring 0 60  cm  There is no significant subclavian artery disease  Internal carotid artery stenosis determination by consensus criteria from:  melanie Gaston al  Carotid Artery Stenosis: Gray-Scale and Doppler US Diagnosis  - Society of Radiologists in [facility] [address], Radiology 2003;  [phone]  SIGNATURE:  Electronically Signed by: Santos Lindsay MD, RPVI on 2021-05-19 03:39:52 PM     --------------------------------------------------------------------------------  Diagnoses and all orders for this visit:    Benign essential hypertension  -     POCT ECG  -     Echo complete w/ contrast if indicated; Future    Palpitations  -     Echo complete w/ contrast if indicated; Future     ======================================================    Past Medical History:   Diagnosis Date   • Abnormal blood chemistry     last assessed: 5/1/2013   • Abnormal blood sugar     last assessed: 12/18/2014   • Allergic rhinitis     last assessed: 11/8/2013   • Basal cell carcinoma      Face   • Cancer ([facility] )    • Diabetes mellitus ([facility] )     last assessed: 4/30/2013   • Low back pain    • Nodule of finger of right hand     last assessed: 10/19/2015   • Obstruction of right eustachian tube     last assessed: 10/11/2016     Past Surgical History:   Procedure Laterality Date   • CATARACT EXTRACTION, BILATERAL     • COLONOSCOPY  1999    1 polyp   • COLONOSCOPY  2007    Hyperplastic polyp   • COLONOSCOPY  2009     And 2013 Normal   • COLONOSCOPY  12/29/2017    15 mm polyp proximal sigmoid colon-tubular adenoma    Diverticulosis by Dr Emeka Pérez   • INGUINAL HERNIA REPAIR           Medications  Current Outpatient Medications   Medication Sig Dispense Refill   • aspirin 81 mg chewable tablet Chew 81 mg daily     • cholecalciferol (VITAMIN D3) 1,000 units tablet Take 1 tablet by mouth daily     • clobetasol (TEMOVATE) 0 05 % ointment APPLY TWICE A DAY FOR 2 WEEKS, THEN AS NEEDED FLARES  1   • Ivermectin 1 % CREA Apply topically daily as needed     • Multiple Vitamins-Minerals (CENTRUM SILVER 50+MEN PO) Take 1 tablet by mouth daily       • tamsulosin (FLOMAX) 0 4 mg TAKE 1 CAPSULE BY MOUTH EVERY DAY WITH DINNER 90 capsule 3     No current facility-administered medications for this visit  Allergies   Allergen Reactions   • Doxycycline    • Amoxicillin Diarrhea   • Prednisone Palpitations     Medrol Dose pack ok    • Sulfamethoxazole-Trimethoprim      Septra       Social History     Socioeconomic History   • Marital status: /Civil Union     Spouse name: Not on file   • Number of children: Not on file   • Years of education: Not on file   • Highest education level: Not on file   Occupational History   • Occupation: RETIRED-   Tobacco Use   • Smoking status: Never   • Smokeless tobacco: Never   Vaping Use   • Vaping Use: Never used   Substance and Sexual Activity   • Alcohol use: Not Currently     Comment: occasionally     • Drug use: No   • Sexual activity: Not on file   Other Topics Concern   • Not on file   Social History Narrative    DOES NOT CONSUME CAFFEINE     Social Determinants of Health     Financial Resource Strain: Not on file   Food Insecurity: Not on file   Transportation Needs: Not on file   Physical Activity: Not on file   Stress: Not on file   Social Connections: Not on file   Intimate Partner Violence: Not on file   Housing Stability: Not on file        Family History   Problem Relation Age of Onset   • No Known Problems Mother    • No Known Problems Father    • Colon cancer Son 43   • Colon cancer Maternal Grandmother        Lab Results   Component Value Date    WBC 6 03 08/03/2022    HGB 15 2 08/03/2022    HCT 47 8 08/03/2022 MCV 96 08/03/2022     08/03/2022      Lab Results   Component Value Date    SODIUM 140 11/09/2022    K 3 9 11/09/2022     (H) 11/09/2022    CO2 28 11/09/2022    BUN 12 11/09/2022    CREATININE 0 98 11/09/2022    GLUC 94 03/06/2019    CALCIUM 9 2 11/09/2022      Lab Results   Component Value Date    HGBA1C 5 9 (H) 08/03/2022      Lab Results   Component Value Date    CHOL 157 07/06/2015    CHOL 172 07/02/2014     Lab Results   Component Value Date    HDL 46 08/03/2022    HDL 56 08/02/2021    HDL 51 07/14/2020     Lab Results   Component Value Date    LDLCALC 77 08/03/2022    LDLCALC 77 08/02/2021    LDLCALC 88 07/14/2020     Lab Results   Component Value Date    TRIG 79 08/03/2022    TRIG 89 08/02/2021    TRIG 67 07/14/2020     No results found for: CHOLHDL   No results found for: INR, PROTIME       Patient Active Problem List    Diagnosis Date Noted   • Kidney lesion, native, left 11/22/2022   • Pancreatic lesion 11/22/2022   • Elevated PSA 08/09/2022   • Palpitations 02/09/2022   • Rectal bleeding 10/08/2021   • Vasovagal syncope 10/08/2021   • Chronic pain of right thumb 08/09/2021   • Nonintractable headache 05/12/2021   • Chronic bilateral low back pain without sciatica 07/31/2020   • Chronic pain syndrome 07/31/2020   • Spinal stenosis of lumbar region without neurogenic claudication    • Radiculopathy, lumbar region    • Cough 03/12/2020   • 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   • Cervical strain 09/11/2019   • Benign prostatic hyperplasia with urinary hesitancy 03/30/2019   • Arthritis 07/20/2018   • Abnormal blood sugar 01/11/2016   • 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

## 2023-01-17 ENCOUNTER — HOSPITAL ENCOUNTER (OUTPATIENT)
Dept: MRI IMAGING | Facility: HOSPITAL | Age: 78
Discharge: HOME | End: 2023-01-17

## 2023-01-17 DIAGNOSIS — N28.9 KIDNEY LESION, NATIVE, LEFT: ICD-10-CM

## 2023-01-17 DIAGNOSIS — K86.9 PANCREATIC LESION: ICD-10-CM

## 2023-01-17 RX ADMIN — GADOBUTROL 10 ML: 604.72 INJECTION INTRAVENOUS at 08:41

## 2023-01-26 DIAGNOSIS — N40.1 BENIGN PROSTATIC HYPERPLASIA WITH URINARY HESITANCY: ICD-10-CM

## 2023-01-26 DIAGNOSIS — R39.11 BENIGN PROSTATIC HYPERPLASIA WITH URINARY HESITANCY: ICD-10-CM

## 2023-01-26 RX ORDER — TAMSULOSIN HYDROCHLORIDE 0.4 MG/1
CAPSULE ORAL
Qty: 90 CAPSULE | Refills: 3 | Status: SHIPPED | OUTPATIENT
Start: 2023-01-26

## 2023-02-07 ENCOUNTER — APPOINTMENT (OUTPATIENT)
Dept: LAB | Facility: CLINIC | Age: 78
End: 2023-02-07

## 2023-02-07 DIAGNOSIS — R97.20 ELEVATED PSA: ICD-10-CM

## 2023-02-07 DIAGNOSIS — R73.09 ABNORMAL BLOOD SUGAR: ICD-10-CM

## 2023-02-07 LAB
ALBUMIN SERPL BCP-MCNC: 3.6 G/DL (ref 3.5–5)
ALP SERPL-CCNC: 50 U/L (ref 46–116)
ALT SERPL W P-5'-P-CCNC: 30 U/L (ref 12–78)
ANION GAP SERPL CALCULATED.3IONS-SCNC: 1 MMOL/L (ref 4–13)
AST SERPL W P-5'-P-CCNC: 24 U/L (ref 5–45)
BILIRUB SERPL-MCNC: 0.67 MG/DL (ref 0.2–1)
BUN SERPL-MCNC: 12 MG/DL (ref 5–25)
CALCIUM SERPL-MCNC: 9 MG/DL (ref 8.3–10.1)
CHLORIDE SERPL-SCNC: 111 MMOL/L (ref 96–108)
CO2 SERPL-SCNC: 29 MMOL/L (ref 21–32)
CREAT SERPL-MCNC: 1.01 MG/DL (ref 0.6–1.3)
EST. AVERAGE GLUCOSE BLD GHB EST-MCNC: 117 MG/DL
GFR SERPL CREATININE-BSD FRML MDRD: 70 ML/MIN/1.73SQ M
GLUCOSE P FAST SERPL-MCNC: 105 MG/DL (ref 65–99)
HBA1C MFR BLD: 5.7 %
POTASSIUM SERPL-SCNC: 4.6 MMOL/L (ref 3.5–5.3)
PROT SERPL-MCNC: 7.3 G/DL (ref 6.4–8.4)
PSA SERPL-MCNC: 5 NG/ML (ref 0–4)
SODIUM SERPL-SCNC: 141 MMOL/L (ref 135–147)

## 2023-02-20 ENCOUNTER — OFFICE VISIT (OUTPATIENT)
Dept: FAMILY MEDICINE CLINIC | Facility: CLINIC | Age: 78
End: 2023-02-20

## 2023-02-20 VITALS
DIASTOLIC BLOOD PRESSURE: 80 MMHG | OXYGEN SATURATION: 97 % | WEIGHT: 232 LBS | HEART RATE: 88 BPM | SYSTOLIC BLOOD PRESSURE: 120 MMHG | HEIGHT: 71 IN | BODY MASS INDEX: 32.48 KG/M2 | TEMPERATURE: 97.8 F | RESPIRATION RATE: 16 BRPM

## 2023-02-20 DIAGNOSIS — M48.061 SPINAL STENOSIS OF LUMBAR REGION WITHOUT NEUROGENIC CLAUDICATION: ICD-10-CM

## 2023-02-20 DIAGNOSIS — R73.09 ABNORMAL BLOOD SUGAR: ICD-10-CM

## 2023-02-20 DIAGNOSIS — I77.810 DILATED AORTIC ROOT (HCC): ICD-10-CM

## 2023-02-20 DIAGNOSIS — L30.9 ECZEMA, UNSPECIFIED TYPE: ICD-10-CM

## 2023-02-20 DIAGNOSIS — J01.00 ACUTE NON-RECURRENT MAXILLARY SINUSITIS: ICD-10-CM

## 2023-02-20 DIAGNOSIS — R39.11 BENIGN PROSTATIC HYPERPLASIA WITH URINARY HESITANCY: ICD-10-CM

## 2023-02-20 DIAGNOSIS — C85.80: ICD-10-CM

## 2023-02-20 DIAGNOSIS — Z13.220 SCREENING CHOLESTEROL LEVEL: ICD-10-CM

## 2023-02-20 DIAGNOSIS — I10 BENIGN ESSENTIAL HYPERTENSION: ICD-10-CM

## 2023-02-20 DIAGNOSIS — N40.1 BENIGN PROSTATIC HYPERPLASIA WITH URINARY HESITANCY: ICD-10-CM

## 2023-02-20 DIAGNOSIS — K86.9 PANCREATIC LESION: Primary | ICD-10-CM

## 2023-02-20 DIAGNOSIS — R97.20 ELEVATED PSA: ICD-10-CM

## 2023-02-20 DIAGNOSIS — R41.3 MEMORY PROBLEM: ICD-10-CM

## 2023-02-20 RX ORDER — AZITHROMYCIN 250 MG/1
TABLET, FILM COATED ORAL
Qty: 6 TABLET | Refills: 0 | Status: SHIPPED | OUTPATIENT
Start: 2023-02-20 | End: 2023-02-25

## 2023-02-20 NOTE — PROGRESS NOTES
Name: Renea Brian      : [birth year]      MRN: [id number]  Encounter Provider: Yobany Guo DO  Encounter Date: 2023   Encounter department: [address] [place] [address]  Pancreatic lesion  Assessment & Plan:  Patient had MRI abdomen 2023 showing pancreatic cyst suggestive of IPMN  Will refer to surgical oncology for further evaluation/management    Orders:  -     Ambulatory Referral to Surgical Oncology; Future    2  Benign essential hypertension  Assessment & Plan:  Blood pressure controlled since discontinuing BP meds    Orders:  -     CBC and differential; Future; Expected date: 07/15/2023  -     TSH, 3rd generation with Free T4 reflex; Future; Expected date: 07/15/2023    3  Abnormal blood sugar  Assessment & Plan:  A1c 5 7%  Continue reduced carb diet and exercise  We will continue to follow    Orders:  -     Comprehensive metabolic panel; Future; Expected date: 07/15/2023  -     Hemoglobin A1C; Future; Expected date: 07/15/2023    4  Benign prostatic hyperplasia with urinary hesitancy  Assessment & Plan:  Stable on tamsulosin 0 4 mg daily      5  Elevated PSA  Assessment & Plan:  History of elevated PSA  Most recently from  was 5 0, previously 4 0, 4 8  Will refer to urology for second opinion regarding future management    Orders:  -     Ambulatory Referral to Urology; Future    6  Spinal stenosis of lumbar region without neurogenic claudication  Assessment & Plan:  Status post spinal decompression surgery 2020       7  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[facility])  Assessment & Plan:  Continue regular follow-up with his dermatologist      8  Screening cholesterol level  -     Lipid Panel with Direct LDL reflex; Future; Expected date: 07/15/2023    9  Acute non-recurrent maxillary sinusitis  Comments:  Prescription given for Z-Andrey  Also consider sinus irrigation    Orders:  -     azithromycin (ZITHROMAX) 250 mg tablet; 2 tabs on day 1, then 1 tab daily x 4 days    10  Memory problem  Assessment & Plan:  Patient complains of occasional mild memory lapses  I offered referral to geriatrics for formal testing  He declines at this time, but will let me know if his symptoms worsen      11  Dilated aortic root (HCC)    12  Eczema, unspecified type  Assessment & Plan:  Eczema lower extremities around ankles  Recommend continue Eucerin cream           Patient had [place] vaccination  Patient had flu shot this season  Patient had Pneumovax  Last tetanus booster 2022  Patient had Shingrix    6 months, fasting blood work prior  Subjective     Patient presents for recheck of chronic medical problems  He has a number of issues he would like to discuss today  Review of Systems   Respiratory: Negative  Cardiovascular: Negative  Gastrointestinal: Negative  Genitourinary: Negative  Past Medical History:   Diagnosis Date   • Abnormal blood chemistry     last assessed: 5/1/2013   • Abnormal blood sugar     last assessed: 12/18/2014   • Allergic rhinitis     last assessed: 11/8/2013   • Basal cell carcinoma      Face   • Cancer ([facility] )    • Diabetes mellitus ([facility] )     last assessed: 4/30/2013   • Low back pain    • Nodule of finger of right hand     last assessed: 10/19/2015   • Obstruction of right eustachian tube     last assessed: 10/11/2016     Past Surgical History:   Procedure Laterality Date   • CATARACT EXTRACTION, BILATERAL     • COLONOSCOPY  1999 1 polyp   • COLONOSCOPY  2007    Hyperplastic polyp   • COLONOSCOPY  2009     And 2013 Normal   • COLONOSCOPY  12/29/2017    15 mm polyp proximal sigmoid colon-tubular adenoma    Diverticulosis by Dr Sara Ladd   • INGUINAL HERNIA REPAIR       Family History   Problem Relation Age of Onset   • No Known Problems Mother    • No Known Problems Father    • Colon cancer Son 43   • Colon cancer Maternal Grandmother      Social History     Socioeconomic History   • Marital status: /Civil Union     Spouse name: None   • Number of children: None   • Years of education: None   • Highest education level: None   Occupational History   • Occupation: RETIRED-   Tobacco Use   • Smoking status: Never   • Smokeless tobacco: Never   Vaping Use   • Vaping Use: Never used   Substance and Sexual Activity   • Alcohol use: Not Currently     Comment: occasionally     • Drug use: No   • Sexual activity: None   Other Topics Concern   • None   Social History Narrative    DOES NOT CONSUME CAFFEINE     Social Determinants of Health     Financial Resource Strain: Not on file   Food Insecurity: Not on file   Transportation Needs: Not on file   Physical Activity: Not on file   Stress: Not on file   Social Connections: Not on file   Intimate Partner Violence: Not on file   Housing Stability: Not on file     Current Outpatient Medications on File Prior to Visit   Medication Sig   • aspirin 81 mg chewable tablet Chew 81 mg daily   • cholecalciferol (VITAMIN D3) 1,000 units tablet Take 1 tablet by mouth daily   • clobetasol (TEMOVATE) 0 05 % ointment APPLY TWICE A DAY FOR 2 WEEKS, THEN AS NEEDED FLARES   • Ivermectin 1 % CREA Apply topically daily as needed   • Multiple Vitamins-Minerals (CENTRUM SILVER 50+MEN PO) Take 1 tablet by mouth daily     • tamsulosin (FLOMAX) 0 4 mg TAKE 1 CAPSULE BY MOUTH EVERY DAY WITH DINNER     Allergies   Allergen Reactions   • Doxycycline    • Amoxicillin Diarrhea   • Prednisone Palpitations     Medrol Dose pack ok    • Sulfamethoxazole-Trimethoprim      Septra     Immunization History   Administered Date(s) Administered   • COVID-19 MODERNA VACC 0 5 ML IM 01/11/2021, 02/08/2021   • COVID-19, unspecified 10/22/2021, 04/01/2022   • INFLUENZA 09/22/2015, 09/19/2016, 09/15/2018, 09/24/2021   • Influenza Split High Dose Preservative Free IM 10/10/2014, 09/22/2015, 09/19/2016, 08/31/2017   • Influenza, high dose seasonal 0 7 mL 09/26/2019, 10/14/2020   • Influenza, seasonal, injectable 09/14/2012   • Pneumococcal Conjugate 13-Valent 07/09/2015   • Pneumococcal Polysaccharide PPV23 11/01/2006, 01/29/2020   • Tdap 09/01/2010, 08/12/2022   • Zoster 07/18/2008   • Zoster Vaccine Recombinant 04/15/2019, 06/17/2019       Objective     /80 (BP Location: Left arm, Patient Position: Sitting, Cuff Size: Adult)   Pulse 88   Temp 97 8 °F (36 6 °C) (Temporal)   Resp 16   Ht 5' 10 87" (1 8 m)   Wt 105 kg (232 lb)   SpO2 97%   BMI 32 48 kg/m²     Physical Exam  Cardiovascular:      Rate and Rhythm: Normal rate and regular rhythm  Heart sounds: Normal heart sounds  Comments: Carotids: no bruits  Ext: no edema  Pulmonary:      Effort: Pulmonary effort is normal  No respiratory distress  Breath sounds: No wheezing or rales  Psychiatric:         Behavior: Behavior normal          Thought Content:  Thought content normal        Elisha Benoit,

## 2023-02-20 NOTE — ASSESSMENT & PLAN NOTE
Patient complains of occasional mild memory lapses  I offered referral to geriatrics for formal testing    He declines at this time, but will let me know if his symptoms worsen

## 2023-02-20 NOTE — ASSESSMENT & PLAN NOTE
Patient had MRI abdomen January 17, 2023 showing pancreatic cyst suggestive of IPMN    Will refer to surgical oncology for further evaluation/management

## 2023-02-20 NOTE — ASSESSMENT & PLAN NOTE
History of elevated PSA  Most recently from February 7 was 5 0, previously 4 0, 4 8    Will refer to urology for second opinion regarding future management

## 2023-02-21 ENCOUNTER — TELEPHONE (OUTPATIENT)
Dept: UROLOGY | Facility: MEDICAL CENTER | Age: 78
End: 2023-02-21

## 2023-02-21 NOTE — TELEPHONE ENCOUNTER
Please Triage  New Patient    What is the reason for the patient’s appointment? Elevated PSA  What office location does the patient prefer? [place]   Imaging/Lab Results:    Do we accept the patient's insurance or is the patient Self-Pay? Insurance Provider: medicare   Plan Type/Number:  Member ID#: Has the patient had any previous Urologist(s)? No     Have patient records been requested? If not are records showing in Epic:     Has the patient had any outside testing done?in epic     Does the patient have a personal history of cancer?  No

## 2023-03-13 ENCOUNTER — OFFICE VISIT (OUTPATIENT)
Dept: UROLOGY | Facility: MEDICAL CENTER | Age: 78
End: 2023-03-13

## 2023-03-13 VITALS
DIASTOLIC BLOOD PRESSURE: 80 MMHG | HEIGHT: 70 IN | SYSTOLIC BLOOD PRESSURE: 140 MMHG | HEART RATE: 81 BPM | BODY MASS INDEX: 32.64 KG/M2 | WEIGHT: 228 LBS

## 2023-03-13 DIAGNOSIS — R97.20 ELEVATED PSA: ICD-10-CM

## 2023-03-13 NOTE — PATIENT INSTRUCTIONS
PSA repeated in about 2 weeks (no sooner)  Please avoid any ejaculation or direct pressure to the scrotum within 3 days within testing  If PSA remains elevated or has not trended down enough, would recommend MRI of the prostate for further evaluation if you were agreeable  MRI uses scale 1-5    3,4,5 show a lesion that we would recommend an MRI-fusion biopsy (we use the MRI images to guide our prostate sample)  1 is completely negative, no further work up  Would then continue annual PSA blood work  2 does NOT show a lesion, but cannot necessarily rule out prostate cancer  Would recommend either biopsy for more definitive idea of what's going on (transperineal biopsy in [place] or transrectal biopsy in the office) or 4K score blood test to give us a % likelihood that you develop prostate cancer in the next 10 years  If we do proceed with a biopsy, treatment options are dependent on level of cancer found  If PSA is trending down nicely, will likely keep a close eye with serial PSAs to make sure it remains stable

## 2023-03-13 NOTE — PROGRESS NOTES
3/13/2023      Chief Complaint   Patient presents with   • Elevated PSA         Assessment and Plan    77 y o  male new patient     1  Elevated PSA  · PSA: 5 0 (2/7/23)  · Previous PSAs: 4 0 (9/28/22), 4 8 (8/3/22), 3 2 (8/2/21), 2 8 (7/14/20), 2 7 (7/16/19), 1 6 (7/16/18)   · ALEJANDRO today revelaed smooth, enlarged prostate without firmness or nodules  · Thorough discussion today regarding options for work up  Given abrupt rise in patient's PSA, I do think starting with at least repeat PSA is reasonable  Discussed options following results including PSA surveillance, MRI/prostate biopsy, and or 4K score testing  · PSA ordered 2 weeks from today's exam  Reviewed activities to avoid 72 hours prior to testing  · Follow up pending results  2  BPH with LUTS  · Managed on Flomax for many years  · Thorough discussion regarding bladder irritants and importance of drinking daily water intake of 40 to 60 ounces  · Patient will monitor symptoms with associated dietary modifications  If symptoms persist, will arrange cystoscopy evaluation with MD      History of Present Illness  Buster Roman is a 77 y o  male here for evaluation of elevated PSA and BPH  Patient has no previous history of elevated PSA  He denies any family history of prostate cancer  He denies any exacerbating events prior to testing that may exacerbate transient elevation  He denies any perineal pain or recent infections  Denies any activities prior to testing  He does have a history of BPH and has been managed on Flomax for the past few years  He does state that it used to be more effective than it has been in the last few years  He does report intermittent sensation of incomplete bladder emptying and urinary frequency  He denies any difficulty voiding, dysuria, or gross hematuria  He denies any flank or abdominal pain  He denies any fevers or chills  He is very grateful for the information provided above    He is agreeable to plan above     Review of Systems   Constitutional: Negative for chills and fever  HENT: Negative  Respiratory: Negative  Cardiovascular: Negative  Gastrointestinal: Negative for abdominal pain, nausea and vomiting  Genitourinary: Positive for frequency  Negative for difficulty urinating, dysuria, flank pain, hematuria, scrotal swelling, testicular pain and urgency  Denies perineal pain  Intermittent sensation of incomplete bladder emptying  Nocturia few times per night   Musculoskeletal: Negative  Skin: Negative  Neurological: Negative  AUA SYMPTOM SCORE    Flowsheet Row Most Recent Value   AUA SYMPTOM SCORE    How often have you had a sensation of not emptying your bladder completely after you finished urinating? 2 (P)     How often have you had to urinate again less than two hours after you finished urinating? 3 (P)     How often have you found you stopped and started again several times when you urinate? 1 (P)     How often have you found it difficult to postpone urination? 2 (P)     How often have you had a weak urinary stream? 1 (P)     How often have you had to push or strain to begin urination? 1 (P)     How many times did you most typically get up to urinate from the time you went to bed at night until the time you got up in the morning? 4 (P)     Quality of Life: If you were to spend the rest of your life with your urinary condition just the way it is now, how would you feel about that? 3 (P)     AUA SYMPTOM SCORE 14 (P)            Vitals  Vitals:    03/13/23 1316   BP: 140/80   Pulse: 81   Weight: 103 kg (228 lb)   Height: 5' 10" (1 778 m)       Physical Exam  Vitals reviewed  Constitutional:       General: He is not in acute distress  Appearance: Normal appearance  He is obese  He is not ill-appearing, toxic-appearing or diaphoretic  HENT:      Head: Normocephalic and atraumatic     Eyes:      Conjunctiva/sclera: Conjunctivae normal    Pulmonary:      Effort: Pulmonary effort is normal  No respiratory distress  Abdominal:      General: There is no distension  Palpations: Abdomen is soft  Tenderness: There is no abdominal tenderness  There is no right CVA tenderness, left CVA tenderness, guarding or rebound  Genitourinary:     Comments: ALEJANDRO today revelaed smooth, enlarged prostate without firmness or nodules  Musculoskeletal:         General: Normal range of motion  Cervical back: Normal range of motion  Skin:     General: Skin is warm and dry  Neurological:      General: No focal deficit present  Mental Status: He is alert and oriented to person, place, and time  Psychiatric:         Mood and Affect: Mood normal          Behavior: Behavior normal          Thought Content: Thought content normal          Judgment: Judgment normal        Past History  Past Medical History:   Diagnosis Date   • Abnormal blood chemistry     last assessed: 5/1/2013   • Abnormal blood sugar     last assessed: 12/18/2014   • Allergic rhinitis     last assessed: 11/8/2013   • Basal cell carcinoma      Face   • Cancer ([address] )    • Diabetes mellitus ([facility] )     last assessed: 4/30/2013   • Low back pain    • Nodule of finger of right hand     last assessed: 10/19/2015   • Obstruction of right eustachian tube     last assessed: 10/11/2016     Social History     Socioeconomic History   • Marital status: /Civil Union     Spouse name: None   • Number of children: None   • Years of education: None   • Highest education level: None   Occupational History   • Occupation: RETIRED-   Tobacco Use   • Smoking status: Never   • Smokeless tobacco: Never   Vaping Use   • Vaping Use: Never used   Substance and Sexual Activity   • Alcohol use: Not Currently     Comment: occasionally     • Drug use: No   • Sexual activity: None   Other Topics Concern   • None   Social History Narrative    DOES NOT CONSUME CAFFEINE     Social Determinants of Health     Financial Resource Strain: Not on file Food Insecurity: Not on file   Transportation Needs: Not on file   Physical Activity: Not on file   Stress: Not on file   Social Connections: Not on file   Intimate Partner Violence: Not on file   Housing Stability: Not on file     Social History     Tobacco Use   Smoking Status Never   Smokeless Tobacco Never     Family History   Problem Relation Age of Onset   • No Known Problems Mother    • No Known Problems Father    • Colon cancer Son 43   • Colon cancer Maternal Grandmother        The following portions of the patient's history were reviewed and updated as appropriate: allergies, current medications, past medical history, past social history, past surgical history and problem list     Results  No results found for this or any previous visit (from the past 1 hour(s)) ]  Lab Results   Component Value Date    PSA 5 0 (H) 02/07/2023    PSA 4 0 09/28/2022    PSA 4 8 (H) 08/03/2022    PSA 3 2 08/02/2021     Lab Results   Component Value Date    GLUCOSE 95 07/06/2015    CALCIUM 9 0 02/07/2023     07/06/2015    K 4 6 02/07/2023    CO2 29 02/07/2023     (H) 02/07/2023    BUN 12 02/07/2023    CREATININE 1 01 02/07/2023     Lab Results   Component Value Date    WBC 6 03 08/03/2022    HGB 15 2 08/03/2022    HCT 47 8 08/03/2022    MCV 96 08/03/2022     08/03/2022     Christie Irvin PA-C

## 2023-03-17 ENCOUNTER — CONSULT (OUTPATIENT)
Dept: SURGICAL ONCOLOGY | Facility: CLINIC | Age: 78
End: 2023-03-17

## 2023-03-17 VITALS
HEART RATE: 83 BPM | WEIGHT: 228 LBS | RESPIRATION RATE: 16 BRPM | DIASTOLIC BLOOD PRESSURE: 80 MMHG | TEMPERATURE: 98.2 F | HEIGHT: 70 IN | SYSTOLIC BLOOD PRESSURE: 128 MMHG | OXYGEN SATURATION: 95 % | BODY MASS INDEX: 32.64 KG/M2

## 2023-03-17 DIAGNOSIS — K86.9 PANCREATIC LESION: Primary | ICD-10-CM

## 2023-03-17 NOTE — LETTER
March 17, 2023     Belinda Call, [address] [facility] [address]  [address] [place] [address]    Patient: Ivette Pro   YOB: 1945   Date of Visit: 3/17/2023       Dear Dr Jason Barton:    Thank you for referring Tito Lung to me for evaluation  Below are my notes for this consultation  If you have questions, please do not hesitate to call me  I look forward to following your patient along with you  Sincerely,        Candido Quintero MD        CC: DO Carlitos Edmonds MD Chancy Sheen, MD  3/17/2023  2:06 PM  Sign when Signing Visit     Surgical Oncology Follow Up       [facility] [address] [facility] [id number]    Ivette Pro  1945  [id number]  [facility] [address] [address]    Chief Complaint   Patient presents with   • Consult       Assessment/Plan:    No problem-specific Assessment & Plan notes found for this encounter  Diagnoses and all orders for this visit:    Pancreatic lesion  -     Ambulatory Referral to Surgical Oncology  -     MRI abdomen w wo contrast and mrcp; Future  -     Basic metabolic panel; Future  -     MRI abdomen w wo contrast and mrcp; Future  -     Basic metabolic panel; Future       Advance Care Planning/Advance Directives:  Discussed disease status, cancer treatment plans and/or cancer treatment goals with the patient  Oncology History    No history exists  History of Present Illness: 56-year-old man here with instantly found pancreas cysts  These were found when he underwent imaging studies for aortic root dilatation  He is completely asymptomatic from a GI standpoint  No new onset diabetes  No history of jaundice  Review of Systems:  Review of Systems   Constitutional: Negative  HENT: Negative  Eyes: Negative  Respiratory: Negative  Cardiovascular: Negative      Gastrointestinal: Negative  Negative for abdominal distention, abdominal pain, constipation and nausea  Endocrine: Negative  Genitourinary: Negative  Musculoskeletal: Negative  Skin: Negative  Allergic/Immunologic: Negative  Neurological: Negative  Hematological: Negative  Psychiatric/Behavioral: Negative  All other systems reviewed and are negative  Patient Active Problem List   Diagnosis   • Benign essential hypertension   • Abnormal blood sugar   • 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(HCC)   • Arthritis   • Benign prostatic hyperplasia with urinary hesitancy   • Cervical strain   • 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)   • Cough   • Radiculopathy, lumbar region   • Spinal stenosis of lumbar region without neurogenic claudication   • Chronic bilateral low back pain without sciatica   • Chronic pain syndrome   • Nonintractable headache   • Chronic pain of right thumb   • Rectal bleeding   • Vasovagal syncope   • Palpitations   • Elevated PSA   • Kidney lesion, native, left   • Pancreatic lesion   • Memory problem   • Dilated aortic root (HCC)   • Eczema     Past Medical History:   Diagnosis Date   • Abnormal blood chemistry     last assessed: 5/1/2013   • Abnormal blood sugar     last assessed: 12/18/2014   • Allergic rhinitis     last assessed: 11/8/2013   • Basal cell carcinoma      Face   • Cancer ([facility] )    • Diabetes mellitus ([facility] )     last assessed: 4/30/2013   • Low back pain    • Nodule of finger of right hand     last assessed: 10/19/2015   • Obstruction of right eustachian tube     last assessed: 10/11/2016     Past Surgical History:   Procedure Laterality Date   • CATARACT EXTRACTION, BILATERAL     • COLONOSCOPY  1999 1 polyp   • COLONOSCOPY  2007    Hyperplastic polyp   • COLONOSCOPY  2009     And 2013 Normal   • COLONOSCOPY  12/29/2017    15 mm polyp proximal sigmoid colon-tubular adenoma    Diverticulosis by Dr Favian Mehta   • [address]       Family History   Problem Relation Age of Onset   • No Known Problems Mother    • No Known Problems Father    • Colon cancer Maternal Grandmother         or stomach (unsure)   • Colon cancer Son 43        2x and liver     Social History     Socioeconomic History   • Marital status: /Civil Union     Spouse name: Not on file   • Number of children: Not on file   • Years of education: Not on file   • Highest education level: Not on file   Occupational History   • Occupation: RETIRED-   Tobacco Use   • Smoking status: Never   • Smokeless tobacco: Never   Vaping Use   • Vaping Use: Never used   Substance and Sexual Activity   • Alcohol use: Not Currently     Comment: occasionally     • Drug use: No   • Sexual activity: Not on file   Other Topics Concern   • Not on file   Social History Narrative    DOES NOT CONSUME CAFFEINE     Social Determinants of Health     Financial Resource Strain: Not on file   Food Insecurity: Not on file   Transportation Needs: Not on file   Physical Activity: Not on file   Stress: Not on file   Social Connections: Not on file   Intimate Partner Violence: Not on file   Housing Stability: Not on file       Current Outpatient Medications:   •  aspirin 81 mg chewable tablet, Chew 81 mg daily, Disp: , Rfl:   •  cholecalciferol (VITAMIN D3) 1,000 units tablet, Take 1 tablet by mouth daily, Disp: , Rfl:   •  clobetasol (TEMOVATE) 0 05 % ointment, APPLY TWICE A DAY FOR 2 WEEKS, THEN AS NEEDED FLARES, Disp: , Rfl: 1  •  Ivermectin 1 % CREA, Apply topically daily as needed, Disp: , Rfl:   •  Multiple Vitamins-Minerals (CENTRUM SILVER 50+MEN PO), Take 1 tablet by mouth daily  , Disp: , Rfl:   •  tamsulosin (FLOMAX) 0 4 mg, TAKE 1 CAPSULE BY MOUTH EVERY DAY WITH DINNER, Disp: 90 capsule, Rfl: 3  Allergies   Allergen Reactions   • Doxycycline    • Amoxicillin Diarrhea   • Prednisone Palpitations     Medrol Dose pack ok    • Sulfamethoxazole-Trimethoprim      Septra     Vitals:    03/17/23 1316   BP: 128/80   Pulse: 83   Resp: 16   Temp: 98 2 °F (36 8 °C)   SpO2: 95%       Physical Exam  Vitals reviewed  Constitutional:       Appearance: Normal appearance  HENT:      Head: Normocephalic and atraumatic  Right Ear: External ear normal       Left Ear: External ear normal       Nose: Nose normal    Eyes:      Extraocular Movements: Extraocular movements intact  Pupils: Pupils are equal, round, and reactive to light  Cardiovascular:      Rate and Rhythm: Normal rate and regular rhythm  Heart sounds: Normal heart sounds  Pulmonary:      Effort: Pulmonary effort is normal       Breath sounds: Normal breath sounds  Abdominal:      General: Abdomen is flat  Palpations: Abdomen is soft  Musculoskeletal:         General: Normal range of motion  Cervical back: Normal range of motion and neck supple  Skin:     General: Skin is warm and dry  Neurological:      General: No focal deficit present  Mental Status: He is alert and oriented to person, place, and time  Psychiatric:         Mood and Affect: Mood normal          Behavior: Behavior normal          Thought Content: Thought content normal          Judgment: Judgment normal            Results:  Labs:  Lab Results   Component Value Date    SODIUM 141 02/07/2023    K 4 6 02/07/2023     (H) 02/07/2023    CO2 29 02/07/2023    BUN 12 02/07/2023    CREATININE 1 01 02/07/2023    GLUC 94 03/06/2019    CALCIUM 9 0 02/07/2023         Imaging  MRI OF THE ABDOMEN WITH AND WITHOUT CONTRAST WITH MRCP     INDICATION:  Left upper pole renal lesion, lesion on pancreas  Findings on CT angiography of the chest      COMPARISON: 11/14/2022 CTA of the chest from this institution  11/18/2014 abdominopelvic CT report from [facility]    Images not available for direct comparison      TECHNIQUE:  Multiplanar/multisequence MRI of the abdomen with 3D MRCP was performed before and after administration of contrast   IV Contrast:  10 mL of Gadobutrol injection (SINGLE-DOSE)      FINDINGS:     Motion degrades images      LOWER CHEST:   Within normal limits      LIVER:   Normal signal intensity and morphology        1 5 cm nodule in the posterior right lobe, brighter than the spleen and similar to fluid signal intensity on T2-weighted images, early and persistent enhancement, follows the blood pool  Most compatible with a benign hemangioma  No suspicious mass      Patent hepatic and portal veins      BILE DUCTS:  Normal caliber and morphology      GALLBLADDER:  Within normal limits      SPLEEN:  Within normal limits      PANCREAS:    Several small, simple cysts located in the distal body and tail and pancreatic neck  Largest is 1 0 cm in the distal body (8/67)  All appear contiguous with the pancreatic duct, 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)  Proximal pancreatic duct is top normal in caliber for age  Normal duct morphology        Pancreas is otherwise within normal limits  No solid, enhancing masses      ADRENAL GLANDS:  Within normal limits      KIDNEYS/PROXIMAL URETERS:  Small cortical cysts  Largest cyst is 1 4 cm, left kidney, correlates with nodule on CT  No suspicious renal mass      BOWEL:   Within normal limits      PERITONEUM/RETROPERITONEUM:  No ascites      LYMPH NODES:  No abdominal lymphadenopathy      VASCULAR STRUCTURES:  Within normal limits      OSSEOUS STRUCTURES:  No suspicious osseous lesion      ABDOMINAL WALL:  Within normal limits      VISUALIZED PELVIC STRUCTURES: Within normal limits         IMPRESSION:     Benign renal cysts  No suspicious renal mass      Small pancreatic cysts most compatible with side branch IPMN, largest 1 5cm  Recomend follow up MRI annually x5 then every 2 years x2 per ACR criteria               I reviewed the above laboratory and imaging data      Discussion/Summary: 78-year-old man, instantly found pancreas cyst   These appear to be sidebranch IPMN, and are small in size without worrisome features  We will therefore plan on follow-up in 1 year with MRI/MRCP at that time  All questions answered  Copy of reports given for his records

## 2023-03-17 NOTE — PROGRESS NOTES
Surgical Oncology Follow Up       [address] [facility] SURGICAL ONCOLOGY [facility] [address]    Erleen Fabry  1945  [id number]  [facility] SURGICAL ONCOLOGY [place]  [place] [id number]    Chief Complaint   Patient presents with   • Consult       Assessment/Plan:    No problem-specific Assessment & Plan notes found for this encounter  Diagnoses and all orders for this visit:    Pancreatic lesion  -     Ambulatory Referral to Surgical Oncology  -     MRI abdomen w wo contrast and mrcp; Future  -     Basic metabolic panel; Future  -     MRI abdomen w wo contrast and mrcp; Future  -     Basic metabolic panel; Future        Advance Care Planning/Advance Directives:  Discussed disease status, cancer treatment plans and/or cancer treatment goals with the patient  Oncology History    No history exists  History of Present Illness: 69-year-old man here with instantly found pancreas cysts  These were found when he underwent imaging studies for aortic root dilatation  He is completely asymptomatic from a GI standpoint  No new onset diabetes  No history of jaundice  Review of Systems:  Review of Systems   Constitutional: Negative  HENT: Negative  Eyes: Negative  Respiratory: Negative  Cardiovascular: Negative  Gastrointestinal: Negative  Negative for abdominal distention, abdominal pain, constipation and nausea  Endocrine: Negative  Genitourinary: Negative  Musculoskeletal: Negative  Skin: Negative  Allergic/Immunologic: Negative  Neurological: Negative  Hematological: Negative  Psychiatric/Behavioral: Negative  All other systems reviewed and are negative        Patient Active Problem List   Diagnosis   • Benign essential hypertension   • Abnormal blood sugar   • 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(HCC)   • Arthritis   • Benign prostatic hyperplasia with urinary hesitancy   • Cervical strain   • 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)   • Cough   • Radiculopathy, lumbar region   • Spinal stenosis of lumbar region without neurogenic claudication   • Chronic bilateral low back pain without sciatica   • Chronic pain syndrome   • Nonintractable headache   • Chronic pain of right thumb   • Rectal bleeding   • Vasovagal syncope   • Palpitations   • Elevated PSA   • Kidney lesion, native, left   • Pancreatic lesion   • Memory problem   • Dilated aortic root (HCC)   • Eczema     Past Medical History:   Diagnosis Date   • Abnormal blood chemistry     last assessed: 5/1/2013   • Abnormal blood sugar     last assessed: 12/18/2014   • Allergic rhinitis     last assessed: 11/8/2013   • Basal cell carcinoma      Face   • Cancer ([facility] [address] )    • Diabetes mellitus ([facility] [address] )     last assessed: 4/30/2013   • Low back pain    • Nodule of finger of right hand     last assessed: 10/19/2015   • Obstruction of right eustachian tube     last assessed: 10/11/2016     Past Surgical History:   Procedure Laterality Date   • CATARACT EXTRACTION, BILATERAL     • COLONOSCOPY  1999    1 polyp   • COLONOSCOPY  2007    Hyperplastic polyp   • COLONOSCOPY  2009     And 2013 Normal   • COLONOSCOPY  12/29/2017    15 mm polyp proximal sigmoid colon-tubular adenoma    Diverticulosis by Dr Kamilah Recio   • INGUINAL HERNIA REPAIR       Family History   Problem Relation Age of Onset   • No Known Problems Mother    • No Known Problems Father    • Colon cancer Maternal Grandmother         or stomach (unsure)   • Colon cancer Son 43        2x and liver     Social History     Socioeconomic History   • Marital status: /Civil Union     Spouse name: Not on file   • Number of children: Not on file   • Years of education: Not on file   • Highest education level: Not on file   Occupational History   • Occupation: RETIRED-   Tobacco Use   • Smoking status: Never   • Smokeless tobacco: Never   Vaping Use   • Vaping Use: Never used   Substance and Sexual Activity   • Alcohol use: Not Currently     Comment: occasionally  • Drug use: No   • Sexual activity: Not on file   Other Topics Concern   • Not on file   Social History Narrative    DOES NOT CONSUME CAFFEINE     Social Determinants of Health     Financial Resource Strain: Not on file   Food Insecurity: Not on file   Transportation Needs: Not on file   Physical Activity: Not on file   Stress: Not on file   Social Connections: Not on file   Intimate Partner Violence: Not on file   Housing Stability: Not on file       Current Outpatient Medications:   •  aspirin 81 mg chewable tablet, Chew 81 mg daily, Disp: , Rfl:   •  cholecalciferol (VITAMIN D3) 1,000 units tablet, Take 1 tablet by mouth daily, Disp: , Rfl:   •  clobetasol (TEMOVATE) 0 05 % ointment, APPLY TWICE A DAY FOR 2 WEEKS, THEN AS NEEDED FLARES, Disp: , Rfl: 1  •  Ivermectin 1 % CREA, Apply topically daily as needed, Disp: , Rfl:   •  Multiple Vitamins-Minerals (CENTRUM SILVER 50+MEN PO), Take 1 tablet by mouth daily  , Disp: , Rfl:   •  tamsulosin (FLOMAX) 0 4 mg, TAKE 1 CAPSULE BY MOUTH EVERY DAY WITH DINNER, Disp: 90 capsule, Rfl: 3  Allergies   Allergen Reactions   • Doxycycline    • Amoxicillin Diarrhea   • Prednisone Palpitations     Medrol Dose pack ok    • Sulfamethoxazole-Trimethoprim      Septra     Vitals:    03/17/23 1316   BP: 128/80   Pulse: 83   Resp: 16   Temp: 98 2 °F (36 8 °C)   SpO2: 95%       Physical Exam  Vitals reviewed  Constitutional:       Appearance: Normal appearance  HENT:      Head: Normocephalic and atraumatic  Right Ear: External ear normal       Left Ear: External ear normal       Nose: Nose normal    Eyes:      Extraocular Movements: Extraocular movements intact  Pupils: Pupils are equal, round, and reactive to light  Cardiovascular:      Rate and Rhythm: Normal rate and regular rhythm  Heart sounds: Normal heart sounds     Pulmonary:      Effort: Pulmonary effort is normal       Breath sounds: Normal breath sounds  Abdominal:      General: Abdomen is flat  Palpations: Abdomen is soft  Musculoskeletal:         General: Normal range of motion  Cervical back: Normal range of motion and neck supple  Skin:     General: Skin is warm and dry  Neurological:      General: No focal deficit present  Mental Status: He is alert and oriented to person, place, and time  Psychiatric:         Mood and Affect: Mood normal          Behavior: Behavior normal          Thought Content: Thought content normal          Judgment: Judgment normal            Results:  Labs:  Lab Results   Component Value Date    SODIUM 141 02/07/2023    K 4 6 02/07/2023     (H) 02/07/2023    CO2 29 02/07/2023    BUN 12 02/07/2023    CREATININE 1 01 02/07/2023    GLUC 94 03/06/2019    CALCIUM 9 0 02/07/2023         Imaging  MRI OF THE ABDOMEN WITH AND WITHOUT CONTRAST WITH MRCP     INDICATION:  Left upper pole renal lesion, lesion on pancreas  Findings on CT angiography of the chest      COMPARISON: 11/14/2022 CTA of the chest from this institution  11/18/2014 abdominopelvic CT report from [address] [place] [address]  Images not available for direct comparison      TECHNIQUE:  Multiplanar/multisequence MRI of the abdomen with 3D MRCP was performed before and after administration of contrast   IV Contrast:  10 mL of Gadobutrol injection (SINGLE-DOSE)      FINDINGS:     Motion degrades images      LOWER CHEST:   Within normal limits      LIVER:   Normal signal intensity and morphology        1 5 cm nodule in the posterior right lobe, brighter than the spleen and similar to fluid signal intensity on T2-weighted images, early and persistent enhancement, follows the blood pool  Most compatible with a benign hemangioma    No suspicious mass      Patent hepatic and portal veins      BILE DUCTS:  Normal caliber and morphology      GALLBLADDER:  Within normal limits      SPLEEN:  Within normal limits      PANCREAS:    Several small, simple cysts located in the distal body and tail and pancreatic neck  Largest is 1 0 cm in the distal body (8/67)  All appear contiguous with the pancreatic duct, 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)  Proximal pancreatic duct is top normal in caliber for age  Normal duct morphology        Pancreas is otherwise within normal limits  No solid, enhancing masses      ADRENAL GLANDS:  Within normal limits      KIDNEYS/PROXIMAL URETERS:  Small cortical cysts  Largest cyst is 1 4 cm, left kidney, correlates with nodule on CT  No suspicious renal mass      BOWEL:   Within normal limits      PERITONEUM/RETROPERITONEUM:  No ascites      LYMPH NODES:  No abdominal lymphadenopathy      VASCULAR STRUCTURES:  Within normal limits      OSSEOUS STRUCTURES:  No suspicious osseous lesion      ABDOMINAL WALL:  Within normal limits      VISUALIZED PELVIC STRUCTURES: Within normal limits         IMPRESSION:     Benign renal cysts  No suspicious renal mass      Small pancreatic cysts most compatible with side branch IPMN, largest 1 5cm  Recomend follow up MRI annually x5 then every 2 years x2 per ACR criteria               I reviewed the above laboratory and imaging data  Discussion/Summary: 27-year-old man, instantly found pancreas cyst   These appear to be sidebranch IPMN, and are small in size without worrisome features  We will therefore plan on follow-up in 1 year with MRI/MRCP at that time  All questions answered  Copy of reports given for his records

## 2023-03-30 ENCOUNTER — APPOINTMENT (OUTPATIENT)
Dept: LAB | Facility: CLINIC | Age: 78
End: 2023-03-30

## 2023-03-30 LAB — PSA SERPL-MCNC: 4.4 NG/ML (ref 0–4)

## 2023-04-05 ENCOUNTER — TELEPHONE (OUTPATIENT)
Dept: UROLOGY | Facility: MEDICAL CENTER | Age: 78
End: 2023-04-05

## 2023-04-05 DIAGNOSIS — R97.20 ELEVATED PSA: Primary | ICD-10-CM

## 2023-04-05 NOTE — TELEPHONE ENCOUNTER
----- Message from Emma Babcock PA-C sent at 4/5/2023 10:19 AM EDT -----  PSA trending down  Would recommend repeat PSA in 3 months to closely monitor trend  Orders placed, thanks   Pt is aware of PSA and to have it redone in 3 months

## 2023-05-10 DIAGNOSIS — K21.9 GASTROESOPHAGEAL REFLUX DISEASE WITHOUT ESOPHAGITIS: ICD-10-CM

## 2023-05-10 DIAGNOSIS — K64.9 HEMORRHOIDS, UNSPECIFIED HEMORRHOID TYPE: Primary | ICD-10-CM

## 2023-05-10 RX ORDER — PANTOPRAZOLE SODIUM 40 MG/1
TABLET, DELAYED RELEASE ORAL
Qty: 30 TABLET | Refills: 2 | Status: SHIPPED | OUTPATIENT
Start: 2023-05-10

## 2023-05-10 NOTE — PROGRESS NOTES
Patient complains of of abdominal pain, acid taste in mouth  Symptoms for the past few weeks  Probable GERD  Trial pantoprazole 40 mg daily  If symptoms not improving in a few weeks, recommend call  We will recommend further testing or referral     Patient also complaining of possible internal hemorrhoids for the past few weeks  Bright red blood at times  Also some rectal pain  We will give trial of hydrocortisone 2 5% cream twice daily    If not improving, will refer to colorectal specialist

## 2023-05-31 ENCOUNTER — OFFICE VISIT (OUTPATIENT)
Dept: FAMILY MEDICINE CLINIC | Facility: CLINIC | Age: 78
End: 2023-05-31

## 2023-05-31 VITALS
OXYGEN SATURATION: 97 % | SYSTOLIC BLOOD PRESSURE: 130 MMHG | RESPIRATION RATE: 16 BRPM | BODY MASS INDEX: 32.93 KG/M2 | TEMPERATURE: 98.1 F | WEIGHT: 230 LBS | DIASTOLIC BLOOD PRESSURE: 80 MMHG | HEIGHT: 70 IN | HEART RATE: 85 BPM

## 2023-05-31 DIAGNOSIS — K40.90 RIGHT INGUINAL HERNIA: Primary | ICD-10-CM

## 2023-05-31 NOTE — PROGRESS NOTES
Name: Lois Patino      :       MRN: [id number]  Encounter Provider: Washington Balbuena DO  Encounter Date: 2023   Encounter department: [address] [place] [address]  Right inguinal hernia    Patient complains of mild intermittent right inguinal pain for the past few months  Appetite is good  Denies any nausea vomiting or recent bowel problems  Abdominal surgery includes previous ventral hernia repair with mesh years ago  Examination reveals mild tenderness right inguinal region  No evidence of hernia  Symptoms symptoms are mild and intermittent without clear evidence of hernia, recommend watchful observation  Consider imaging or referral to general surgery if symptoms persist or worsen  ER precautions given         Subjective     Patient complains of mild intermittent right inguinal pain for the past few months  Appetite is good  Denies any nausea vomiting or recent bowel problems  Abdominal surgery includes previous ventral hernia repair with mesh years ago  Review of Systems   Respiratory: Negative  Cardiovascular: Negative  Gastrointestinal: Positive for abdominal pain  Genitourinary: Negative  Past Medical History:   Diagnosis Date   • Abnormal blood chemistry     last assessed: 2013   • Abnormal blood sugar     last assessed: 2014   • Allergic rhinitis     last assessed: 2013   • Basal cell carcinoma      Face   • Cancer ([facility] [address] )    • Diabetes mellitus ([facility] [address] )     last assessed: 2013   • Low back pain    • Nodule of finger of right hand     last assessed: 10/19/2015   • Obstruction of right eustachian tube     last assessed: 10/11/2016     Past Surgical History:   Procedure Laterality Date   • CATARACT EXTRACTION, BILATERAL     • COLONOSCOPY  1999 polyp   • COLONOSCOPY  2007    Hyperplastic polyp   • COLONOSCOPY       And  Normal   • COLONOSCOPY  2017    15 mm polyp proximal sigmoid colon-tubular adenoma  Diverticulosis by Dr Neo Cooley   • INGUINAL HERNIA REPAIR       Family History   Problem Relation Age of Onset   • No Known Problems Mother    • No Known Problems Father    • Colon cancer Maternal Grandmother         or stomach (unsure)   • Colon cancer Son 43        2x and liver     Social History     Socioeconomic History   • Marital status: /Civil Union     Spouse name: None   • Number of children: None   • Years of education: None   • Highest education level: None   Occupational History   • Occupation: RETIRED-   Tobacco Use   • Smoking status: Never   • Smokeless tobacco: Never   Vaping Use   • Vaping Use: Never used   Substance and Sexual Activity   • Alcohol use: Not Currently     Comment: occasionally     • Drug use: No   • Sexual activity: None   Other Topics Concern   • None   Social History Narrative    DOES NOT CONSUME CAFFEINE     Social Determinants of Health     Financial Resource Strain: Not on file   Food Insecurity: Not on file   Transportation Needs: Not on file   Physical Activity: Not on file   Stress: Not on file   Social Connections: Not on file   Intimate Partner Violence: Not on file   Housing Stability: Not on file     Current Outpatient Medications on File Prior to Visit   Medication Sig   • aspirin 81 mg chewable tablet Chew 81 mg daily   • cholecalciferol (VITAMIN D3) 1,000 units tablet Take 1 tablet by mouth daily   • clobetasol (TEMOVATE) 0 05 % ointment APPLY TWICE A DAY FOR 2 WEEKS, THEN AS NEEDED FLARES   • hydrocortisone 2 5 % cream Apply topically 2 (two) times a day prn   • Ivermectin 1 % CREA Apply topically daily as needed   • Multiple Vitamins-Minerals (CENTRUM SILVER 50+MEN PO) Take 1 tablet by mouth daily     • pantoprazole (PROTONIX) 40 mg tablet 1 tab in AM   • tamsulosin (FLOMAX) 0 4 mg TAKE 1 CAPSULE BY MOUTH EVERY DAY WITH DINNER     Allergies   Allergen Reactions   • Doxycycline    • Amoxicillin Diarrhea   • Prednisone Palpitations     Medrol Dose pack ok    • "Sulfamethoxazole-Trimethoprim      Septra     Immunization History   Administered Date(s) Administered   • COVID-19 MODERNA VACC 0 5 ML IM 01/11/2021, 02/08/2021, 10/22/2021, 04/01/2022   • COVID-19, unspecified 10/22/2021, 04/01/2022   • INFLUENZA 09/22/2015, 09/19/2016, 09/15/2018, 09/24/2021   • Influenza Split High Dose Preservative Free IM 10/10/2014, 09/22/2015, 09/19/2016, 08/31/2017   • Influenza, high dose seasonal 0 7 mL 09/26/2019, 10/14/2020   • Influenza, seasonal, injectable 09/14/2012   • Pneumococcal Conjugate 13-Valent 07/09/2015   • Pneumococcal Polysaccharide PPV23 11/01/2006, 01/29/2020   • Tdap 09/01/2010, 08/12/2022   • Zoster 07/18/2008   • Zoster Vaccine Recombinant 04/15/2019, 06/17/2019       Objective     /80 (BP Location: Left arm, Patient Position: Sitting, Cuff Size: Adult)   Pulse 85   Temp 98 1 °F (36 7 °C) (Temporal)   Resp 16   Ht 5' 9 69\" (1 77 m)   Wt 104 kg (230 lb)   SpO2 97%   BMI 33 30 kg/m²     Physical Exam  Abdominal:      Comments: Mild right inguinal tenderness  No evidence of hernia         Suzette Elise,   "

## 2023-06-29 ENCOUNTER — APPOINTMENT (OUTPATIENT)
Dept: LAB | Facility: CLINIC | Age: 78
End: 2023-06-29
Payer: MEDICARE

## 2023-06-29 DIAGNOSIS — R97.20 ELEVATED PSA: ICD-10-CM

## 2023-06-29 LAB — PSA SERPL-MCNC: 4.78 NG/ML (ref 0–4)

## 2023-06-29 PROCEDURE — 84153 ASSAY OF PSA TOTAL: CPT

## 2023-06-29 PROCEDURE — 36415 COLL VENOUS BLD VENIPUNCTURE: CPT

## 2023-07-07 DIAGNOSIS — R97.20 ELEVATED PSA: Primary | ICD-10-CM

## 2023-08-14 ENCOUNTER — APPOINTMENT (OUTPATIENT)
Dept: LAB | Facility: CLINIC | Age: 78
End: 2023-08-14
Payer: MEDICARE

## 2023-08-14 DIAGNOSIS — Z13.220 SCREENING CHOLESTEROL LEVEL: ICD-10-CM

## 2023-08-14 DIAGNOSIS — R73.09 ABNORMAL BLOOD SUGAR: ICD-10-CM

## 2023-08-14 DIAGNOSIS — I10 BENIGN ESSENTIAL HYPERTENSION: ICD-10-CM

## 2023-08-14 LAB
ALBUMIN SERPL BCP-MCNC: 3.7 G/DL (ref 3.5–5)
ALP SERPL-CCNC: 47 U/L (ref 46–116)
ALT SERPL W P-5'-P-CCNC: 26 U/L (ref 12–78)
ANION GAP SERPL CALCULATED.3IONS-SCNC: 1 MMOL/L
AST SERPL W P-5'-P-CCNC: 20 U/L (ref 5–45)
BASOPHILS # BLD AUTO: 0.02 THOUSANDS/ÂΜL (ref 0–0.1)
BASOPHILS NFR BLD AUTO: 0 % (ref 0–1)
BILIRUB SERPL-MCNC: 0.95 MG/DL (ref 0.2–1)
BUN SERPL-MCNC: 12 MG/DL (ref 5–25)
CALCIUM SERPL-MCNC: 8.9 MG/DL (ref 8.3–10.1)
CHLORIDE SERPL-SCNC: 110 MMOL/L (ref 96–108)
CHOLEST SERPL-MCNC: 148 MG/DL
CO2 SERPL-SCNC: 31 MMOL/L (ref 21–32)
CREAT SERPL-MCNC: 1.01 MG/DL (ref 0.6–1.3)
EOSINOPHIL # BLD AUTO: 0.39 THOUSAND/ÂΜL (ref 0–0.61)
EOSINOPHIL NFR BLD AUTO: 7 % (ref 0–6)
ERYTHROCYTE [DISTWIDTH] IN BLOOD BY AUTOMATED COUNT: 14.1 % (ref 11.6–15.1)
EST. AVERAGE GLUCOSE BLD GHB EST-MCNC: 128 MG/DL
GFR SERPL CREATININE-BSD FRML MDRD: 70 ML/MIN/1.73SQ M
GLUCOSE P FAST SERPL-MCNC: 100 MG/DL (ref 65–99)
HBA1C MFR BLD: 6.1 %
HCT VFR BLD AUTO: 46.4 % (ref 36.5–49.3)
HDLC SERPL-MCNC: 46 MG/DL
HGB BLD-MCNC: 15.7 G/DL (ref 12–17)
IMM GRANULOCYTES # BLD AUTO: 0.01 THOUSAND/UL (ref 0–0.2)
IMM GRANULOCYTES NFR BLD AUTO: 0 % (ref 0–2)
LDLC SERPL CALC-MCNC: 81 MG/DL (ref 0–100)
LYMPHOCYTES # BLD AUTO: 1.5 THOUSANDS/ÂΜL (ref 0.6–4.47)
LYMPHOCYTES NFR BLD AUTO: 26 % (ref 14–44)
MCH RBC QN AUTO: 31.5 PG (ref 26.8–34.3)
MCHC RBC AUTO-ENTMCNC: 33.8 G/DL (ref 31.4–37.4)
MCV RBC AUTO: 93 FL (ref 82–98)
MONOCYTES # BLD AUTO: 0.67 THOUSAND/ÂΜL (ref 0.17–1.22)
MONOCYTES NFR BLD AUTO: 12 % (ref 4–12)
NEUTROPHILS # BLD AUTO: 3.12 THOUSANDS/ÂΜL (ref 1.85–7.62)
NEUTS SEG NFR BLD AUTO: 55 % (ref 43–75)
NRBC BLD AUTO-RTO: 0 /100 WBCS
PLATELET # BLD AUTO: 183 THOUSANDS/UL (ref 149–390)
PMV BLD AUTO: 9.2 FL (ref 8.9–12.7)
POTASSIUM SERPL-SCNC: 4.1 MMOL/L (ref 3.5–5.3)
PROT SERPL-MCNC: 7.4 G/DL (ref 6.4–8.4)
RBC # BLD AUTO: 4.98 MILLION/UL (ref 3.88–5.62)
SODIUM SERPL-SCNC: 142 MMOL/L (ref 135–147)
TRIGL SERPL-MCNC: 104 MG/DL
TSH SERPL DL<=0.05 MIU/L-ACNC: 1.47 UIU/ML (ref 0.45–4.5)
WBC # BLD AUTO: 5.71 THOUSAND/UL (ref 4.31–10.16)

## 2023-08-14 PROCEDURE — 84443 ASSAY THYROID STIM HORMONE: CPT

## 2023-08-14 PROCEDURE — 36415 COLL VENOUS BLD VENIPUNCTURE: CPT

## 2023-08-14 PROCEDURE — 85025 COMPLETE CBC W/AUTO DIFF WBC: CPT

## 2023-08-14 PROCEDURE — 80061 LIPID PANEL: CPT

## 2023-08-14 PROCEDURE — 83036 HEMOGLOBIN GLYCOSYLATED A1C: CPT

## 2023-08-14 PROCEDURE — 80053 COMPREHEN METABOLIC PANEL: CPT

## 2023-08-28 ENCOUNTER — OFFICE VISIT (OUTPATIENT)
Dept: FAMILY MEDICINE CLINIC | Facility: CLINIC | Age: 78
End: 2023-08-28
Payer: MEDICARE

## 2023-08-28 VITALS
DIASTOLIC BLOOD PRESSURE: 80 MMHG | WEIGHT: 230 LBS | OXYGEN SATURATION: 95 % | TEMPERATURE: 97.7 F | HEART RATE: 78 BPM | RESPIRATION RATE: 16 BRPM | SYSTOLIC BLOOD PRESSURE: 128 MMHG | BODY MASS INDEX: 32.93 KG/M2 | HEIGHT: 70 IN

## 2023-08-28 DIAGNOSIS — M25.531 RIGHT WRIST PAIN: ICD-10-CM

## 2023-08-28 DIAGNOSIS — C85.80: ICD-10-CM

## 2023-08-28 DIAGNOSIS — M48.061 SPINAL STENOSIS OF LUMBAR REGION WITHOUT NEUROGENIC CLAUDICATION: ICD-10-CM

## 2023-08-28 DIAGNOSIS — R73.09 ABNORMAL BLOOD SUGAR: Primary | ICD-10-CM

## 2023-08-28 DIAGNOSIS — K86.9 PANCREATIC LESION: ICD-10-CM

## 2023-08-28 DIAGNOSIS — R97.20 ELEVATED PSA: ICD-10-CM

## 2023-08-28 DIAGNOSIS — I10 BENIGN ESSENTIAL HYPERTENSION: ICD-10-CM

## 2023-08-28 PROBLEM — K62.5 RECTAL BLEEDING: Status: RESOLVED | Noted: 2021-10-08 | Resolved: 2023-08-28

## 2023-08-28 PROCEDURE — G0439 PPPS, SUBSEQ VISIT: HCPCS | Performed by: FAMILY MEDICINE

## 2023-08-28 PROCEDURE — 99214 OFFICE O/P EST MOD 30 MIN: CPT | Performed by: FAMILY MEDICINE

## 2023-08-28 NOTE — ASSESSMENT & PLAN NOTE
Blood pressure reasonably controlled since discontinuing blood pressure meds.   We will continue to monitor

## 2023-08-28 NOTE — PROGRESS NOTES
Name: Beti Stringer      :       MRN: [id number]  Encounter Provider: Ellen Howe DO  Encounter Date: 2023   Encounter department: [address] [place] [address]     1. Abnormal blood sugar  Assessment & Plan:  A1c from  was 6.1%, was 5.7%. Again discussed reducing carbohydrates in diet. Continue regular exercise. We will continue to monitor    Orders:  -     Comprehensive metabolic panel; Future; Expected date: 2024  -     Hemoglobin A1C; Future; Expected date: 2024    2. Benign essential hypertension  Assessment & Plan:  Blood pressure reasonably controlled since discontinuing blood pressure meds. We will continue to monitor      3. Elevated PSA  Assessment & Plan:  Most recent PSA 4.78. Being followed by urology      4. Pancreatic lesion  Assessment & Plan:  MRI abdomen 2023 showed pancreatic cyst suggestive of IPMN. Being followed by surgical oncology (next appointment 2024)      5. Spinal stenosis of lumbar region without neurogenic claudication  Assessment & Plan:  Status post spinal decompression surgery . Overall stable      6. 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[facility])  Assessment & Plan:  Continue regular follow-up with dermatology      7. Right wrist pain  Assessment & Plan:  If symptoms persist, recommend referral to orthopedics (number given). Subjective     Patient presents for recheck chronic medical problems today. He is having some issues with right wrist pain, otherwise doing well. He had labs done     Review of Systems   Respiratory: Negative. Cardiovascular: Negative. Gastrointestinal: Negative. Genitourinary: Negative.         Past Medical History:   Diagnosis Date   • Abnormal blood chemistry     last assessed: 2013   • Abnormal blood sugar     last assessed: 2014   • Allergic rhinitis     last assessed: 2013   • Basal cell carcinoma      Face   • Cancer [facility])    • Diabetes mellitus ([address])     last assessed: 4/30/2013   • Low back pain    • Nodule of finger of right hand     last assessed: 10/19/2015   • Obstruction of right eustachian tube     last assessed: 10/11/2016     Past Surgical History:   Procedure Laterality Date   • CATARACT EXTRACTION, BILATERAL     • COLONOSCOPY  1999 1 polyp   • COLONOSCOPY  2007    Hyperplastic polyp   • COLONOSCOPY  2009     And 2013 Normal   • COLONOSCOPY  12/29/2017    15 mm polyp proximal sigmoid colon-tubular adenoma. Diverticulosis by Dr. Venu Barker   • INGUINAL HERNIA REPAIR       Family History   Problem Relation Age of Onset   • No Known Problems Mother    • No Known Problems Father    • Colon cancer Maternal Grandmother         or stomach (unsure)   • Colon cancer Son 43        2x and liver     Social History     Socioeconomic History   • Marital status: /Civil Union     Spouse name: None   • Number of children: None   • Years of education: None   • Highest education level: None   Occupational History   • Occupation: RETIRED-   Tobacco Use   • Smoking status: Never   • Smokeless tobacco: Never   Vaping Use   • Vaping Use: Never used   Substance and Sexual Activity   • Alcohol use: Not Currently     Comment: occasionally. • Drug use: No   • Sexual activity: None   Other Topics Concern   • None   Social History Narrative    DOES NOT CONSUME CAFFEINE     Social Determinants of Health     Financial Resource Strain: Low Risk  (8/21/2023)    Overall Financial Resource Strain (CARDIA)    • Difficulty of Paying Living Expenses: Not very hard   Food Insecurity: Not on file   Transportation Needs: No Transportation Needs (8/21/2023)    PRAPARE - Transportation    • Lack of Transportation (Medical): No    • Lack of Transportation (Non-Medical):  No   Physical Activity: Not on file   Stress: Not on file   Social Connections: Not on file   Intimate Partner Violence: Not on file   Housing Stability: Not on file     Current Outpatient Medications on File Prior to Visit   Medication Sig   • aspirin 81 mg chewable tablet Chew 81 mg daily   • cholecalciferol (VITAMIN D3) 1,000 units tablet Take 1 tablet by mouth daily   • clobetasol (TEMOVATE) 0.05 % ointment APPLY TWICE A DAY FOR 2 WEEKS, THEN AS NEEDED FLARES   • hydrocortisone 2.5 % cream Apply topically 2 (two) times a day prn   • Ivermectin 1 % CREA Apply topically daily as needed   • Multiple Vitamins-Minerals (CENTRUM SILVER 50+MEN PO) Take 1 tablet by mouth daily     • pantoprazole (PROTONIX) 40 mg tablet 1 tab in AM   • tamsulosin (FLOMAX) 0.4 mg TAKE 1 CAPSULE BY MOUTH EVERY DAY WITH DINNER     Allergies   Allergen Reactions   • Doxycycline    • Amoxicillin Diarrhea   • Prednisone Palpitations     Medrol Dose pack ok    • Sulfamethoxazole-Trimethoprim      Septra     Immunization History   Administered Date(s) Administered   • COVID-19 MODERNA VACC 0.5 ML IM 01/11/2021, 02/08/2021, 10/22/2021, 04/01/2022   • COVID-19, unspecified 10/22/2021, 04/01/2022   • INFLUENZA 09/22/2015, 09/19/2016, 09/15/2018, 09/24/2021   • Influenza Split High Dose Preservative Free IM 10/10/2014, 09/22/2015, 09/19/2016, 08/31/2017   • Influenza, high dose seasonal 0.7 mL 09/26/2019, 10/14/2020   • Influenza, seasonal, injectable 09/14/2012   • Pneumococcal Conjugate 13-Valent 07/09/2015   • Pneumococcal Polysaccharide PPV23 11/01/2006, 01/29/2020   • Tdap 09/01/2010, 08/12/2022   • Zoster 07/18/2008   • Zoster Vaccine Recombinant 04/15/2019, 06/17/2019       Objective     /80 (BP Location: Left arm, Patient Position: Sitting, Cuff Size: Large)   Pulse 78   Temp 97.7 °F (36.5 °C) (Temporal)   Resp 16   Ht 5' 9.69" (1.77 m)   Wt 104 kg (230 lb)   SpO2 95%   BMI 33.30 kg/m²     Physical Exam  Cardiovascular:      Rate and Rhythm: Normal rate and regular rhythm. Heart sounds: Normal heart sounds.       Comments: Carotids: no bruits  Ext: no edema  Pulmonary:      Effort: Pulmonary effort is normal. No respiratory distress. Breath sounds: No wheezing or rales. Psychiatric:         Behavior: Behavior normal.         Thought Content:  Thought content normal.       Julia Jin, DO

## 2023-08-28 NOTE — PATIENT INSTRUCTIONS
Medicare Preventive Visit Patient Instructions  Thank you for completing your Welcome to Medicare Visit or Medicare Annual Wellness Visit today. Your next wellness visit will be due in one year (8/28/2024). The screening/preventive services that you may require over the next 5-10 years are detailed below. Some tests may not apply to you based off risk factors and/or age. Screening tests ordered at today's visit but not completed yet may show as past due. Also, please note that scanned in results may not display below. Preventive Screenings:  Service Recommendations Previous Testing/Comments   Colorectal Cancer Screening  · Colonoscopy    · Fecal Occult Blood Test (FOBT)/Fecal Immunochemical Test (FIT)  · Fecal DNA/Cologuard Test  · Flexible Sigmoidoscopy Age: 43-73 years old   Colonoscopy: every 10 years (May be performed more frequently if at higher risk)  OR  FOBT/FIT: every 1 year  OR  Cologuard: every 3 years  OR  Sigmoidoscopy: every 5 years  Screening may be recommended earlier than age 39 if at higher risk for colorectal cancer. Also, an individualized decision between you and your healthcare provider will decide whether screening between the ages of 77-80 would be appropriate.  Colonoscopy: 02/25/2022  FOBT/FIT: Not on file  Cologuard: 02/15/2021  Sigmoidoscopy: Not on file    Screening Current     Prostate Cancer Screening Individualized decision between patient and health care provider in men between ages of 53-66   Medicare will cover every 12 months beginning on the day after your 50th birthday PSA: 4.78 ng/mL     Screening Not Indicated     Hepatitis C Screening Once for adults born between 1945 and 1965  More frequently in patients at high risk for Hepatitis C Hep C Antibody: Not on file        Diabetes Screening 1-2 times per year if you're at risk for diabetes or have pre-diabetes Fasting glucose: 100 mg/dL (8/14/2023)  A1C: 6.1 % (8/14/2023)  Screening Current   Cholesterol Screening Once every 5 years if you don't have a lipid disorder. May order more often based on risk factors. Lipid panel: 08/14/2023  Screening Current      Other Preventive Screenings Covered by Medicare:  1. Abdominal Aortic Aneurysm (AAA) Screening: covered once if your at risk. You're considered to be at risk if you have a family history of AAA or a male between the age of 70-76 who smoking at least 100 cigarettes in your lifetime. 2. Lung Cancer Screening: covers low dose CT scan once per year if you meet all of the following conditions: (1) Age 48-67; (2) No signs or symptoms of lung cancer; (3) Current smoker or have quit smoking within the last 15 years; (4) You have a tobacco smoking history of at least 20 pack years (packs per day x number of years you smoked); (5) You get a written order from a healthcare provider. 3. Glaucoma Screening: covered annually if you're considered high risk: (1) You have diabetes OR (2) Family history of glaucoma OR (3)  aged 48 and older OR (3)  American aged 72 and older  3. Osteoporosis Screening: covered every 2 years if you meet one of the following conditions: (1) Have a vertebral abnormality; (2) On glucocorticoid therapy for more than 3 months; (3) Have primary hyperparathyroidism; (4) On osteoporosis medications and need to assess response to drug therapy. 5. HIV Screening: covered annually if you're between the age of 14-79. Also covered annually if you are younger than 13 and older than 72 with risk factors for HIV infection. For pregnant patients, it is covered up to 3 times per pregnancy.     Immunizations:  Immunization Recommendations   Influenza Vaccine Annual influenza vaccination during flu season is recommended for all persons aged >= 6 months who do not have contraindications   Pneumococcal Vaccine   * Pneumococcal conjugate vaccine = PCV13 (Prevnar 13), PCV15 (Vaxneuvance), PCV20 (Prevnar 20)  * Pneumococcal polysaccharide vaccine = PPSV23 (Pneumovax) Adults 20-63 years old: 1-3 doses may be recommended based on certain risk factors  Adults 72 years old: 1-2 doses may be recommended based off what pneumonia vaccine you previously received   Hepatitis B Vaccine 3 dose series if at intermediate or high risk (ex: diabetes, end stage renal disease, liver disease)   Tetanus (Td) Vaccine - COST NOT COVERED BY MEDICARE PART B Following completion of primary series, a booster dose should be given every 10 years to maintain immunity against tetanus. Td may also be given as tetanus wound prophylaxis. Tdap Vaccine - COST NOT COVERED BY MEDICARE PART B Recommended at least once for all adults. For pregnant patients, recommended with each pregnancy. Shingles Vaccine (Shingrix) - COST NOT COVERED BY MEDICARE PART B  2 shot series recommended in those aged 48 and above     Health Maintenance Due:      Topic Date Due   • Hepatitis C Screening  Never done   • Colorectal Cancer Screening  02/25/2027     Immunizations Due:      Topic Date Due   • COVID-19 Vaccine (6 - Moderna series) 05/27/2022   • Influenza Vaccine (1) 09/01/2023     Advance Directives   What are advance directives? Advance directives are legal documents that state your wishes and plans for medical care. These plans are made ahead of time in case you lose your ability to make decisions for yourself. Advance directives can apply to any medical decision, such as the treatments you want, and if you want to donate organs. What are the types of advance directives? There are many types of advance directives, and each state has rules about how to use them. You may choose a combination of any of the following:  · Living will: This is a written record of the treatment you want. You can also choose which treatments you do not want, which to limit, and which to stop at a certain time. This includes surgery, medicine, IV fluid, and tube feedings. · Durable power of  for healthcare [facility]):   This is a written record that states who you want to make healthcare choices for you when you are unable to make them for yourself. This person, called a proxy, is usually a family member or a friend. You may choose more than 1 proxy. · Do not resuscitate (DNR) order:  A DNR order is used in case your heart stops beating or you stop breathing. It is a request not to have certain forms of treatment, such as CPR. A DNR order may be included in other types of advance directives. · Medical directive: This covers the care that you want if you are in a coma, near death, or unable to make decisions for yourself. You can list the treatments you want for each condition. Treatment may include pain medicine, surgery, blood transfusions, dialysis, IV or tube feedings, and a ventilator (breathing machine). · Values history: This document has questions about your views, beliefs, and how you feel and think about life. This information can help others choose the care that you would choose. Why are advance directives important? An advance directive helps you control your care. Although spoken wishes may be used, it is better to have your wishes written down. Spoken wishes can be misunderstood, or not followed. Treatments may be given even if you do not want them. An advance directive may make it easier for your family to make difficult choices about your care. Weight Management   Why it is important to manage your weight:  Being overweight increases your risk of health conditions such as heart disease, high blood pressure, type 2 diabetes, and certain types of cancer. It can also increase your risk for osteoarthritis, sleep apnea, and other respiratory problems. Aim for a slow, steady weight loss. Even a small amount of weight loss can lower your risk of health problems. How to lose weight safely:  A safe and healthy way to lose weight is to eat fewer calories and get regular exercise.  You can lose up about 1 pound a week by decreasing the number of calories you eat by 500 calories each day. Healthy meal plan for weight management:  A healthy meal plan includes a variety of foods, contains fewer calories, and helps you stay healthy. A healthy meal plan includes the following:  · Eat whole-grain foods more often. A healthy meal plan should contain fiber. Fiber is the part of grains, fruits, and vegetables that is not broken down by your body. Whole-grain foods are healthy and provide extra fiber in your diet. Some examples of whole-grain foods are whole-wheat breads and pastas, oatmeal, brown rice, and bulgur. · Eat a variety of vegetables every day. Include dark, leafy greens such as spinach, kale, faheem greens, and mustard greens. Eat yellow and orange vegetables such as carrots, sweet potatoes, and winter squash. · Eat a variety of fruits every day. Choose fresh or canned fruit (canned in its own juice or light syrup) instead of juice. Fruit juice has very little or no fiber. · Eat low-fat dairy foods. Drink fat-free (skim) milk or 1% milk. Eat fat-free yogurt and low-fat cottage cheese. Try low-fat cheeses such as mozzarella and other reduced-fat cheeses. · Choose meat and other protein foods that are low in fat. Choose beans or other legumes such as split peas or lentils. Choose fish, skinless poultry (chicken or turkey), or lean cuts of red meat (beef or pork). Before you cook meat or poultry, cut off any visible fat. · Use less fat and oil. Try baking foods instead of frying them. Add less fat, such as margarine, sour cream, regular salad dressing and mayonnaise to foods. Eat fewer high-fat foods. Some examples of high-fat foods include french fries, doughnuts, ice cream, and cakes. · Eat fewer sweets. Limit foods and drinks that are high in sugar. This includes candy, cookies, regular soda, and sweetened drinks. Exercise:  Exercise at least 30 minutes per day on most days of the week.  Some examples of exercise include walking, biking, dancing, and swimming. You can also fit in more physical activity by taking the stairs instead of the elevator or parking farther away from stores. Ask your healthcare provider about the best exercise plan for you. © Copyright [employer] 2018 Information is for End User's use only and may not be sold, redistributed or otherwise used for commercial purposes.  All illustrations and images included in CareNotes® are the copyrighted property of [employer] Inc[employer] or [address] [place] [address]

## 2023-08-28 NOTE — PROGRESS NOTES
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 Personalized health advice and appropriate referrals for health education or preventive services given if needed, as noted in patient's After Visit Summary.      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)   • Cough   • Radiculopathy, lumbar region   • Spinal stenosis of lumbar region without neurogenic claudication   • Chronic bilateral low back pain without sciatica   • Chronic pain syndrome   • Nonintractable headache   • Chronic pain of right thumb   • Rectal bleeding   • Vasovagal syncope   • Palpitations   • Elevated PSA   • Kidney lesion, native, left   • Pancreatic lesion   • Memory problem   • Dilated aortic root (HCC)   • Eczema   • Gastroesophageal reflux disease without esophagitis   • Hemorrhoids      Past Medical and Surgical History:     Past Medical History:   Diagnosis Date   • Abnormal blood chemistry     last assessed: 5/1/2013   • Abnormal blood sugar     last assessed: 12/18/2014   • Allergic rhinitis     last assessed: 11/8/2013   • Basal cell carcinoma      Face   • Cancer ([address])    • Diabetes mellitus ([address])     last assessed: 4/30/2013   • Low back pain    • Nodule of finger of right hand     last assessed: 10/19/2015   • Obstruction of right eustachian tube     last assessed: 10/11/2016     Past Surgical History:   Procedure Laterality Date   • CATARACT EXTRACTION, BILATERAL     • COLONOSCOPY  1999    1 polyp   • COLONOSCOPY  2007    Hyperplastic polyp   • COLONOSCOPY  2009     And 2013 Normal   • COLONOSCOPY  12/29/2017    15 mm polyp proximal sigmoid colon-tubular adenoma. Diverticulosis by Dr. Nomi Becker   • INGUINAL HERNIA REPAIR        Family History:     Family History   Problem Relation Age of Onset   • No Known Problems Mother    • No Known Problems Father    • Colon cancer Maternal Grandmother         or stomach (unsure)   • Colon cancer Son 43        2x and liver      Social History:     Social History     Socioeconomic History   • Marital status: /Civil Union     Spouse name: None   • Number of children: None   • Years of education: None   • Highest education level: None   Occupational History   • Occupation: RETIRED-   Tobacco Use   • Smoking status: Never   • Smokeless tobacco: Never   Vaping Use   • Vaping Use: Never used   Substance and Sexual Activity   • Alcohol use: Not Currently     Comment: occasionally. • Drug use: No   • Sexual activity: None   Other Topics Concern   • None   Social History Narrative    DOES NOT CONSUME CAFFEINE     Social Determinants of Health     Financial Resource Strain: Low Risk  (8/21/2023)    Overall Financial Resource Strain (CARDIA)    • Difficulty of Paying Living Expenses: Not very hard   Food Insecurity: Not on file   Transportation Needs: No Transportation Needs (8/21/2023)    PRAPARE - Transportation    • Lack of Transportation (Medical): No    • Lack of Transportation (Non-Medical):  No   Physical Activity: Not on file   Stress: Not on file   Social Connections: Not on file   Intimate Partner Violence: Not on file   Housing Stability: Not on file      Medications and Allergies:     Current Outpatient Medications   Medication Sig Dispense Refill   • aspirin 81 mg chewable tablet Chew 81 mg daily     • cholecalciferol (VITAMIN D3) 1,000 units tablet Take 1 tablet by mouth daily     • clobetasol (TEMOVATE) 0.05 % ointment APPLY TWICE A DAY FOR 2 WEEKS, THEN AS NEEDED FLARES  1   • hydrocortisone 2.5 % cream Apply topically 2 (two) times a day prn 30 g 2   • Ivermectin 1 % CREA Apply topically daily as needed     • Multiple Vitamins-Minerals (CENTRUM SILVER 50+MEN PO) Take 1 tablet by mouth daily       • pantoprazole (PROTONIX) 40 mg tablet 1 tab in AM 30 tablet 2   • tamsulosin (FLOMAX) 0.4 mg TAKE 1 CAPSULE BY MOUTH EVERY DAY WITH DINNER 90 capsule 3     No current facility-administered medications for this visit.      Allergies   Allergen Reactions   • Doxycycline    • Amoxicillin Diarrhea   • Prednisone Palpitations     Medrol Dose pack ok    • Sulfamethoxazole-Trimethoprim      Septra      Immunizations:     Immunization History   Administered Date(s) Administered   • COVID-19 MODERNA VACC 0.5 ML IM 01/11/2021, 02/08/2021, 10/22/2021, 04/01/2022   • COVID-19, unspecified 10/22/2021, 04/01/2022   • INFLUENZA 09/22/2015, 09/19/2016, 09/15/2018, 09/24/2021   • Influenza Split High Dose Preservative Free IM 10/10/2014, 09/22/2015, 09/19/2016, 08/31/2017   • Influenza, high dose seasonal 0.7 mL 09/26/2019, 10/14/2020   • Influenza, seasonal, injectable 09/14/2012   • Pneumococcal Conjugate 13-Valent 07/09/2015   • Pneumococcal Polysaccharide PPV23 11/01/2006, 01/29/2020   • Tdap 09/01/2010, 08/12/2022   • Zoster 07/18/2008   • Zoster Vaccine Recombinant 04/15/2019, 06/17/2019      Health Maintenance:         Topic Date Due   • Hepatitis C Screening  Never done   • Colorectal Cancer Screening  02/25/2027         Topic Date Due   • COVID-19 Vaccine (6 - Moderna series) 05/27/2022   • Influenza Vaccine (1) 09/01/2023      Medicare Screening Tests and Risk Assessments:     Lee Winn is here for his Subsequent Wellness visit. Last Medicare Wellness visit information reviewed, patient interviewed and updates made to the record today. Health Risk Assessment:   Patient rates overall health as very good. Patient feels that their physical health rating is slightly worse. Patient is very satisfied with their life. Eyesight was rated as same. Hearing was rated as same. Patient feels that their emotional and mental health rating is same. Patients states they are never, rarely angry. Patient states they are sometimes unusually tired/fatigued. Pain experienced in the last 7 days has been some. Patient's pain rating has been 4/10. Patient states that he has experienced no weight loss or gain in last 6 months. Depression Screening:   PHQ-2 Score: 0      Fall Risk Screening: In the past year, patient has experienced: no history of falling in past year      Home Safety:  Patient does not have trouble with stairs inside or outside of their home. Patient has working smoke alarms and has no working carbon monoxide detector. Home safety hazards include: none. Nutrition:   Current diet is Regular and Limited junk food. Medications:   Patient is currently taking over-the-counter supplements. OTC medications include: Vitamins, aspirin. Patient is able to manage medications. Activities of Daily Living (ADLs)/Instrumental Activities of Daily Living (IADLs):   Walk and transfer into and out of bed and chair?: Yes  Dress and groom yourself?: Yes    Bathe or shower yourself?: Yes    Feed yourself? Yes  Do your laundry/housekeeping?: Yes  Manage your money, pay your bills and track your expenses?: Yes  Make your own meals?: Yes    Do your own shopping?: Yes    Previous Hospitalizations:   Any hospitalizations or ED visits within the last 12 months?: No      Advance Care Planning:   Living will:  Yes Durable POA for healthcare: Yes    Advanced directive: Yes    Advanced directive counseling given: Yes    Five wishes given: Yes    End of Life Decisions reviewed with patient: Yes    Provider agrees with end of life decisions: Yes      Cognitive Screening:   Provider or family/friend/caregiver concerned regarding cognition?: No    PREVENTIVE SCREENINGS      Cardiovascular Screening:    General: Screening Current      Diabetes Screening:     General: Screening Current      Colorectal Cancer Screening:     General: Screening Current      Prostate Cancer Screening:    General: Screening Not Indicated      Osteoporosis Screening:    General: Risks and Benefits Discussed      Abdominal Aortic Aneurysm (AAA) Screening:        General: Risks and Benefits Discussed      Lung Cancer Screening:     General: Screening Not Indicated      Hepatitis C Screening:    General: Risks and Benefits Discussed    Screening, Brief Intervention, and Referral to Treatment (SBIRT)    Screening  Typical number of drinks in a day: 0  Typical number of drinks in a week: 0  Interpretation: Low risk drinking behavior. AUDIT-C Screenin) How often did you have a drink containing alcohol in the past year? monthly or less  2) How many drinks did you have on a typical day when you were drinking in the past year? 1 to 2  3) How often did you have 6 or more drinks on one occasion in the past year? never    AUDIT-C Score: 1  Interpretation: Score 0-3 (male): Negative screen for alcohol misuse    Single Item Drug Screening:  How often have you used an illegal drug (including marijuana) or a prescription medication for non-medical reasons in the past year? never    Single Item Drug Screen Score: 0  Interpretation: Negative screen for possible drug use disorder    No results found.      Physical Exam:     /80 (BP Location: Left arm, Patient Position: Sitting, Cuff Size: Large)   Pulse 78   Temp 97.7 °F (36.5 °C) (Temporal)   Resp 16   Ht 5' 9.69" (1.77 m)   Wt 104 kg (230 lb)   SpO2 95%   BMI 33.30 kg/m²     Physical Exam     Mariusz Vásquez, DO

## 2023-08-28 NOTE — ASSESSMENT & PLAN NOTE
A1c from August 14 was 6.1%, was 5.7%. Again discussed reducing carbohydrates in diet. Continue regular exercise.   We will continue to monitor

## 2023-08-28 NOTE — ASSESSMENT & PLAN NOTE
MRI abdomen January 2023 showed pancreatic cyst suggestive of IPMN.   Being followed by surgical oncology (next appointment March 2024)

## 2023-10-16 ENCOUNTER — OFFICE VISIT (OUTPATIENT)
Dept: URGENT CARE | Facility: CLINIC | Age: 78
End: 2023-10-16
Payer: MEDICARE

## 2023-10-16 VITALS
OXYGEN SATURATION: 98 % | HEART RATE: 80 BPM | BODY MASS INDEX: 32.93 KG/M2 | HEIGHT: 70 IN | WEIGHT: 230 LBS | RESPIRATION RATE: 18 BRPM | TEMPERATURE: 98.1 F

## 2023-10-16 DIAGNOSIS — J01.90 ACUTE NON-RECURRENT SINUSITIS, UNSPECIFIED LOCATION: Primary | ICD-10-CM

## 2023-10-16 PROCEDURE — 99213 OFFICE O/P EST LOW 20 MIN: CPT | Performed by: PHYSICIAN ASSISTANT

## 2023-10-16 PROCEDURE — G0463 HOSPITAL OUTPT CLINIC VISIT: HCPCS | Performed by: PHYSICIAN ASSISTANT

## 2023-10-16 RX ORDER — BENZONATATE 200 MG/1
200 CAPSULE ORAL 3 TIMES DAILY PRN
Qty: 20 CAPSULE | Refills: 0 | Status: SHIPPED | OUTPATIENT
Start: 2023-10-16

## 2023-10-16 RX ORDER — ALBUTEROL SULFATE 90 UG/1
2 AEROSOL, METERED RESPIRATORY (INHALATION) EVERY 6 HOURS PRN
Qty: 8 G | Refills: 0 | Status: SHIPPED | OUTPATIENT
Start: 2023-10-16

## 2023-10-16 RX ORDER — AZELASTINE 1 MG/ML
1 SPRAY, METERED NASAL 2 TIMES DAILY
Qty: 1 ML | Refills: 0 | Status: SHIPPED | OUTPATIENT
Start: 2023-10-16

## 2023-10-16 RX ORDER — AZITHROMYCIN 250 MG/1
TABLET, FILM COATED ORAL
Qty: 6 TABLET | Refills: 0 | Status: SHIPPED | OUTPATIENT
Start: 2023-10-16 | End: 2023-10-20

## 2023-10-16 NOTE — PROGRESS NOTES
[facility]        NAME: Aniya Jara is a 77 y.o. male  : 1945    MRN: [id number]  DATE: 2023  TIME: 8:14 AM    Assessment and Plan   Acute non-recurrent sinusitis, unspecified location [J01.90]  1. Acute non-recurrent sinusitis, unspecified location  azithromycin (ZITHROMAX) 250 mg tablet    azelastine (ASTELIN) 0.1 % nasal spray    albuterol (PROVENTIL HFA,VENTOLIN HFA) 90 mcg/act inhaler    benzonatate (TESSALON) 200 MG capsule            Patient Instructions     Take antibiotic as directed until completed  Use additional medications as directed for symptomatic relief as needed  Motrin and/or Tylenol as needed for any fevers or pain  Continue prescribed home medications as directed  Follow up with PCP in 3-5 days. Proceed to  ER if symptoms worsen. Chief Complaint     Chief Complaint   Patient presents with    Cold Like Symptoms     Patient with cough, chest congestion and head feels stuffy since 1 week ago. COVID negative         History of Present Illness       21-year-old male presents with sinus pain pressure congestion. Symptoms for over a week now. Is been taking over-the-counter remedies without any relief. Denies any sore throats or ear pain. No headaches. Denies any fevers or chills. No chest pain or shortness of breath. No abdominal pain nausea vomiting or diarrhea. Patient reports wheezing sensation at times with productive cough. Sinusitis  This is a new problem. The current episode started 1 to 4 weeks ago. The problem has been gradually worsening since onset. There has been no fever. The pain is moderate. Associated symptoms include congestion, coughing, headaches and sinus pressure. Pertinent negatives include no chills, diaphoresis, ear pain, hoarse voice or sore throat. Past treatments include nothing. The treatment provided no relief. Review of Systems   Review of Systems   Constitutional: Negative. Negative for chills, diaphoresis and fever. HENT:  Positive for congestion and sinus pressure. Negative for ear pain, hoarse voice and sore throat. Eyes: Negative. Respiratory:  Positive for cough. Cardiovascular: Negative. Gastrointestinal: Negative. Musculoskeletal: Negative. Skin: Negative. Neurological:  Positive for headaches.          Current Medications       Current Outpatient Medications:     albuterol (PROVENTIL HFA,VENTOLIN HFA) 90 mcg/act inhaler, Inhale 2 puffs every 6 (six) hours as needed for wheezing or shortness of breath, Disp: 8 g, Rfl: 0    aspirin 81 mg chewable tablet, Chew 81 mg daily, Disp: , Rfl:     azelastine (ASTELIN) 0.1 % nasal spray, 1 spray into each nostril 2 (two) times a day Use in each nostril as directed, Disp: 1 mL, Rfl: 0    azithromycin (ZITHROMAX) 250 mg tablet, Take 2 tablets today then 1 tablet daily x 4 days, Disp: 6 tablet, Rfl: 0    benzonatate (TESSALON) 200 MG capsule, Take 1 capsule (200 mg total) by mouth 3 (three) times a day as needed for cough, Disp: 20 capsule, Rfl: 0    cholecalciferol (VITAMIN D3) 1,000 units tablet, Take 1 tablet by mouth daily, Disp: , Rfl:     clobetasol (TEMOVATE) 0.05 % ointment, APPLY TWICE A DAY FOR 2 WEEKS, THEN AS NEEDED FLARES, Disp: , Rfl: 1    hydrocortisone 2.5 % cream, Apply topically 2 (two) times a day prn, Disp: 30 g, Rfl: 2    Ivermectin 1 % CREA, Apply topically daily as needed, Disp: , Rfl:     Multiple Vitamins-Minerals (CENTRUM SILVER 50+MEN PO), Take 1 tablet by mouth daily  , Disp: , Rfl:     tamsulosin (FLOMAX) 0.4 mg, TAKE 1 CAPSULE BY MOUTH EVERY DAY WITH DINNER, Disp: 90 capsule, Rfl: 3    pantoprazole (PROTONIX) 40 mg tablet, 1 tab in AM (Patient not taking: Reported on 10/16/2023), Disp: 30 tablet, Rfl: 2    Current Allergies     Allergies as of 10/16/2023 - Reviewed 10/16/2023   Allergen Reaction Noted    Doxycycline  08/27/2020    Amoxicillin Diarrhea 10/15/2012    Prednisone Palpitations 11/06/2019    Sulfamethoxazole-trimethoprim 08/27/2020            The following portions of the patient's history were reviewed and updated as appropriate: allergies, current medications, past family history, past medical history, past social history, past surgical history and problem list.     Past Medical History:   Diagnosis Date    Abnormal blood chemistry     last assessed: 5/1/2013    Abnormal blood sugar     last assessed: 12/18/2014    Allergic rhinitis     last assessed: 11/8/2013    Basal cell carcinoma      Face    Cancer ([address])     Diabetes mellitus ([address])     last assessed: 4/30/2013    Low back pain     Nodule of finger of right hand     last assessed: 10/19/2015    Obstruction of right eustachian tube     last assessed: 10/11/2016       Past Surgical History:   Procedure Laterality Date    CATARACT EXTRACTION, BILATERAL      COLONOSCOPY  1999 1 polyp    COLONOSCOPY  2007    Hyperplastic polyp    COLONOSCOPY  2009     And 2013 Normal    COLONOSCOPY  12/29/2017    15 mm polyp proximal sigmoid colon-tubular adenoma. Diverticulosis by Dr. Blayne Orr         Family History   Problem Relation Age of Onset    No Known Problems Mother     No Known Problems Father     Colon cancer Maternal Grandmother         or stomach (unsure)    Colon cancer Son 42        2x and liver         Medications have been verified. Objective   Pulse 80   Temp 98.1 °F (36.7 °C) (Tympanic)   Resp 18   Ht 5' 9.69" (1.77 m)   Wt 104 kg (230 lb)   SpO2 98%   BMI 33.30 kg/m²   No LMP for male patient. Physical Exam     Physical Exam  Vitals and nursing note reviewed. Constitutional:       General: He is not in acute distress. Appearance: Normal appearance. He is well-developed. HENT:      Head: Normocephalic and atraumatic. Right Ear: Hearing, tympanic membrane, ear canal and external ear normal. There is no impacted cerumen.       Left Ear: Hearing, tympanic membrane, ear canal and external ear normal. There is no impacted cerumen. Nose: Congestion and rhinorrhea present. Right Sinus: Frontal sinus tenderness present. No maxillary sinus tenderness. Left Sinus: Frontal sinus tenderness present. No maxillary sinus tenderness. Mouth/Throat:      Pharynx: Uvula midline. No oropharyngeal exudate. Eyes:      General:         Right eye: No discharge. Left eye: No discharge. Conjunctiva/sclera: Conjunctivae normal.   Cardiovascular:      Rate and Rhythm: Normal rate and regular rhythm. Heart sounds: Normal heart sounds. No murmur heard. Pulmonary:      Effort: Pulmonary effort is normal. No respiratory distress. Breath sounds: Wheezing (Scant expiratory wheeze noted on right base) present. No decreased breath sounds, rhonchi or rales. Abdominal:      General: Bowel sounds are normal.      Palpations: Abdomen is soft. Tenderness: There is no abdominal tenderness. Musculoskeletal:         General: Normal range of motion. Cervical back: Normal range of motion and neck supple. Lymphadenopathy:      Cervical: No cervical adenopathy. Skin:     General: Skin is warm and dry. Neurological:      Mental Status: He is alert and oriented to person, place, and time.    Psychiatric:         Mood and Affect: Mood normal.

## 2023-10-16 NOTE — PATIENT INSTRUCTIONS
Take antibiotic as directed until completed  Use additional medications as directed for symptomatic relief as needed  Motrin and/or Tylenol as needed for any fevers or pain  Continue prescribed home medications as directed  Follow up with PCP in 3-5 days. Proceed to  ER if symptoms worsen. Sinusitis   AMBULATORY CARE:   Sinusitis  is inflammation or infection of your sinuses. Sinusitis is most often caused by a virus. Acute sinusitis may last up to 12 weeks. Chronic sinusitis lasts longer than 12 weeks. Recurrent sinusitis means you have 4 or more infections in 1 year. Common signs and symptoms:   Fever    Pain, pressure, redness, or swelling around the forehead, cheeks, or eyes    Thick yellow or green discharge from your nose    Tenderness when you touch your face over your sinuses    Dry cough that happens mostly at night or when you lie down    Headache and face pain that is worse when you lean forward    Tooth pain, or pain when you chew    Seek care immediately if:   You have trouble breathing or wheezing that is getting worse. You have a stiff neck, a fever, or a bad headache. You cannot open your eye. Your eyeball bulges out or you cannot move your eye. You are more sleepy than normal, or you notice changes in your ability to think, move, or talk. You have swelling of your forehead or scalp. Call your doctor if:   You have vision changes, such as double vision. Your eye and eyelid are red, swollen, and painful. Your symptoms do not improve or go away after 10 days. You have nausea and are vomiting. Your nose is bleeding. You have questions or concerns about your condition or care. Medicines: Your symptoms may go away on their own. Your healthcare provider may recommend watchful waiting for up to 10 days before starting antibiotics. You may need any of the following:  Acetaminophen  decreases pain and fever. It is available without a doctor's order.  Ask how much to take and how often to take it. Follow directions. Read the labels of all other medicines you are using to see if they also contain acetaminophen, or ask your doctor or pharmacist. Acetaminophen can cause liver damage if not taken correctly. NSAIDs , such as ibuprofen, help decrease swelling, pain, and fever. This medicine is available with or without a doctor's order. NSAIDs can cause stomach bleeding or kidney problems in certain people. If you take blood thinner medicine, always ask your healthcare provider if NSAIDs are safe for you. Always read the medicine label and follow directions. Nasal steroid sprays  may help decrease inflammation in your nose and sinuses. Decongestants  help reduce swelling and drain mucus in the nose and sinuses. They may help you breathe easier. Antihistamines  help dry mucus in the nose and relieve sneezing. Antibiotics  help treat or prevent a bacterial infection. Self-care:   Rinse your sinuses as directed. Use a sinus rinse device to rinse your nasal passages with a saline (salt water) solution or distilled water. Do not use tap water. This will help thin the mucus in your nose and rinse away pollen and dirt. It will also help reduce swelling so you can breathe normally. Use a humidifier  to increase air moisture in your home. This may make it easier for you to breathe and help decrease your cough. Sleep with your head elevated. Place an extra pillow under your head before you go to sleep to help your sinuses drain. Drink liquids as directed. Ask your healthcare provider how much liquid to drink each day and which liquids are best for you. Liquids will thin the mucus in your nose and help it drain. Avoid drinks that contain alcohol or caffeine. Do not smoke, and avoid secondhand smoke. Nicotine and other chemicals in cigarettes and cigars can make your symptoms worse.  Ask your healthcare provider for information if you currently smoke and need help to quit. E-cigarettes or smokeless tobacco still contain nicotine. Talk to your healthcare provider before you use these products. Prevent the spread of germs:   Wash your hands often with soap and water. Wash your hands after you use the bathroom, change a child's diaper, or sneeze. Wash your hands before you prepare or eat food. Stay away from people who are sick. Some germs spread easily and quickly through contact. Follow up with your doctor as directed: You may be referred to an ear, nose, and throat specialist. Write down your questions so you remember to ask them during your visits. © Copyright [employer] August 2023 Information is for End User's use only and may not be sold, redistributed or otherwise used for commercial purposes. The above information is an  only. It is not intended as medical advice for individual conditions or treatments. Talk to your doctor, nurse or pharmacist before following any medical regimen to see if it is safe and effective for you.

## 2023-10-25 ENCOUNTER — OFFICE VISIT (OUTPATIENT)
Dept: URGENT CARE | Facility: CLINIC | Age: 78
End: 2023-10-25
Payer: MEDICARE

## 2023-10-25 VITALS
TEMPERATURE: 97.6 F | HEART RATE: 85 BPM | RESPIRATION RATE: 18 BRPM | OXYGEN SATURATION: 97 % | SYSTOLIC BLOOD PRESSURE: 153 MMHG | DIASTOLIC BLOOD PRESSURE: 86 MMHG | WEIGHT: 230 LBS | BODY MASS INDEX: 32.93 KG/M2 | HEIGHT: 70 IN

## 2023-10-25 DIAGNOSIS — J40 BRONCHITIS: Primary | ICD-10-CM

## 2023-10-25 PROCEDURE — G0463 HOSPITAL OUTPT CLINIC VISIT: HCPCS | Performed by: PHYSICIAN ASSISTANT

## 2023-10-25 PROCEDURE — 99213 OFFICE O/P EST LOW 20 MIN: CPT | Performed by: PHYSICIAN ASSISTANT

## 2023-10-25 RX ORDER — CEFUROXIME AXETIL 500 MG/1
500 TABLET ORAL EVERY 12 HOURS SCHEDULED
Qty: 14 TABLET | Refills: 0 | Status: SHIPPED | OUTPATIENT
Start: 2023-10-25 | End: 2023-11-01

## 2023-10-25 RX ORDER — METHYLPREDNISOLONE 4 MG/1
TABLET ORAL
Qty: 1 EACH | Refills: 0 | Status: SHIPPED | OUTPATIENT
Start: 2023-10-25 | End: 2023-11-01

## 2023-10-25 RX ORDER — AZELASTINE 1 MG/ML
1 SPRAY, METERED NASAL 2 TIMES DAILY
Qty: 1 ML | Refills: 0 | Status: SHIPPED | OUTPATIENT
Start: 2023-10-25

## 2023-10-25 NOTE — PROGRESS NOTES
[facility]    NAME: Aisha Sanchez is a 77 y.o. male  : 1945    MRN: [id number]  DATE: 2023  TIME: 9:39 AM    Assessment and Plan   Bronchitis [J40]  1. Bronchitis  azelastine (ASTELIN) 0.1 % nasal spray    methylPREDNISolone 4 MG tablet therapy pack    cefuroxime (CEFTIN) 500 mg tablet          Patient Instructions     Patient Instructions   This may be persistent viral symptoms however due to your history we will try different antibiotic. Take as instructed. Take Medrol Dosepak as instructed. This is a lower dose prednisone product. If it feels too strong, you may divide the dose in half and take half strength. Use the Astelin nasal spray to try and decrease nasal congestion and drainage. As long as you do not have any adverse side effects, recommend that you use for a couple weeks to see if this will lessen your chronic nasal congestion and drainage. If this is something helpful, you may want to talk with your primary care provider about refills. Would recommend that you contact your primary care office today or tomorrow to arrange follow-up for approximately 10 to 14 days for reevaluation. Provider that you saw in office today did hear a little bit of jumpiness in mid lower lung fields and would like you to get that rechecked. Certainly if you would develop any profound weakness, significant shortness of breath or chest pain please proceed immediately to emergency room for further evaluation. Chief Complaint     Chief Complaint   Patient presents with    Cough     Patient concerned that he is still coughing. Worse at night. Not taking Tessalon as he is almost at the end of them. History of Present Illness   Aisha Sanchez presents to the clinic c/o  69-year-old male with persisting cough. Seen in this office 2023 for cough, sinus congestion and pressure, postnasal drip. Treated with Zithromax, albuterol inhaler and Tessalon Perles.   Laying down and first thing in the morning seem to be when his cough is at his worst.    Patient says initially he did run fever but that has resolved. He says the cough is persisting and at times he feels wheezy. He does not do any routine allergy medication or nasal spray. Some nasal spray in the past did seem to cause nosebleeds. Denies any history of asthma or pneumonia. Wife was admitted to hospital yesterday with congestive heart failure and respiratory illness. Cough  Associated symptoms include rhinorrhea and wheezing. Pertinent negatives include no chest pain, chills, fever, sore throat or shortness of breath. Review of Systems   Review of Systems   Constitutional:  Positive for fatigue. Negative for activity change, appetite change, chills, diaphoresis and fever. HENT:  Positive for congestion and rhinorrhea. Negative for sinus pressure, sinus pain and sore throat. Chronic nasal congestion and runny nose. Respiratory:  Positive for cough, chest tightness and wheezing. Negative for shortness of breath. Cardiovascular:  Negative for chest pain, palpitations and leg swelling.        Current Medications     Long-Term Medications   Medication Sig Dispense Refill    azelastine (ASTELIN) 0.1 % nasal spray 1 spray into each nostril 2 (two) times a day Use in each nostril as directed 1 mL 0    azelastine (ASTELIN) 0.1 % nasal spray 1 spray into each nostril 2 (two) times a day Use in each nostril as directed 1 mL 0    cholecalciferol (VITAMIN D3) 1,000 units tablet Take 1 tablet by mouth daily      clobetasol (TEMOVATE) 0.05 % ointment APPLY TWICE A DAY FOR 2 WEEKS, THEN AS NEEDED FLARES  1    hydrocortisone 2.5 % cream Apply topically 2 (two) times a day prn 30 g 2    pantoprazole (PROTONIX) 40 mg tablet 1 tab in AM (Patient not taking: Reported on 10/16/2023) 30 tablet 2    tamsulosin (FLOMAX) 0.4 mg TAKE 1 CAPSULE BY MOUTH EVERY DAY WITH DINNER 90 capsule 3       Current Allergies     Allergies as of 10/25/2023 - Reviewed 10/25/2023   Allergen Reaction Noted    Doxycycline  08/27/2020    Amoxicillin Diarrhea 10/15/2012    Prednisone Palpitations 11/06/2019    Sulfamethoxazole-trimethoprim  08/27/2020          The following portions of the patient's history were reviewed and updated as appropriate: allergies, current medications, past family history, past medical history, past social history, past surgical history and problem list.  Past Medical History:   Diagnosis Date    Abnormal blood chemistry     last assessed: 5/1/2013    Abnormal blood sugar     last assessed: 12/18/2014    Allergic rhinitis     last assessed: 11/8/2013    Basal cell carcinoma      Face    Cancer ([address])     Diabetes mellitus ([address])     last assessed: 4/30/2013    Low back pain     Nodule of finger of right hand     last assessed: 10/19/2015    Obstruction of right eustachian tube     last assessed: 10/11/2016     Past Surgical History:   Procedure Laterality Date    CATARACT EXTRACTION, BILATERAL      COLONOSCOPY  1999 1 polyp    COLONOSCOPY  2007    Hyperplastic polyp    COLONOSCOPY  2009     And 2013 Normal    COLONOSCOPY  12/29/2017    15 mm polyp proximal sigmoid colon-tubular adenoma. Diverticulosis by Dr. Celestina Quinn       Family History   Problem Relation Age of Onset    No Known Problems Mother     No Known Problems Father     Colon cancer Maternal Grandmother         or stomach (unsure)    Colon cancer Son 42        2x and liver       Objective   /86   Pulse 85   Temp 97.6 °F (36.4 °C) (Tympanic)   Resp 18   Ht 5' 9.69" (1.77 m)   Wt 104 kg (230 lb)   SpO2 97%   BMI 33.30 kg/m²   No LMP for male patient. Physical Exam     Physical Exam  Vitals and nursing note reviewed. Constitutional:       General: He is not in acute distress. Appearance: He is well-developed. He is ill-appearing. He is not toxic-appearing or diaphoretic. Comments: No trismus or conversational dyspnea. Appears mildly ill but in no acute distress. HENT:      Head: Normocephalic and atraumatic. Right Ear: Tympanic membrane, ear canal and external ear normal.      Left Ear: Tympanic membrane, ear canal and external ear normal.      Nose: Congestion and rhinorrhea present. Mouth/Throat:      Mouth: Mucous membranes are moist.      Pharynx: Posterior oropharyngeal erythema present. No oropharyngeal exudate. Comments: Cobblestoning posterior pharynx with patchy redness. Eyes:      General: No scleral icterus. Right eye: No discharge. Left eye: No discharge. Conjunctiva/sclera: Conjunctivae normal.      Pupils: Pupils are equal, round, and reactive to light. Neck:      Trachea: No tracheal deviation. Cardiovascular:      Rate and Rhythm: Normal rate and regular rhythm. Heart sounds: Normal heart sounds. No murmur heard. No friction rub. No gallop. Pulmonary:      Effort: Pulmonary effort is normal. No respiratory distress. Breath sounds: No stridor. Rhonchi present. No wheezing or rales. Comments: Some coarse rhonchi bilateral bases  Musculoskeletal:      Cervical back: Normal range of motion and neck supple. No rigidity or tenderness. Lymphadenopathy:      Cervical: No cervical adenopathy. Skin:     General: Skin is warm and dry. Findings: No rash. Comments: No acute rashes   Neurological:      Mental Status: He is alert and oriented to person, place, and time.    Psychiatric:         Mood and Affect: Mood normal.         Behavior: Behavior normal.

## 2023-10-25 NOTE — PATIENT INSTRUCTIONS
This may be persistent viral symptoms however due to your history we will try different antibiotic. Take as instructed. Take Medrol Dosepak as instructed. This is a lower dose prednisone product. If it feels too strong, you may divide the dose in half and take half strength. Use the Astelin nasal spray to try and decrease nasal congestion and drainage. As long as you do not have any adverse side effects, recommend that you use for a couple weeks to see if this will lessen your chronic nasal congestion and drainage. If this is something helpful, you may want to talk with your primary care provider about refills. Would recommend that you contact your primary care office today or tomorrow to arrange follow-up for approximately 10 to 14 days for reevaluation. Provider that you saw in office today did hear a little bit of jumpiness in mid lower lung fields and would like you to get that rechecked. Certainly if you would develop any profound weakness, significant shortness of breath or chest pain please proceed immediately to emergency room for further evaluation.

## 2023-11-01 ENCOUNTER — OFFICE VISIT (OUTPATIENT)
Dept: FAMILY MEDICINE CLINIC | Facility: CLINIC | Age: 78
End: 2023-11-01
Payer: MEDICARE

## 2023-11-01 VITALS
HEART RATE: 76 BPM | BODY MASS INDEX: 32.21 KG/M2 | WEIGHT: 225 LBS | OXYGEN SATURATION: 97 % | HEIGHT: 70 IN | TEMPERATURE: 97.7 F | DIASTOLIC BLOOD PRESSURE: 72 MMHG | SYSTOLIC BLOOD PRESSURE: 132 MMHG

## 2023-11-01 DIAGNOSIS — J01.90 ACUTE NON-RECURRENT SINUSITIS, UNSPECIFIED LOCATION: ICD-10-CM

## 2023-11-01 PROCEDURE — 99214 OFFICE O/P EST MOD 30 MIN: CPT | Performed by: FAMILY MEDICINE

## 2023-11-01 RX ORDER — AZELASTINE 1 MG/ML
1 SPRAY, METERED NASAL 2 TIMES DAILY
Qty: 30 ML | Refills: 5 | Status: SHIPPED | OUTPATIENT
Start: 2023-11-01

## 2023-11-01 RX ORDER — METHYLPREDNISOLONE 4 MG/1
TABLET ORAL
Qty: 21 EACH | Refills: 0 | Status: SHIPPED | OUTPATIENT
Start: 2023-11-01

## 2023-11-01 NOTE — PROGRESS NOTES
Assessment/Plan:   1. Acute non-recurrent sinusitis, unspecified location  Visit. Likely secondary to an acute sinusitis. Its time, he was advised to complete his treatment of Ceftin. Continue with his Astelin. We will add methylprednisolone again to further minimize any sinus inflammation. If symptoms are not improving, will consider the addition of a different antibiotic.  - azelastine (ASTELIN) 0.1 % nasal spray; 1 spray into each nostril 2 (two) times a day Use in each nostril as directed  Dispense: 30 mL; Refill: 5  - methylPREDNISolone 4 MG tablet therapy pack; Use as directed on package  Dispense: 21 each; Refill: 0           There are no diagnoses linked to this encounter. Subjective:       Chief Complaint   Patient presents with    Cough     Went to  twice for bronchitis they reccommended to follow up and come get lungs listened to and pt still complains of the cough      Patient ID: Merissa Arita is a 77 y.o. male. Cough  This is a chronic problem. The current episode started 1 to 4 weeks ago. The problem has been unchanged. The cough is Productive of sputum. Associated symptoms include nasal congestion, postnasal drip, rhinorrhea and shortness of breath. Pertinent negatives include no chest pain, chills, ear congestion, ear pain, eye redness, fever, headaches, hemoptysis, myalgias, sore throat or wheezing. Treatments tried: zpack, ceftin, medrol dose pack. The treatment provided no relief. There is no history of environmental allergies. Review of Systems   Constitutional:  Negative for activity change, chills, fatigue and fever. HENT:  Positive for postnasal drip and rhinorrhea. Negative for congestion, ear pain, sinus pressure and sore throat. Eyes:  Negative for redness, itching and visual disturbance. Respiratory:  Positive for cough and shortness of breath. Negative for hemoptysis and wheezing. Cardiovascular:  Negative for chest pain and palpitations.    Gastrointestinal: Negative for abdominal pain, diarrhea and nausea. Endocrine: Negative for cold intolerance and heat intolerance. Genitourinary:  Negative for dysuria, flank pain and frequency. Musculoskeletal:  Negative for arthralgias, back pain, gait problem and myalgias. Skin:  Negative for color change. Allergic/Immunologic: Negative for environmental allergies. Neurological:  Negative for dizziness, numbness and headaches. Psychiatric/Behavioral:  Negative for behavioral problems and sleep disturbance. The following portions of the patient's history were reviewed and updated as appropriate : past family history, past medical history, past social history and past surgical history.     Current Outpatient Medications:     albuterol (PROVENTIL HFA,VENTOLIN HFA) 90 mcg/act inhaler, Inhale 2 puffs every 6 (six) hours as needed for wheezing or shortness of breath, Disp: 8 g, Rfl: 0    aspirin 81 mg chewable tablet, Chew 81 mg daily, Disp: , Rfl:     azelastine (ASTELIN) 0.1 % nasal spray, 1 spray into each nostril 2 (two) times a day Use in each nostril as directed, Disp: 1 mL, Rfl: 0    cefuroxime (CEFTIN) 500 mg tablet, Take 1 tablet (500 mg total) by mouth every 12 (twelve) hours for 7 days, Disp: 14 tablet, Rfl: 0    cholecalciferol (VITAMIN D3) 1,000 units tablet, Take 1 tablet by mouth daily, Disp: , Rfl:     clobetasol (TEMOVATE) 0.05 % ointment, APPLY TWICE A DAY FOR 2 WEEKS, THEN AS NEEDED FLARES, Disp: , Rfl: 1    hydrocortisone 2.5 % cream, Apply topically 2 (two) times a day prn, Disp: 30 g, Rfl: 2    Ivermectin 1 % CREA, Apply topically daily as needed, Disp: , Rfl:     Multiple Vitamins-Minerals (CENTRUM SILVER 50+MEN PO), Take 1 tablet by mouth daily  , Disp: , Rfl:     tamsulosin (FLOMAX) 0.4 mg, TAKE 1 CAPSULE BY MOUTH EVERY DAY WITH DINNER, Disp: 90 capsule, Rfl: 3    azelastine (ASTELIN) 0.1 % nasal spray, 1 spray into each nostril 2 (two) times a day Use in each nostril as directed (Patient not taking: Reported on 11/1/2023), Disp: 1 mL, Rfl: 0    benzonatate (TESSALON) 200 MG capsule, Take 1 capsule (200 mg total) by mouth 3 (three) times a day as needed for cough (Patient not taking: Reported on 11/1/2023), Disp: 20 capsule, Rfl: 0    methylPREDNISolone 4 MG tablet therapy pack, Use as directed on package (Patient not taking: Reported on 11/1/2023), Disp: 1 each, Rfl: 0    pantoprazole (PROTONIX) 40 mg tablet, 1 tab in AM (Patient not taking: Reported on 10/16/2023), Disp: 30 tablet, Rfl: 2         Objective:         Vitals:    11/01/23 1050   BP: 132/72   BP Location: Left arm   Patient Position: Sitting   Cuff Size: Large   Pulse: 76   Temp: 97.7 °F (36.5 °C)   TempSrc: Tympanic   SpO2: 97%   Weight: 102 kg (225 lb)   Height: 5' 9.69" (1.77 m)     Physical Exam  Vitals reviewed. Constitutional:       Appearance: He is well-developed. HENT:      Head: Normocephalic and atraumatic. Nose: Nose normal.      Mouth/Throat:      Pharynx: No oropharyngeal exudate. Eyes:      General: No scleral icterus. Right eye: No discharge. Left eye: No discharge. Pupils: Pupils are equal, round, and reactive to light. Neck:      Trachea: No tracheal deviation. Cardiovascular:      Rate and Rhythm: Normal rate and regular rhythm. Pulses:           Dorsalis pedis pulses are 2+ on the right side and 2+ on the left side. Posterior tibial pulses are 2+ on the right side and 2+ on the left side. Heart sounds: Normal heart sounds. No murmur heard. No friction rub. No gallop. Pulmonary:      Effort: Pulmonary effort is normal. No respiratory distress. Breath sounds: Normal breath sounds. No wheezing or rales. Abdominal:      General: Bowel sounds are normal. There is no distension. Palpations: Abdomen is soft. Tenderness: There is no abdominal tenderness. There is no guarding or rebound. Musculoskeletal:         General: Normal range of motion. Cervical back: Normal range of motion and neck supple. Lymphadenopathy:      Head:      Right side of head: No submental or submandibular adenopathy. Left side of head: No submental or submandibular adenopathy. Cervical: No cervical adenopathy. Right cervical: No superficial, deep or posterior cervical adenopathy. Left cervical: No superficial, deep or posterior cervical adenopathy. Skin:     General: Skin is warm and dry. Findings: No erythema. Neurological:      Mental Status: He is alert and oriented to person, place, and time. Cranial Nerves: No cranial nerve deficit. Sensory: No sensory deficit. Psychiatric:         Mood and Affect: Mood is not anxious or depressed. Speech: Speech normal.         Behavior: Behavior normal.         Thought Content:  Thought content normal.         Judgment: Judgment normal.

## 2023-11-07 DIAGNOSIS — J01.90 ACUTE NON-RECURRENT SINUSITIS, UNSPECIFIED LOCATION: ICD-10-CM

## 2023-11-07 RX ORDER — BENZONATATE 200 MG/1
200 CAPSULE ORAL 3 TIMES DAILY PRN
Qty: 40 CAPSULE | Refills: 0 | Status: SHIPPED | OUTPATIENT
Start: 2023-11-07

## 2023-11-15 ENCOUNTER — HOSPITAL ENCOUNTER (OUTPATIENT)
Dept: NON INVASIVE DIAGNOSTICS | Facility: HOSPITAL | Age: 78
Discharge: HOME | End: 2023-11-15
Payer: MEDICARE

## 2023-11-15 VITALS
HEIGHT: 69 IN | WEIGHT: 225 LBS | DIASTOLIC BLOOD PRESSURE: 72 MMHG | HEART RATE: 76 BPM | BODY MASS INDEX: 33.33 KG/M2 | SYSTOLIC BLOOD PRESSURE: 132 MMHG

## 2023-11-15 DIAGNOSIS — I10 BENIGN ESSENTIAL HYPERTENSION: ICD-10-CM

## 2023-11-15 DIAGNOSIS — R00.2 PALPITATIONS: ICD-10-CM

## 2023-11-15 LAB
AORTIC ROOT: 3.9 CM
APICAL FOUR CHAMBER EJECTION FRACTION: 59 %
ASCENDING AORTA: 3.9 CM
E WAVE DECELERATION TIME: 211 MS
E/A RATIO: 0.75
FRACTIONAL SHORTENING: 29 (ref 28–44)
INTERVENTRICULAR SEPTUM IN DIASTOLE (PARASTERNAL SHORT AXIS VIEW): 1.1 CM
INTERVENTRICULAR SEPTUM: 1.1 CM (ref 0.6–1.1)
LAAS-AP2: 27.1 CM2
LAAS-AP4: 21.6 CM2
LEFT ATRIUM SIZE: 4.2 CM
LEFT ATRIUM VOLUME (MOD BIPLANE): 83 ML
LEFT ATRIUM VOLUME INDEX (MOD BIPLANE): 37.9 ML/M2
LEFT INTERNAL DIMENSION IN SYSTOLE: 3.4 CM (ref 2.1–4)
LEFT VENTRICLE DIASTOLIC VOLUME (MOD BIPLANE): 76 ML
LEFT VENTRICLE SYSTOLIC VOLUME (MOD BIPLANE): 33 ML
LEFT VENTRICULAR INTERNAL DIMENSION IN DIASTOLE: 4.8 CM (ref 3.5–6)
LEFT VENTRICULAR POSTERIOR WALL IN END DIASTOLE: 1.2 CM
LEFT VENTRICULAR STROKE VOLUME: 62 ML
LV EF: 57 %
LVSV (TEICH): 62 ML
MV E'TISSUE VEL-SEP: 7 CM/S
MV PEAK A VEL: 0.81 M/S
MV PEAK E VEL: 61 CM/S
MV STENOSIS PRESSURE HALF TIME: 61 MS
MV VALVE AREA P 1/2 METHOD: 3.61
RA PRESSURE ESTIMATED: 3 MMHG
RIGHT ATRIUM AREA SYSTOLE A4C: 17 CM2
RIGHT VENTRICLE ID DIMENSION: 3.8 CM
SINUS: 4.4 CM
SL CV LEFT ATRIUM LENGTH A2C: 6.2 CM
SL CV LV EF: 55
SL CV PED ECHO LEFT VENTRICLE DIASTOLIC VOLUME (MOD BIPLANE) 2D: 109 ML
SL CV PED ECHO LEFT VENTRICLE SYSTOLIC VOLUME (MOD BIPLANE) 2D: 47 ML
SL CV SINUS OF VALSALVA 2D: 4.4 CM
TRICUSPID ANNULAR PLANE SYSTOLIC EXCURSION: 2.1 CM

## 2023-11-15 PROCEDURE — 93306 TTE W/DOPPLER COMPLETE: CPT

## 2023-11-27 ENCOUNTER — OFFICE VISIT (OUTPATIENT)
Dept: FAMILY MEDICINE CLINIC | Facility: CLINIC | Age: 78
End: 2023-11-27
Payer: MEDICARE

## 2023-11-27 ENCOUNTER — APPOINTMENT (OUTPATIENT)
Dept: RADIOLOGY | Facility: CLINIC | Age: 78
End: 2023-11-27
Payer: MEDICARE

## 2023-11-27 VITALS
SYSTOLIC BLOOD PRESSURE: 142 MMHG | WEIGHT: 226.4 LBS | HEIGHT: 72 IN | TEMPERATURE: 97.5 F | HEART RATE: 82 BPM | OXYGEN SATURATION: 96 % | BODY MASS INDEX: 30.66 KG/M2 | DIASTOLIC BLOOD PRESSURE: 72 MMHG

## 2023-11-27 DIAGNOSIS — R05.8 RECURRENT PRODUCTIVE COUGH: ICD-10-CM

## 2023-11-27 DIAGNOSIS — R05.8 RECURRENT PRODUCTIVE COUGH: Primary | ICD-10-CM

## 2023-11-27 PROCEDURE — 71046 X-RAY EXAM CHEST 2 VIEWS: CPT

## 2023-11-27 PROCEDURE — 99214 OFFICE O/P EST MOD 30 MIN: CPT

## 2023-11-27 RX ORDER — BENZONATATE 200 MG/1
200 CAPSULE ORAL 3 TIMES DAILY PRN
Qty: 40 CAPSULE | Refills: 1 | Status: SHIPPED | OUTPATIENT
Start: 2023-11-27

## 2023-11-27 RX ORDER — FLUTICASONE PROPIONATE 50 MCG
1 SPRAY, SUSPENSION (ML) NASAL DAILY
Qty: 9.9 ML | Refills: 2 | Status: SHIPPED | OUTPATIENT
Start: 2023-11-27

## 2023-11-27 RX ORDER — ALBUTEROL SULFATE 90 UG/1
2 AEROSOL, METERED RESPIRATORY (INHALATION) EVERY 6 HOURS PRN
Qty: 8 G | Refills: 0 | Status: SHIPPED | OUTPATIENT
Start: 2023-11-27

## 2023-11-27 NOTE — PROGRESS NOTES
Name: Chrissy Campos      :       MRN: [id number]  Encounter Provider: Sandra Brumfield PA-C  Encounter Date: 2023   Encounter department: [address] [place] [address]. Recurrent productive cough  Assessment & Plan:  Patient was seen today for a recurrent cough. Patient has been seen multiple times in the past 2 months for this cough. Patient has been given a Z-Andrey and Ceftin with no improvement. Patient states antibiotics did not help at all. Patient has also been trialed on Tessalon, Astelin, and 2 rounds of steroids. Patient states Tessalon and Astelin helped but he needs some refills. Patient had mild wheezing in upper and middle lobes on exam.  At this point, chest x-ray is warranted for continued symptoms despite multiple avenues of treatment. Will check for any signs of pneumonia or other lung pathology. Patient is agreeable to restart albuterol inhaler that was given 2 months ago. Patient states that inhaler did help but he does not have any more. Patient given prescription for Flonase to see if that will help with his sinus congestion. Patient states Astelin worked at first but not so much anymore. Patient instructed to try Flonase first and if he encounters any problems or no improvement he can go back to the Astelin spray. Patient's Tessalon Perles were also refilled to help with cough. At this time checking a chest x-ray to look for pneumonia. I am hesitant to start another antibiotic today due to him being on 2 different antibiotics in the past with no improvement. Patient has been taking a probiotic in the meantime. Patient instructed to get chest x-ray today and we will send him a message or call him with the results. We discussed that depending on the results of the chest x-ray we may start antibiotics.     If chest x-ray is clear and shows no pathology, patient instructed to try the Flonase, albuterol, and Tessalon Perles for 2 weeks.  If he still has symptoms after 2 weeks we will recheck him to assess improvement in wheezing. We may consider using a different inhaler or consider other pathologies such as reflux. Patient has been treated for reflux in the past but states he has not taken  his Protonix in a while. Orders:  -     XR chest pa & lateral; Future; Expected date: 11/27/2023  -     albuterol (PROVENTIL HFA,VENTOLIN HFA) 90 mcg/act inhaler; Inhale 2 puffs every 6 (six) hours as needed for wheezing or shortness of breath  -     benzonatate (TESSALON) 200 MG capsule; Take 1 capsule (200 mg total) by mouth 3 (three) times a day as needed for cough  -     fluticasone (FLONASE) 50 mcg/act nasal spray; 1 spray into each nostril daily      BMI Counseling: Body mass index is 30.71 kg/m². The BMI is above normal. Nutrition recommendations include decreasing fast food intake, consuming healthier snacks and moderation in carbohydrate intake. Exercise recommendations include exercising 3-5 times per week. Rationale for BMI follow-up plan is due to patient being overweight or obese. Subjective      Patient presents to the clinic with a cough for a couple of months that has been treated with Ceftin and Z pack. Patient states he did not have much improvement with antibiotics. He has also been treated with albuterol, Astelin, and 2 courses of steroids. Patient states he has not had any fever and chills. Patient does endorse some chest pain associated with coughing. He has been coughing very frequently and has been waking himself up at night with a cough. He did say that he checked his pulse oximetry yesterday and his home reader said 92-93% O2. Patient states he does occasionally cough up phlegm but it has never been bloody before. Chest Pain   Associated symptoms include a cough and shortness of breath. Pertinent negatives include no abdominal pain, fever, headaches or palpitations.      Review of Systems   Constitutional: Positive for fatigue. Negative for chills, fever and unexpected weight change. HENT:  Negative for congestion, ear pain, sinus pressure, sinus pain and sore throat. Eyes:  Negative for itching. Respiratory:  Positive for cough and shortness of breath. Cardiovascular:  Positive for chest pain. Negative for palpitations and leg swelling. Gastrointestinal:  Negative for abdominal pain. Genitourinary:  Negative for dysuria. Musculoskeletal:  Negative for arthralgias and myalgias. Skin:  Negative for color change. Neurological:  Negative for syncope and headaches.        Current Outpatient Medications on File Prior to Visit   Medication Sig    aspirin 81 mg chewable tablet Chew 81 mg daily    cholecalciferol (VITAMIN D3) 1,000 units tablet Take 1 tablet by mouth daily    clobetasol (TEMOVATE) 0.05 % ointment APPLY TWICE A DAY FOR 2 WEEKS, THEN AS NEEDED FLARES    hydrocortisone 2.5 % cream Apply topically 2 (two) times a day prn    Multiple Vitamins-Minerals (CENTRUM SILVER 50+MEN PO) Take 1 tablet by mouth daily      tamsulosin (FLOMAX) 0.4 mg TAKE 1 CAPSULE BY MOUTH EVERY DAY WITH DINNER    azelastine (ASTELIN) 0.1 % nasal spray 1 spray into each nostril 2 (two) times a day Use in each nostril as directed (Patient not taking: Reported on 11/1/2023)    azelastine (ASTELIN) 0.1 % nasal spray 1 spray into each nostril 2 (two) times a day Use in each nostril as directed (Patient not taking: Reported on 11/27/2023)    Ivermectin 1 % CREA Apply topically daily as needed (Patient not taking: Reported on 11/27/2023)    methylPREDNISolone 4 MG tablet therapy pack Use as directed on package (Patient not taking: Reported on 11/27/2023)    pantoprazole (PROTONIX) 40 mg tablet 1 tab in AM (Patient not taking: Reported on 10/16/2023)    [DISCONTINUED] albuterol (PROVENTIL HFA,VENTOLIN HFA) 90 mcg/act inhaler Inhale 2 puffs every 6 (six) hours as needed for wheezing or shortness of breath (Patient not taking: Reported on 11/27/2023)    [DISCONTINUED] benzonatate (TESSALON) 200 MG capsule Take 1 capsule (200 mg total) by mouth 3 (three) times a day as needed for cough (Patient not taking: Reported on 11/27/2023)       Objective     /72 (BP Location: Left arm, Patient Position: Sitting, Cuff Size: Adult)   Pulse 82   Temp 97.5 °F (36.4 °C)   Ht 6' (1.829 m)   Wt 103 kg (226 lb 6.4 oz)   SpO2 96%   BMI 30.71 kg/m²     Physical Exam  Constitutional:       Appearance: Normal appearance. HENT:      Head: Normocephalic. Mouth/Throat:      Mouth: Mucous membranes are moist.      Pharynx: Oropharynx is clear. Eyes:      Pupils: Pupils are equal, round, and reactive to light. Cardiovascular:      Rate and Rhythm: Normal rate and regular rhythm. Heart sounds: No murmur heard. No friction rub. Pulmonary:      Effort: Pulmonary effort is normal. No tachypnea or bradypnea. Breath sounds: No decreased air movement. Examination of the right-upper field reveals wheezing. Examination of the left-upper field reveals wheezing. Examination of the right-middle field reveals wheezing. Examination of the left-middle field reveals wheezing. Wheezing present. No rhonchi or rales. Comments: Mild wheezing in upper and mid back  Abdominal:      General: Bowel sounds are normal.   Skin:     General: Skin is warm and dry. Capillary Refill: Capillary refill takes less than 2 seconds. Neurological:      General: No focal deficit present. Mental Status: He is alert.        Sydney Yang PA-C

## 2023-11-27 NOTE — ASSESSMENT & PLAN NOTE
Patient was seen today for a recurrent cough. Patient has been seen multiple times in the past 2 months for this cough. Patient has been given a Z-Andrey and Ceftin with no improvement. Patient states antibiotics did not help at all. Patient has also been trialed on Tessalon, Astelin, and 2 rounds of steroids. Patient states Tessalon and Astelin helped but he needs some refills. Patient had mild wheezing in upper and middle lobes on exam.  Patient had normal respiratory effort. At this point, a chest x-ray is warranted for continued symptoms despite multiple avenues of treatment. Will check for any signs of pneumonia or other lung pathology. Patient is agreeable to restart albuterol inhaler that was given 2 months ago. Patient states that inhaler did help but he does not have any more. Patient given prescription for Flonase to see if that will help with his sinus congestion. Patient states Astelin worked at first but not so much anymore. Patient instructed to try Flonase first and if he encounters any problems or no improvement he can go back to the Astelin spray. Patient's Tessalon Perles were also refilled to help with cough. At this time checking a chest x-ray to look for pneumonia. I am hesitant to start another antibiotic today due to him being on 2 different antibiotics in the past with no improvement. Patient has been taking a probiotic in the meantime. Patient instructed to get chest x-ray today and we will send him a message or call him with the results. We discussed that depending on the results of the chest x-ray we may start antibiotics. If chest x-ray is clear and shows no pathology, patient instructed to try the Flonase, albuterol, and Tessalon Perles for 2 weeks. If he still has symptoms after 2 weeks we will recheck him to assess improvement in wheezing. We may consider using a different inhaler or consider other pathologies such as reflux.   Patient has been treated for reflux in the past but states he has not taken  his Protonix in a while.

## 2023-12-07 ENCOUNTER — OFFICE VISIT (OUTPATIENT)
Dept: CARDIOLOGY CLINIC | Facility: CLINIC | Age: 78
End: 2023-12-07
Payer: MEDICARE

## 2023-12-07 VITALS
DIASTOLIC BLOOD PRESSURE: 80 MMHG | WEIGHT: 226.8 LBS | SYSTOLIC BLOOD PRESSURE: 140 MMHG | BODY MASS INDEX: 30.76 KG/M2 | HEART RATE: 79 BPM

## 2023-12-07 DIAGNOSIS — I10 BENIGN ESSENTIAL HYPERTENSION: ICD-10-CM

## 2023-12-07 DIAGNOSIS — M48.061 SPINAL STENOSIS OF LUMBAR REGION WITHOUT NEUROGENIC CLAUDICATION: ICD-10-CM

## 2023-12-07 DIAGNOSIS — I77.810 DILATED AORTIC ROOT (HCC): Primary | ICD-10-CM

## 2023-12-07 DIAGNOSIS — C85.80: ICD-10-CM

## 2023-12-07 PROCEDURE — 99214 OFFICE O/P EST MOD 30 MIN: CPT

## 2023-12-07 PROCEDURE — 93000 ELECTROCARDIOGRAM COMPLETE: CPT

## 2023-12-07 RX ORDER — AMLODIPINE BESYLATE 5 MG/1
5 TABLET ORAL DAILY
Qty: 90 TABLET | Refills: 3 | Status: SHIPPED | OUTPATIENT
Start: 2023-12-07

## 2023-12-07 NOTE — PROGRESS NOTES
Cardiology   MD Ezekiel Padilla MD, Agustín Anthony DO, [facility] FACP Ather Mansoor, MD Edyth Pickup, DO, Erenest Current, DO, [facility]  -------------------------------------------------------------------  [facility]  [address] [place] [id number]  Phone: [phone]  Fax: [phone]  12/07/23  Xiang Merida  YOB: 1945   MRN: [id number]      Referring Physician: Benjamin Tomlinson DO  [address]  [address] [facility],  [address] [place] [address]     HPI: Xiang Merida is a 77 y.o. 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 He states he is doing well, notes no acute issues at this time, feeling well. No chest pain or shortness of breath. Review of Systems   Constitutional:  Negative for chills and fever. HENT:  Negative for facial swelling and sore throat. Eyes:  Negative for visual disturbance. Respiratory:  Positive for cough. Negative for chest tightness, shortness of breath and wheezing. Cardiovascular:  Negative for chest pain, palpitations and leg swelling. Gastrointestinal:  Negative for abdominal pain, blood in stool, constipation, diarrhea, nausea and vomiting. Endocrine: Negative for cold intolerance and heat intolerance. Genitourinary:  Negative for decreased urine volume, difficulty urinating, dysuria and hematuria. Musculoskeletal:  Negative for arthralgias, back pain and myalgias. Skin:  Negative for rash. Neurological:  Negative for dizziness, syncope, weakness and numbness. Psychiatric/Behavioral:  Negative for agitation, behavioral problems and confusion. The patient is not nervous/anxious.          OBJECTIVE  Vitals:    12/07/23 1431   BP: 140/80   Pulse: 79       Physical Exam  Constitutional: awake, alert and oriented, in no acute distress, no obvious deformities  Head: Normocephalic, without obvious abnormality, atraumatic  Eyes: conjunctivae clear and moist. Sclera anicteric. No xanthelasmas. Pupils equal bilaterally. Extraocular motions are full. Ear nose mouth and throat: ears are symmetrical bilaterally, hearing appears to be equal bilaterally, no nasal discharge or epistaxis, oropharynx is clear with moist mucous membranes  Neck:  Trachea is midline, neck is supple, no thyromegaly or significant lymphadenopathy, there is full range of motion. Lungs: clear to auscultation bilaterally, no wheezes, no rales, no rhonchi, no accessory muscle use, breathing is nonlabored  Heart: regular rate and rhythm, S1, S2 normal, no murmur, no click, rub or gallop, no lower extremity edema  Abdomen: soft, non-tender; bowel sounds normal; no masses,  no organomegaly  Psychiatric:  Patient is oriented to time, place, person, mood/affect is negative for depression, anxiety, agitation, appears to have appropriate insight  Skin: Skin is warm, dry, intact. No obvious rashes or lesions on exposed extremities. Nail beds are pink with no cyanosis or clubbing. 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 States he is doing well, denies any symptoms at this time. His aorta is mildly dilated on echo but stable. Blood pressure is elevated however.   Currently on no antihypertensive medication  Will start amlodipine 5 mg today  Plan for follow-up to recheck blood pressure  Asymptomatic at this time with respect to palpitations  Will plan for follow-up in the spring to recheck blood pressure    Rickey Luna DO, [facility], [address]  --------------------------------------------------------------------------------  TREADMILL STRESS  No results found for this or any previous visit.     ----------------------------------------------------------------------------------------------  NUCLEAR STRESS TEST: No results found for this or any previous visit. No results found for this or any previous visit.      --------------------------------------------------------------------------------  CATH:  No results found for this or any previous visit.    --------------------------------------------------------------------------------  ECHO:   No results found for this or any previous visit. No results found for this or any previous visit.    --------------------------------------------------------------------------------  HOLTER  No results found for this or any previous visit. No results found for this or any previous visit.    --------------------------------------------------------------------------------  CAROTIDS  Results for orders placed during the hospital encounter of 05/19/21    VAS carotid complete study    Narrative  [facility] REPORT  CLINICAL:  Indications:  Patient presents with to evaluate for carotid artery stenosis s/p recent  possible TIA. The patient admits to hearing a thump in the back of his head  with feelings of dizziness afterwards. Patient is currently asymptomatic. Operative History:  Patient denies any cardiovascular procedures  Risk Factors  The patient has history of HTN. Clinical  Right Pressure:  124/79 mm Hg, Left Pressure:  121/74 mm Hg. FINDINGS:    Right        Impression  PSV  EDV (cm/s)  Direction of Flow  Dist. ICA                 74          18  Mid. ICA                  68          21  Prox.  ICA    1 - 49%      67          25  Dist CCA                  98          27  Mid CCA                  107          25  Prox CCA                 120 25  Ext Carotid               94          12  Prox Vert                 43          11  Antegrade  Subclavian               111           0    Left         Impression  PSV  EDV (cm/s)  Direction of Flow  Dist. ICA                 80          22  Mid. ICA                  64          22  Prox. ICA    1 - 49%      85          31  Dist CCA                 106          28  Mid CCA                  109          21  Prox CCA                 145          36  Ext Carotid               95          14  Prox Vert                 85          23  Antegrade  Subclavian               106           0        CONCLUSION:  Impression  RIGHT:  There is <50% stenosis noted in the internal carotid artery. Plaque is  homogenous and smooth. Vertebral artery flow is antegrade and at its greatest diameter measures 0.55  cm. There is no significant subclavian artery disease. LEFT:  There is <50% stenosis noted in the internal carotid artery. Plaque is  homogenous and smooth. Vertebral artery flow is antegrade and at its greatest diameter measuring 0.60  cm. There is no significant subclavian artery disease. Internal carotid artery stenosis determination by consensus criteria from:  Duong Red., et.al. Carotid Artery Stenosis: Gray-Scale and Doppler US Diagnosis  - Society of Radiologists in [facility] [address], Radiology 2003;  [phone]. 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[address])    Spinal stenosis of lumbar region without neurogenic claudication       ======================================================    Past Medical History:   Diagnosis Date   • Abnormal blood chemistry     last assessed: 5/1/2013   • Abnormal blood sugar     last assessed: 12/18/2014   • Allergic rhinitis     last assessed: 11/8/2013   • Basal cell carcinoma      Face   • Cancer ([address])    • Diabetes mellitus ([address])     last assessed: 4/30/2013   • Low back pain    • Nodule of finger of right hand     last assessed: 10/19/2015   • Obstruction of right eustachian tube     last assessed: 10/11/2016     Past Surgical History:   Procedure Laterality Date   • CATARACT EXTRACTION, BILATERAL     • COLONOSCOPY  1999 1 polyp   • COLONOSCOPY  2007    Hyperplastic polyp   • COLONOSCOPY  2009     And 2013 Normal   • COLONOSCOPY  12/29/2017    15 mm polyp proximal sigmoid colon-tubular adenoma.   Diverticulosis by Dr. Gui Frey   • INGUINAL HERNIA REPAIR           Medications  Current Outpatient Medications   Medication Sig Dispense Refill   • albuterol (PROVENTIL HFA,VENTOLIN HFA) 90 mcg/act inhaler Inhale 2 puffs every 6 (six) hours as needed for wheezing or shortness of breath 8 g 0   • amLODIPine (NORVASC) 5 mg tablet Take 1 tablet (5 mg total) by mouth daily 90 tablet 3   • aspirin 81 mg chewable tablet Chew 81 mg daily     • benzonatate (TESSALON) 200 MG capsule Take 1 capsule (200 mg total) by mouth 3 (three) times a day as needed for cough 40 capsule 1   • cholecalciferol (VITAMIN D3) 1,000 units tablet Take 1 tablet by mouth daily     • clobetasol (TEMOVATE) 0.05 % ointment APPLY TWICE A DAY FOR 2 WEEKS, THEN AS NEEDED FLARES  1   • fluticasone (FLONASE) 50 mcg/act nasal spray 1 spray into each nostril daily 9.9 mL 2   • hydrocortisone 2.5 % cream Apply topically 2 (two) times a day prn 30 g 2   • Multiple Vitamins-Minerals (CENTRUM SILVER 50+MEN PO) Take 1 tablet by mouth daily       • tamsulosin (FLOMAX) 0.4 mg TAKE 1 CAPSULE BY MOUTH EVERY DAY WITH DINNER 90 capsule 3   • azelastine (ASTELIN) 0.1 % nasal spray 1 spray into each nostril 2 (two) times a day Use in each nostril as directed (Patient not taking: Reported on 11/1/2023) 1 mL 0   • azelastine (ASTELIN) 0.1 % nasal spray 1 spray into each nostril 2 (two) times a day Use in each nostril as directed (Patient not taking: Reported on 11/27/2023) 30 mL 5   • Ivermectin 1 % CREA Apply topically daily as needed (Patient not taking: Reported on 11/27/2023)     • methylPREDNISolone 4 MG tablet therapy pack Use as directed on package (Patient not taking: Reported on 11/27/2023) 21 each 0   • pantoprazole (PROTONIX) 40 mg tablet 1 tab in AM (Patient not taking: Reported on 10/16/2023) 30 tablet 2     No current facility-administered medications for this visit. Allergies   Allergen Reactions   • Doxycycline    • Amoxicillin Diarrhea   • Prednisone Palpitations     Medrol Dose pack ok    • Sulfamethoxazole-Trimethoprim      Septra       Social History     Socioeconomic History   • Marital status: /Civil Union     Spouse name: Not on file   • Number of children: Not on file   • Years of education: Not on file   • Highest education level: Not on file   Occupational History   • Occupation: RETIRED-   Tobacco Use   • Smoking status: Never   • Smokeless tobacco: Never   Vaping Use   • Vaping Use: Never used   Substance and Sexual Activity   • Alcohol use: Not Currently     Comment: occasionally.    • Drug use: No   • Sexual activity: Not on file   Other Topics Concern   • Not on file   Social History Narrative    DOES NOT CONSUME CAFFEINE     Social Determinants of Health     Financial Resource Strain: Low Risk  (8/21/2023)    Overall Financial Resource Strain (CARDIA)    • Difficulty of Paying Living Expenses: Not very hard   Food Insecurity: Not on file   Transportation Needs: No Transportation Needs (8/21/2023)    PRAPARE - Transportation • Lack of Transportation (Medical): No    • Lack of Transportation (Non-Medical):  No   Physical Activity: Not on file   Stress: Not on file   Social Connections: Not on file   Intimate Partner Violence: Not on file   Housing Stability: Not on file        Family History   Problem Relation Age of Onset   • No Known Problems Mother    • No Known Problems Father    • Colon cancer Maternal Grandmother         or stomach (unsure)   • Colon cancer Son 42        2x and liver       Lab Results   Component Value Date    WBC 5.71 08/14/2023    HGB 15.7 08/14/2023    HCT 46.4 08/14/2023    MCV 93 08/14/2023     08/14/2023      Lab Results   Component Value Date    SODIUM 142 08/14/2023    K 4.1 08/14/2023     (H) 08/14/2023    CO2 31 08/14/2023    BUN 12 08/14/2023    CREATININE 1.01 08/14/2023    GLUC 94 03/06/2019    CALCIUM 8.9 08/14/2023      Lab Results   Component Value Date    HGBA1C 6.1 (H) 08/14/2023      Lab Results   Component Value Date    CHOL 157 07/06/2015    CHOL 172 07/02/2014     Lab Results   Component Value Date    HDL 46 08/14/2023    HDL 46 08/03/2022    HDL 56 08/02/2021     Lab Results   Component Value Date    LDLCALC 81 08/14/2023    LDLCALC 77 08/03/2022    LDLCALC 77 08/02/2021     Lab Results   Component Value Date    TRIG 104 08/14/2023    TRIG 79 08/03/2022    TRIG 89 08/02/2021     No results found for: "CHOLHDL"   No results found for: "INR", "PROTIME"       Patient Active Problem List    Diagnosis Date Noted   • Right wrist pain 08/28/2023   • Gastroesophageal reflux disease without esophagitis 05/10/2023   • Hemorrhoids 05/10/2023   • Memory problem 02/20/2023   • Dilated aortic root ([address]) 02/20/2023   • Eczema 02/20/2023   • Kidney lesion, native, left 11/22/2022   • Pancreatic lesion 11/22/2022   • Elevated PSA 08/09/2022   • Palpitations 02/09/2022   • Vasovagal syncope 10/08/2021   • Chronic pain of right thumb 08/09/2021   • Nonintractable headache 05/12/2021   • Chronic bilateral low back pain without sciatica 07/31/2020   • Chronic pain syndrome 07/31/2020   • Spinal stenosis of lumbar region without neurogenic claudication    • Radiculopathy, lumbar region    • Recurrent productive cough 03/12/2020   • 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 Occasional wrong word or "sound a like" substitutions may have occurred due to the inherent limitations of voice recognition software. Read the chart carefully and recognize, using context, where substitutions have occurred.     Jose Askew DO, [facility]  12/7/2023 3:26 PM

## 2023-12-14 ENCOUNTER — OFFICE VISIT (OUTPATIENT)
Dept: FAMILY MEDICINE CLINIC | Facility: CLINIC | Age: 78
End: 2023-12-14
Payer: MEDICARE

## 2023-12-14 VITALS
BODY MASS INDEX: 30.72 KG/M2 | SYSTOLIC BLOOD PRESSURE: 130 MMHG | TEMPERATURE: 97.7 F | OXYGEN SATURATION: 95 % | WEIGHT: 226.8 LBS | HEART RATE: 85 BPM | DIASTOLIC BLOOD PRESSURE: 68 MMHG | HEIGHT: 72 IN

## 2023-12-14 DIAGNOSIS — R05.3 CHRONIC COUGH: Primary | ICD-10-CM

## 2023-12-14 PROCEDURE — 99213 OFFICE O/P EST LOW 20 MIN: CPT

## 2023-12-14 NOTE — ASSESSMENT & PLAN NOTE
Patient presents today with a chronic cough that has been going on for about 2 months. Patient was treated with multiple rounds of antibiotics and oral steroids with no improvement. Patient was given albuterol, Flonase, and Tessalon Perles couple weeks ago and states they helped with some symptoms however the cough lingers. He states he has been having a harder time breathing lately. He did have a chest x-ray done a couple weeks ago with no significant cardiac or pulmonary findings. He also had an EKG done last week at cardiology, which was nonsignificant for any cardiac disease. He states he did mention the cough to cardiology and they did not think there was a cardiac cause to it. Due to the length of his cough, and his worsening shortness of breath, I did send for a chest CT to evaluate for any pulmonary disease. He will obtain this as soon as he can schedule for it. Instructed the patient he could continue over-the-counter medications if they helped his symptoms as well as start a Zyrtec or Claritin to help with postnasal drip. If there are any significant findings on the CT scan, we may consider referral to pulmonology. I also discussed with him I could send in a steroid inhaler after we obtain the results of the chest CT if there are no significant findings. He is very agreeable with the plan today and will let us know if he has any problems. ER precautions if he develops worsening symptoms.

## 2023-12-14 NOTE — PROGRESS NOTES
Name: Bryan Veras      :       MRN: [id number]  Encounter Provider: Lizy Roca PA-C  Encounter Date: 2023   Encounter department: [address] [place] [address]. Chronic cough  Assessment & Plan:  Patient presents today with a chronic cough that has been going on for about 2 months. Patient was treated with multiple rounds of antibiotics and oral steroids with no improvement. Patient was given albuterol, Flonase, and Tessalon Perles couple weeks ago and states they helped with some symptoms however the cough lingers. He states he has been having a harder time breathing lately. He did have a chest x-ray done a couple weeks ago with no significant cardiac or pulmonary findings. He also had an EKG done last week at cardiology, which was nonsignificant for any cardiac disease. He states he did mention the cough to cardiology and they did not think there was a cardiac cause to it. Due to the length of his cough, and his worsening shortness of breath, I did send for a chest CT to evaluate for any pulmonary disease. He will obtain this as soon as he can schedule for it. Instructed the patient he could continue over-the-counter medications if they helped his symptoms as well as start a Zyrtec or Claritin to help with postnasal drip. If there are any significant findings on the CT scan, we may consider referral to pulmonology. I also discussed with him I could send in a steroid inhaler after we obtain the results of the chest CT if there are no significant findings. He is very agreeable with the plan today and will let us know if he has any problems. ER precautions if he develops worsening symptoms. Orders:  -     CT chest wo contrast; Future; Expected date: 2023           Subjective      Patient presents today with a chronic cough that has been going on since October.  Patient was given a Z pack, Ceftin, and multiple steroids with no improvement of symptoms. Patient states he is having to breathe deeper and has been becoming more easily winded lately. He endorses chest tightness from coughing but denies any chest pain. Patient was just at cardiology and received an EKG. Patient reports compliance with the albuterol and flonase, but the tessalon perles did nto work well for him. Cough  The current episode started more than 1 month ago. The problem has been unchanged. The problem occurs every few minutes. The cough is Non-productive. Associated symptoms include postnasal drip and shortness of breath. Pertinent negatives include no chest pain, chills, fever, headaches or sore throat. He has tried a beta-agonist inhaler, OTC cough suppressant and oral steroids for the symptoms. The treatment provided mild relief. Review of Systems   Constitutional:  Negative for chills, fatigue and fever. HENT:  Positive for postnasal drip. Negative for congestion, sinus pressure, sinus pain and sore throat. Respiratory:  Positive for cough, chest tightness and shortness of breath. Cardiovascular:  Negative for chest pain, palpitations and leg swelling. Gastrointestinal:  Negative for abdominal pain. Genitourinary:  Negative for difficulty urinating and dysuria. Musculoskeletal:  Negative for arthralgias and joint swelling. Neurological:  Negative for dizziness, light-headedness and headaches. Psychiatric/Behavioral:  Negative for behavioral problems. The patient is not nervous/anxious.         Current Outpatient Medications on File Prior to Visit   Medication Sig    albuterol (PROVENTIL HFA,VENTOLIN HFA) 90 mcg/act inhaler Inhale 2 puffs every 6 (six) hours as needed for wheezing or shortness of breath    amLODIPine (NORVASC) 5 mg tablet Take 1 tablet (5 mg total) by mouth daily    aspirin 81 mg chewable tablet Chew 81 mg daily    cholecalciferol (VITAMIN D3) 1,000 units tablet Take 1 tablet by mouth daily    clobetasol (TEMOVATE) 0.05 % ointment APPLY TWICE A DAY FOR 2 WEEKS, THEN AS NEEDED FLARES    fluticasone (FLONASE) 50 mcg/act nasal spray 1 spray into each nostril daily    hydrocortisone 2.5 % cream Apply topically 2 (two) times a day prn    Multiple Vitamins-Minerals (CENTRUM SILVER 50+MEN PO) Take 1 tablet by mouth daily      tamsulosin (FLOMAX) 0.4 mg TAKE 1 CAPSULE BY MOUTH EVERY DAY WITH DINNER    azelastine (ASTELIN) 0.1 % nasal spray 1 spray into each nostril 2 (two) times a day Use in each nostril as directed (Patient not taking: Reported on 11/1/2023)    azelastine (ASTELIN) 0.1 % nasal spray 1 spray into each nostril 2 (two) times a day Use in each nostril as directed (Patient not taking: Reported on 11/27/2023)    benzonatate (TESSALON) 200 MG capsule Take 1 capsule (200 mg total) by mouth 3 (three) times a day as needed for cough (Patient not taking: Reported on 12/14/2023)    Ivermectin 1 % CREA Apply topically daily as needed (Patient not taking: Reported on 11/27/2023)    pantoprazole (PROTONIX) 40 mg tablet 1 tab in AM (Patient not taking: Reported on 10/16/2023)    [DISCONTINUED] methylPREDNISolone 4 MG tablet therapy pack Use as directed on package (Patient not taking: Reported on 11/27/2023)       Objective     /68 (BP Location: Left arm, Patient Position: Sitting, Cuff Size: Adult)   Pulse 85   Temp 97.7 °F (36.5 °C)   Ht 6' (1.829 m)   Wt 103 kg (226 lb 12.8 oz)   SpO2 95%   BMI 30.76 kg/m²     Physical Exam  Vitals and nursing note reviewed. Constitutional:       General: He is not in acute distress. Appearance: Normal appearance. He is not ill-appearing. HENT:      Head: Normocephalic and atraumatic. Right Ear: Tympanic membrane normal.      Left Ear: Tympanic membrane normal.      Nose: Congestion present. Mouth/Throat:      Mouth: Mucous membranes are moist.      Pharynx: Oropharynx is clear. No oropharyngeal exudate or posterior oropharyngeal erythema.    Cardiovascular:      Rate and Rhythm: Normal rate and regular rhythm. Pulses: Normal pulses. Heart sounds: No murmur heard. Pulmonary:      Effort: Pulmonary effort is normal. No respiratory distress. Breath sounds: Wheezing (bilaterally throughout all lung fields) present. Musculoskeletal:      Right lower leg: No edema. Left lower leg: No edema. Skin:     General: Skin is warm and dry. Capillary Refill: Capillary refill takes less than 2 seconds. Coloration: Skin is not pale. Neurological:      General: No focal deficit present. Mental Status: He is alert and oriented to person, place, and time.    Psychiatric:         Mood and Affect: Mood normal.         Behavior: Behavior normal.         Judgment: Judgment normal.       Kris Michel PA-C

## 2023-12-27 ENCOUNTER — HOSPITAL ENCOUNTER (OUTPATIENT)
Dept: CT IMAGING | Facility: HOSPITAL | Age: 78
Discharge: HOME | End: 2023-12-27
Payer: MEDICARE

## 2023-12-27 DIAGNOSIS — R05.3 CHRONIC COUGH: ICD-10-CM

## 2023-12-27 PROCEDURE — 71250 CT THORAX DX C-: CPT

## 2023-12-27 PROCEDURE — G1004 CDSM NDSC: HCPCS

## 2024-01-05 DIAGNOSIS — R05.3 CHRONIC COUGH: Primary | ICD-10-CM

## 2024-01-05 DIAGNOSIS — J84.10 CALCIFIED GRANULOMA OF LUNG (HCC): ICD-10-CM

## 2024-01-08 ENCOUNTER — APPOINTMENT (OUTPATIENT)
Dept: LAB | Facility: CLINIC | Age: 79
End: 2024-01-08
Payer: MEDICARE

## 2024-01-08 DIAGNOSIS — R73.09 ABNORMAL BLOOD SUGAR: ICD-10-CM

## 2024-01-08 DIAGNOSIS — R97.20 ELEVATED PSA: ICD-10-CM

## 2024-01-08 LAB
ALBUMIN SERPL BCP-MCNC: 4.2 G/DL (ref 3.5–5)
ALP SERPL-CCNC: 47 U/L (ref 34–104)
ALT SERPL W P-5'-P-CCNC: 23 U/L (ref 7–52)
ANION GAP SERPL CALCULATED.3IONS-SCNC: 8 MMOL/L
AST SERPL W P-5'-P-CCNC: 27 U/L (ref 13–39)
BILIRUB SERPL-MCNC: 0.66 MG/DL (ref 0.2–1)
BUN SERPL-MCNC: 9 MG/DL (ref 5–25)
CALCIUM SERPL-MCNC: 9.6 MG/DL (ref 8.4–10.2)
CHLORIDE SERPL-SCNC: 106 MMOL/L (ref 96–108)
CO2 SERPL-SCNC: 30 MMOL/L (ref 21–32)
CREAT SERPL-MCNC: 0.83 MG/DL (ref 0.6–1.3)
EST. AVERAGE GLUCOSE BLD GHB EST-MCNC: 126 MG/DL
GFR SERPL CREATININE-BSD FRML MDRD: 83 ML/MIN/1.73SQ M
GLUCOSE P FAST SERPL-MCNC: 100 MG/DL (ref 65–99)
HBA1C MFR BLD: 6 %
POTASSIUM SERPL-SCNC: 4.2 MMOL/L (ref 3.5–5.3)
PROT SERPL-MCNC: 7.4 G/DL (ref 6.4–8.4)
SODIUM SERPL-SCNC: 144 MMOL/L (ref 135–147)

## 2024-01-08 PROCEDURE — 80053 COMPREHEN METABOLIC PANEL: CPT

## 2024-01-08 PROCEDURE — 84153 ASSAY OF PSA TOTAL: CPT

## 2024-01-08 PROCEDURE — 84154 ASSAY OF PSA FREE: CPT

## 2024-01-08 PROCEDURE — 36415 COLL VENOUS BLD VENIPUNCTURE: CPT

## 2024-01-08 PROCEDURE — 83036 HEMOGLOBIN GLYCOSYLATED A1C: CPT

## 2024-01-09 ENCOUNTER — PATIENT MESSAGE (OUTPATIENT)
Dept: FAMILY MEDICINE CLINIC | Facility: CLINIC | Age: 79
End: 2024-01-09

## 2024-01-09 ENCOUNTER — TELEPHONE (OUTPATIENT)
Dept: UROLOGY | Facility: CLINIC | Age: 79
End: 2024-01-09

## 2024-01-09 DIAGNOSIS — R97.20 ELEVATED PSA: Primary | ICD-10-CM

## 2024-01-09 LAB
PSA FREE MFR SERPL: 17.6 %
PSA FREE SERPL-MCNC: 1.04 NG/ML
PSA SERPL-MCNC: 5.9 NG/ML (ref 0–4)

## 2024-01-09 NOTE — TELEPHONE ENCOUNTER
received labs for markos's patient  psa consistently higher this year than last  now psa 5.9, previously 3  recommend mpMRI prostate for further workup and f/u any provider for results and planning

## 2024-01-09 NOTE — TELEPHONE ENCOUNTER
Spoke with patient. He was informed of his continuous rise in his PSA. Patient was also instructed to have MRI of prostate done w and w/o contrast. He was provided with the number for central scheduling to make this appt. A follow up appt for his elevated PSA and for his MRI results was given to pt on 2/8/24 at 9am with Dr. Muñoz.

## 2024-01-10 ENCOUNTER — TELEPHONE (OUTPATIENT)
Dept: HEMATOLOGY ONCOLOGY | Facility: CLINIC | Age: 79
End: 2024-01-10

## 2024-01-10 NOTE — PATIENT COMMUNICATION
P/C to Leonel regarding referral to see Pulmonology.    [facility] [place] [phone] information given to patient.

## 2024-01-10 NOTE — TELEPHONE ENCOUNTER
Patient Call    Who are you speaking with? Patient    If it is not the patient, are they listed on an active communication consent form? N/A   What is the reason for this call? Patient has some questions reguarding the MRIs he has scheduled    Does this require a call back? Yes   If a call back is required, please list [facility] call back number [phone]   If a call back is required, advise that a message will be forwarded to their care team and someone will return their call as soon as possible.   Did you relay this information to the patient? Yes

## 2024-01-11 NOTE — TELEPHONE ENCOUNTER
Spoke to patient. He asked if the MRI on 3/18 could be combined with this MRI prostate he is having on 1/18. I spoke to central scheduling this morning and they checked and they cannot combine them. I spoke to the patient and made him aware. He verbalized understanding and thanks.

## 2024-01-18 ENCOUNTER — HOSPITAL ENCOUNTER (OUTPATIENT)
Facility: MEDICAL CENTER | Age: 79
Discharge: HOME | End: 2024-01-18
Payer: MEDICARE

## 2024-01-18 DIAGNOSIS — R97.20 ELEVATED PSA: ICD-10-CM

## 2024-01-18 PROCEDURE — 72197 MRI PELVIS W/O & W/DYE: CPT

## 2024-01-18 PROCEDURE — A9585 GADOBUTROL INJECTION: HCPCS | Performed by: PHYSICIAN ASSISTANT

## 2024-01-18 PROCEDURE — G1004 CDSM NDSC: HCPCS

## 2024-01-18 PROCEDURE — 76377 3D RENDER W/INTRP POSTPROCES: CPT

## 2024-01-18 RX ORDER — GADOBUTROL 604.72 MG/ML
10 INJECTION INTRAVENOUS
Status: COMPLETED | OUTPATIENT
Start: 2024-01-18 | End: 2024-01-18

## 2024-01-18 RX ADMIN — GADOBUTROL 10 ML: 604.72 INJECTION INTRAVENOUS at 11:05

## 2024-01-26 PROBLEM — R05.8 RECURRENT PRODUCTIVE COUGH: Status: RESOLVED | Noted: 2020-03-12 | Resolved: 2024-01-26

## 2024-01-31 ENCOUNTER — CONSULT (OUTPATIENT)
Dept: PULMONOLOGY | Facility: CLINIC | Age: 79
End: 2024-01-31
Payer: MEDICARE

## 2024-01-31 VITALS
SYSTOLIC BLOOD PRESSURE: 110 MMHG | RESPIRATION RATE: 16 BRPM | OXYGEN SATURATION: 96 % | BODY MASS INDEX: 30.37 KG/M2 | TEMPERATURE: 97.5 F | WEIGHT: 224.2 LBS | HEIGHT: 72 IN | HEART RATE: 79 BPM | DIASTOLIC BLOOD PRESSURE: 72 MMHG

## 2024-01-31 DIAGNOSIS — R39.11 BENIGN PROSTATIC HYPERPLASIA WITH URINARY HESITANCY: ICD-10-CM

## 2024-01-31 DIAGNOSIS — R05.3 CHRONIC COUGH: ICD-10-CM

## 2024-01-31 DIAGNOSIS — J84.10 PULMONARY GRANULOMA (HCC): ICD-10-CM

## 2024-01-31 DIAGNOSIS — J84.10 CALCIFIED GRANULOMA OF LUNG (HCC): ICD-10-CM

## 2024-01-31 DIAGNOSIS — C83.39 PRIMARY CUTANEOUS ANAPLASTIC LARGE CELL B-CELL LYMPHOMA (HCC): ICD-10-CM

## 2024-01-31 DIAGNOSIS — N40.1 BENIGN PROSTATIC HYPERPLASIA WITH URINARY HESITANCY: ICD-10-CM

## 2024-01-31 DIAGNOSIS — R06.09 DOE (DYSPNEA ON EXERTION): Primary | ICD-10-CM

## 2024-01-31 PROBLEM — K21.9 GASTROESOPHAGEAL REFLUX DISEASE WITHOUT ESOPHAGITIS: Status: RESOLVED | Noted: 2023-05-10 | Resolved: 2024-01-31

## 2024-01-31 PROCEDURE — 99204 OFFICE O/P NEW MOD 45 MIN: CPT | Performed by: INTERNAL MEDICINE

## 2024-01-31 RX ORDER — ALBUTEROL SULFATE AND BUDESONIDE 90; 80 UG/1; UG/1
1 AEROSOL, METERED RESPIRATORY (INHALATION) EVERY 6 HOURS
Qty: 10.7 G | Refills: 0 | Status: SHIPPED | COMMUNITY
Start: 2024-01-31

## 2024-01-31 NOTE — ASSESSMENT & PLAN NOTE
I explained to the patient the pathophysiology of granulomas.  What he has is minimal calcified granulomas which is benign condition and does not need any further intervention or follow-up.  Also these granulomas are so tiny and does not cause any symptoms and not responsible for his cough or dyspnea on exertion.

## 2024-01-31 NOTE — ASSESSMENT & PLAN NOTE
Could be combination of postnasal drip and possible underlying new onset asthma especially with the dyspnea on exertion and wheezing recently.  I asked the patient to start using his Flonase again since it is helpful and to stop it whenever he started to have some mild epistaxis and then he can continue on that on as needed basis

## 2024-01-31 NOTE — ASSESSMENT & PLAN NOTE
With the MRI finding and increased PSA there is a chance that this is focal prostate cancer, patient is aware, he has a follow-up with urology.

## 2024-01-31 NOTE — PROGRESS NOTES
Consultation - Pulmonary Medicine   Leonel Epperson 79 y.o. male MRN: [id number]    Physician Requesting Consult: Lelo Ruff PA-C   Reason for Consult: Chronic cough, ALANIS    Pulmonary granuloma (HCC)  I explained to the patient the pathophysiology of granulomas.  What he has is minimal calcified granulomas which is benign condition and does not need any further intervention or follow-up.  Also these granulomas are so tiny and does not cause any symptoms and not responsible for his cough or dyspnea on exertion.    Chronic cough  Could be combination of postnasal drip and possible underlying new onset asthma especially with the dyspnea on exertion and wheezing recently.  I asked the patient to start using his Flonase again since it is helpful and to stop it whenever he started to have some mild epistaxis and then he can continue on that on as needed basis    ALANIS (dyspnea on exertion)  Given his history of allergies and atopy with possible eczema this could be a new onset asthma although not usual to start at his age.  But he also mentions wheezing and improvement on albuterol probably.  I decided to give him a trial of inhaled corticosteroids, we had samples of airsupra and I gave him samples and I told him to use as needed, if he feels fine on the samples then we can continue as needed in the future with inhaled steroid/LABA or ALEKSANDRA, probably it will be a as needed treatment in the future and I do not believe he will need maintenance therapy all the time.    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      Return in about 4 months (around 5/31/2024).    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 male who presents for evaluation of chronic cough and dyspnea exertion and granulomas on the chest CT scan.    She has chronic cough for more than 30 years, he has postnasal drip and allergic rhinitis, he feels that his postnasal drip is causing him to clear his throat all the time and he does not have severe cough.  He tried Flonase and azelastine and probably ipratropium in the past with improvement of symptoms but he usually develops some mild epistaxis.  He has been taking Zyrtec recently and it helped.    Over the past few months he started to have some dyspnea on exertion with intermittent wheezing but without chest pain or tightness, denies change of his cough, no recent viral URI or other sickness or infections.  He was prescribed as needed albuterol and he felt it helped a little bit but not sure.  He denies legs edema or orthopnea, denies fever chills or night sweats.  Patient has no history of pulmonary disease and never had asthma, no family history of asthma    Patient denies history of GERD, denies dysphagia or choking with food.    Patient has a large prostate and he is on Flomax and recently his PSA increased a little bit and had MRI of the prostate and will follow-up with urology in 1 week.    Patient had cutaneous anaplastic large B-cell lymphoma on his left thigh 2 years ago treated with radiation therapy and  currently under surveillance.    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    Review of Systems:  Review of Systems   Constitutional: Negative.    HENT: Negative.     Eyes: Negative.    Respiratory:  Positive for cough, shortness of breath and wheezing.    Cardiovascular: Negative.    Gastrointestinal: Negative.    Endocrine: Negative.    Genitourinary: Negative.    Musculoskeletal: Negative.    Skin: Negative.    Allergic/Immunologic: Negative.    Neurological: Negative.    Hematological: Negative.    Psychiatric/Behavioral: Negative.       Aside from what is mentioned in the HPI, the review of systems otherwise negative.    Current Medications:    Current Outpatient Medications:     amLODIPine (NORVASC) 5 mg tablet, Take 1 tablet (5 mg total) by mouth daily, Disp: 90 tablet, Rfl: 3    aspirin 81 mg chewable tablet, Chew 81 mg daily, Disp: , Rfl:     cholecalciferol (VITAMIN D3) 1,000 units tablet, Take 1 tablet by mouth daily, Disp: , Rfl:     clobetasol (TEMOVATE) 0.05 % ointment, APPLY TWICE A DAY FOR 2 WEEKS, THEN AS NEEDED FLARES, Disp: , Rfl: 1    hydrocortisone 2.5 % cream, Apply topically 2 (two) times a day prn, Disp: 30 g, Rfl: 2    Multiple Vitamins-Minerals (CENTRUM SILVER 50+MEN PO), Take 1 tablet by mouth daily  , Disp: , Rfl:     tamsulosin (FLOMAX) 0.4 mg, TAKE 1 CAPSULE BY MOUTH EVERY DAY WITH DINNER, Disp: 90 capsule, Rfl: 3    albuterol (PROVENTIL HFA,VENTOLIN HFA) 90 mcg/act inhaler, Inhale 2 puffs every 6 (six) hours as needed for wheezing or shortness of breath, Disp: 8 g, Rfl: 0    azelastine (ASTELIN) 0.1 % nasal spray, 1 spray into each nostril 2 (two) times a day Use in each nostril as directed (Patient not taking: Reported on 11/1/2023), Disp: 1 mL, Rfl: 0    azelastine (ASTELIN) 0.1 % nasal spray, 1 spray into each nostril 2 (two) times a day Use in each nostril  as directed (Patient not taking: Reported on 11/27/2023), Disp: 30 mL, Rfl: 5    benzonatate (TESSALON) 200 MG capsule, Take 1 capsule (200 mg total) by mouth 3 (three) times a day as needed for cough (Patient not taking: Reported on 12/14/2023), Disp: 40 capsule, Rfl: 1    fluticasone (FLONASE) 50 mcg/act nasal spray, 1 spray into each nostril daily, Disp: 9.9 mL, Rfl: 2    Ivermectin 1 % CREA, Apply topically daily as needed (Patient not taking: Reported on 11/27/2023), Disp: , Rfl:     pantoprazole (PROTONIX) 40 mg tablet, 1 tab in AM (Patient not taking: Reported on 10/16/2023), Disp: 30 tablet, Rfl: 2    Historical Information   Past Medical History:   Diagnosis Date    Abnormal blood chemistry     last assessed: 5/1/2013    Abnormal blood sugar     last assessed: 12/18/2014    Allergic rhinitis     last assessed: 11/8/2013    Basal cell carcinoma      Face    Cancer (HCC)     Diabetes mellitus (HCC)     last assessed: 4/30/2013    Hypertension     Low back pain     Nodule of finger of right hand     last assessed: 10/19/2015    Obstruction of right eustachian tube     last assessed: 10/11/2016     Past Surgical History:   Procedure Laterality Date    CATARACT EXTRACTION, BILATERAL      COLONOSCOPY  1999 1 polyp    COLONOSCOPY  2007    Hyperplastic polyp    COLONOSCOPY  2009     And 2013 Normal    COLONOSCOPY  12/29/2017    15 mm polyp proximal sigmoid colon-tubular adenoma.  Diverticulosis by Dr. Rich    INGUINAL HERNIA REPAIR      SPINE SURGERY  9/14/2020     Social History   Social History     Tobacco Use   Smoking Status Never   Smokeless Tobacco Never       Occupational history:  No occupational exposure    Family History:   Family History   Problem Relation Age of Onset    Heart failure Mother     No Known Problems Father     Colon cancer Maternal Grandmother         or stomach (unsure)    Cancer Maternal Grandmother     Colon cancer Son 42        2x and liver         PhysicalExamination:  Vitals:  "  /72 (BP Location: Left arm, Patient Position: Sitting, Cuff Size: Adult)   Pulse 79   Temp 97.5 °F (36.4 °C) (Tympanic)   Resp 16   Ht 6' (1.829 m)   Wt 102 kg (224 lb 3.2 oz)   SpO2 96%   BMI 30.41 kg/m²     Gen:  Comfortable on room air.  No conversational dyspnea  HEENT:  Conjugate gaze.  sclerae anicteric.  Oropharynx moist  Neck: Trachea is midline. No JVD. No adenopathy  Chest: Equal breath sounds and clear to auscultation bilaterally, no wheezing or crackles  Cardiac: S1-S2 regular. no murmur  Abdomen:  benign  Extremities: No edema  Neuro:  Normal speech and mentation      Diagnostic Data:  Labs:  I personally reviewed the most recent laboratory data pertinent to today's visit    Lab Results   Component Value Date    WBC 5.71 08/14/2023    HGB 15.7 08/14/2023    HCT 46.4 08/14/2023    MCV 93 08/14/2023     08/14/2023     Lab Results   Component Value Date    GLUCOSE 95 07/06/2015    CALCIUM 9.6 01/08/2024     07/06/2015    K 4.2 01/08/2024    CO2 30 01/08/2024     01/08/2024    BUN 9 01/08/2024    CREATININE 0.83 01/08/2024     No results found for: \"IGE\"  Lab Results   Component Value Date    ALT 23 01/08/2024    AST 27 01/08/2024    ALKPHOS 47 01/08/2024    BILITOT 0.56 07/06/2015       Imaging:  I personally reviewed the images on the PAC system pertinent to today's visit  Chest CT scan reviewed on PACS with the patient in the room: Clear lung parenchyma with no abnormalities except of scattered calcified granulomas, the largest was in the medial aspect of the left lower lobe near the descending aorta    Other studies:  Echocardiogram: LVEF 55%, grade 1 diastolic dysfunction, normal RV, mild AR      Fabio Castillo MD  "

## 2024-01-31 NOTE — ASSESSMENT & PLAN NOTE
Given his history of allergies and atopy with possible eczema this could be a new onset asthma although not usual to start at his age.  But he also mentions wheezing and improvement on albuterol probably.  I decided to give him a trial of inhaled corticosteroids, we had samples of airsupra and I gave him samples and I told him to use as needed, if he feels fine on the samples then we can continue as needed in the future with inhaled steroid/LABA or ALEKSANDRA, probably it will be a as needed treatment in the future and I do not believe he will need maintenance therapy all the time.

## 2024-02-07 DIAGNOSIS — N40.1 BENIGN PROSTATIC HYPERPLASIA WITH URINARY HESITANCY: ICD-10-CM

## 2024-02-07 DIAGNOSIS — R39.11 BENIGN PROSTATIC HYPERPLASIA WITH URINARY HESITANCY: ICD-10-CM

## 2024-02-07 RX ORDER — TAMSULOSIN HYDROCHLORIDE 0.4 MG/1
CAPSULE ORAL
Qty: 90 CAPSULE | Refills: 3 | Status: SHIPPED | OUTPATIENT
Start: 2024-02-07

## 2024-02-08 ENCOUNTER — OFFICE VISIT (OUTPATIENT)
Dept: UROLOGY | Facility: MEDICAL CENTER | Age: 79
End: 2024-02-08
Payer: MEDICARE

## 2024-02-08 VITALS
SYSTOLIC BLOOD PRESSURE: 140 MMHG | DIASTOLIC BLOOD PRESSURE: 80 MMHG | OXYGEN SATURATION: 96 % | HEIGHT: 72 IN | BODY MASS INDEX: 29.8 KG/M2 | HEART RATE: 81 BPM | WEIGHT: 220 LBS

## 2024-02-08 DIAGNOSIS — N40.1 BPH WITH URINARY OBSTRUCTION: ICD-10-CM

## 2024-02-08 DIAGNOSIS — N13.8 BPH WITH URINARY OBSTRUCTION: ICD-10-CM

## 2024-02-08 DIAGNOSIS — R97.20 ELEVATED PSA: Primary | ICD-10-CM

## 2024-02-08 PROBLEM — C85.90 LYMPHOMA (HCC): Status: ACTIVE | Noted: 2021-09-30

## 2024-02-08 PROCEDURE — 99214 OFFICE O/P EST MOD 30 MIN: CPT | Performed by: UROLOGY

## 2024-02-08 NOTE — PATIENT INSTRUCTIONS
Do a PSA in August.  If the number is good, you do not need to see me then.  Follow-up in 1 year with another PSA

## 2024-02-08 NOTE — PROGRESS NOTES
"   HISTORY:    Follow-up elevated PSA, and recent MRI which showed category 3 lesion.    Symptoms are well-controlled on tamsulosin.  Good stream control, occasional urgency, nocturia x 1 or 2.    BPH with large prostate, 95 mL on MRI.    See PSA numbers below, slight rise up to 5.9         ASSESSMENT / PLAN:    Lengthy discussion regarding PSA elevation, the size of his prostate which is quite large, and the risk of cancer.    The MRI would suggest there is a slight chance of prostate cancer.    We discussed that most cases of prostate cancer at this age are non-aggressive and can be observed  There are rare exceptions which prostate cancer can be aggressive.    Overall, patient and I are both comfortable checking the PSA again in 6 months.  If it is stable, we will continue to follow.    If PSA rises more, we will discuss further regarding biopsy    The following portions of the patient's history were reviewed and updated as appropriate: allergies, current medications, past family history, past medical history, past social history, past surgical history, and problem list.    Review of Systems      Objective:     Physical Exam  Genitourinary:     Comments: Penis testes normal    Prostate markedly enlarged no nodules          0   Lab Value Date/Time    PSA 5.9 (H) 01/08/2024 1024    PSA 4.78 (H) 06/29/2023 0923    PSA 4.4 (H) 03/30/2023 0933    PSA 5.0 (H) 02/07/2023 0918    PSA 4.0 09/28/2022 0934    PSA 4.8 (H) 08/03/2022 0904    PSA 3.2 08/02/2021 1004    PSA 2.8 07/14/2020 0925    PSA 1.6 07/06/2015 0930    PSA 1.2 07/02/2014 0854   ]  BUN   Date Value Ref Range Status   01/08/2024 9 5 - 25 mg/dL Final   09/30/2021 16 7 - 28 mg/dL Final     Creatinine   Date Value Ref Range Status   01/08/2024 0.83 0.60 - 1.30 mg/dL Final     Comment:     Standardized to IDMS reference method   09/30/2021 0.96 0.53 - 1.30 mg/dL Final     No components found for: \"CBC\"      Patient Active Problem List   Diagnosis    Benign " essential hypertension    Abnormal blood sugar    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(HCC)    Arthritis    Benign prostatic hyperplasia with urinary hesitancy    Cervical strain    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)    Radiculopathy, lumbar region    Spinal stenosis of lumbar region without neurogenic claudication    Chronic bilateral low back pain without sciatica    Chronic pain syndrome    Nonintractable headache    Chronic pain of right thumb    Vasovagal syncope    Palpitations    Elevated PSA    Kidney lesion, native, left    Pancreatic lesion    Memory problem    Dilated aortic root (HCC)    Eczema    Hemorrhoids    Right wrist pain    Chronic cough    ALANIS (dyspnea on exertion)    Pulmonary granuloma (HCC)        Diagnoses and all orders for this visit:    Elevated PSA  -     PSA Total, Diagnostic; Future    BPH with urinary obstruction           Patient ID: Leonel Epperson is a 79 y.o. male.      Current Outpatient Medications:     Albuterol-Budesonide (Airsupra) 90-80 MCG/ACT AERO, Inhale 1 puff every 6 (six) hours, Disp: 10.7 g, Rfl: 0    amLODIPine (NORVASC) 5 mg tablet, Take 1 tablet (5 mg total) by mouth daily, Disp: 90 tablet, Rfl: 3    aspirin 81 mg chewable tablet, Chew 81 mg daily, Disp: , Rfl:     cholecalciferol (VITAMIN D3) 1,000 units tablet, Take 1 tablet by mouth daily, Disp: , Rfl:     clobetasol (TEMOVATE) 0.05 % ointment, APPLY TWICE A DAY FOR 2 WEEKS, THEN AS NEEDED FLARES, Disp: , Rfl: 1    hydrocortisone 2.5 % cream, Apply topically 2 (two) times a day prn, Disp: 30 g, Rfl: 2    Multiple Vitamins-Minerals (CENTRUM SILVER 50+MEN PO), Take 1 tablet by mouth daily  , Disp: , Rfl:     tamsulosin (FLOMAX) 0.4 mg, TAKE 1 CAPSULE BY MOUTH EVERY DAY WITH DINNER, Disp: 90 capsule, Rfl: 3    albuterol (PROVENTIL HFA,VENTOLIN HFA) 90 mcg/act inhaler, Inhale 2 puffs every 6 (six) hours as needed for wheezing or shortness of breath (Patient not taking: Reported on 2/8/2024), Disp: 8 g,  Rfl: 0    fluticasone (FLONASE) 50 mcg/act nasal spray, 1 spray into each nostril daily (Patient not taking: Reported on 2/8/2024), Disp: 9.9 mL, Rfl: 2    Past Medical History:   Diagnosis Date    Abnormal blood chemistry     last assessed: 5/1/2013    Abnormal blood sugar     last assessed: 12/18/2014    Allergic rhinitis     last assessed: 11/8/2013    Basal cell carcinoma      Face    Cancer (HCC)     Diabetes mellitus (HCC)     last assessed: 4/30/2013    Hypertension     Low back pain     Nodule of finger of right hand     last assessed: 10/19/2015    Obstruction of right eustachian tube     last assessed: 10/11/2016       Past Surgical History:   Procedure Laterality Date    CATARACT EXTRACTION, BILATERAL      COLONOSCOPY  1999    1 polyp    COLONOSCOPY  2007    Hyperplastic polyp    COLONOSCOPY  2009     And 2013 Normal    COLONOSCOPY  12/29/2017    15 mm polyp proximal sigmoid colon-tubular adenoma.  Diverticulosis by Dr. Rich    INGUINAL HERNIA REPAIR      SPINE SURGERY  9/14/2020       Social History

## 2024-02-23 ENCOUNTER — RA CDI HCC (OUTPATIENT)
Dept: OTHER | Facility: HOSPITAL | Age: 79
End: 2024-02-23

## 2024-02-29 ENCOUNTER — OFFICE VISIT (OUTPATIENT)
Dept: FAMILY MEDICINE CLINIC | Facility: CLINIC | Age: 79
End: 2024-02-29
Payer: MEDICARE

## 2024-02-29 VITALS
BODY MASS INDEX: 30.38 KG/M2 | SYSTOLIC BLOOD PRESSURE: 118 MMHG | TEMPERATURE: 97.5 F | RESPIRATION RATE: 18 BRPM | DIASTOLIC BLOOD PRESSURE: 78 MMHG | HEART RATE: 83 BPM | OXYGEN SATURATION: 97 % | WEIGHT: 224 LBS

## 2024-02-29 DIAGNOSIS — K86.9 PANCREATIC LESION: ICD-10-CM

## 2024-02-29 DIAGNOSIS — C85.80: ICD-10-CM

## 2024-02-29 DIAGNOSIS — R97.20 ELEVATED PSA: Primary | ICD-10-CM

## 2024-02-29 DIAGNOSIS — Z13.220 SCREENING CHOLESTEROL LEVEL: ICD-10-CM

## 2024-02-29 DIAGNOSIS — R73.09 ABNORMAL BLOOD SUGAR: ICD-10-CM

## 2024-02-29 DIAGNOSIS — I10 BENIGN ESSENTIAL HYPERTENSION: ICD-10-CM

## 2024-02-29 DIAGNOSIS — R06.09 DOE (DYSPNEA ON EXERTION): ICD-10-CM

## 2024-02-29 DIAGNOSIS — M48.061 SPINAL STENOSIS OF LUMBAR REGION WITHOUT NEUROGENIC CLAUDICATION: ICD-10-CM

## 2024-02-29 DIAGNOSIS — I77.810 DILATED AORTIC ROOT (HCC): ICD-10-CM

## 2024-02-29 PROCEDURE — 99214 OFFICE O/P EST MOD 30 MIN: CPT | Performed by: FAMILY MEDICINE

## 2024-02-29 NOTE — ASSESSMENT & PLAN NOTE
History of dilated aortic root of 3.9.  Blood pressure well-controlled.  Continue follow-up with cardiologist as directed

## 2024-02-29 NOTE — ASSESSMENT & PLAN NOTE
Still with some intermittent dyspnea on exertion.  Being followed by pulmonologist.  Recently started on Airsupra with some benefit.

## 2024-02-29 NOTE — PROGRESS NOTES
Name: Leonel Epperson      : 1945      MRN: [id number]  Encounter Provider: Boni Saldana DO  Encounter Date: 2024   Encounter department: [facility]    Assessment & Plan     1. Elevated PSA  Assessment & Plan:  PSA from  was 5.9.  This has been steadily rising.  Patient had MRI showing small chance of malignancy.  Being followed by urology with active surveillance (Dr. Muñoz)      2. ALANIS (dyspnea on exertion)  Assessment & Plan:  Still with some intermittent dyspnea on exertion.  Being followed by pulmonologist.  Recently started on Airsupra with some benefit.      3. 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(HCC)  Assessment & Plan:  Stable.  Still being followed by dermatologist regularly (Dr Mccarthy)      4. Pancreatic lesion  Assessment & Plan:  Patient with pancreatic lesion on MRI suggestive of IPMN.  Being followed by surgical oncology      5. Abnormal blood sugar  Assessment & Plan:  A1c  6.0%.  This has been stable for over 5 years.  Continue to moderate carbohydrate intake and exercise regularly.  Will continue to monitor    Orders:  -     Comprehensive metabolic panel; Future; Expected date: 2024  -     Hemoglobin A1C; Future; Expected date: 2024    6. Benign essential hypertension  Assessment & Plan:  Blood pressure well-controlled since cardiologist restarted patient's amlodipine 5 mg daily    Orders:  -     CBC and differential; Future; Expected date: 2024  -     TSH, 3rd generation with Free T4 reflex; Future; Expected date: 2024    7. Spinal stenosis of lumbar region without neurogenic claudication  Assessment & Plan:  Status post spinal decompression surgery .      8. Dilated aortic root (HCC)  Assessment & Plan:  History of dilated aortic root of 3.9.  Blood pressure well-controlled.  Continue follow-up with cardiologist as directed      9. Screening cholesterol level  -     Lipid Panel with Direct LDL reflex; Future; Expected date:  08/01/2024         Patient had COVID booster this season  Patient had flu shot this season  Patient had RSV vaccination this season  Patient current with pneumonia vaccine  Last tetanus booster 2022  Patient had Shingrix vaccination    Lab results from January 8 reviewed with patient    6 months, fasting blood work prior  Subjective     Patient presents for recheck chronic medical problems.  Still having occasional dyspnea on exertion, otherwise feeling pretty well.  Still being followed by urology, dermatology, pulmonology, cardiology, and surgical oncology.  Patient had labs drawn on January 8      Review of Systems   Respiratory:  Positive for shortness of breath.    Cardiovascular: Negative.    Gastrointestinal: Negative.    Genitourinary: Negative.        Past Medical History:   Diagnosis Date   • Abnormal blood chemistry     last assessed: 5/1/2013   • Abnormal blood sugar     last assessed: 12/18/2014   • Allergic rhinitis     last assessed: 11/8/2013   • Basal cell carcinoma      Face   • Cancer (HCC)    • Diabetes mellitus (HCC)     last assessed: 4/30/2013   • Hypertension    • Low back pain    • Nodule of finger of right hand     last assessed: 10/19/2015   • Obstruction of right eustachian tube     last assessed: 10/11/2016     Past Surgical History:   Procedure Laterality Date   • CATARACT EXTRACTION, BILATERAL     • COLONOSCOPY  1999 1 polyp   • COLONOSCOPY  2007    Hyperplastic polyp   • COLONOSCOPY  2009     And 2013 Normal   • COLONOSCOPY  12/29/2017    15 mm polyp proximal sigmoid colon-tubular adenoma.  Diverticulosis by Dr. Rich   • INGUINAL HERNIA REPAIR     • SPINE SURGERY  9/14/2020     Family History   Problem Relation Age of Onset   • Heart failure Mother    • No Known Problems Father    • Colon cancer Maternal Grandmother         or stomach (unsure)   • Cancer Maternal Grandmother    • Colon cancer Son 42        2x and liver     Social History     Socioeconomic History   • Marital  status: /Civil Union     Spouse name: None   • Number of children: None   • Years of education: None   • Highest education level: None   Occupational History   • Occupation: RETIRED-   Tobacco Use   • Smoking status: Never   • Smokeless tobacco: Never   Vaping Use   • Vaping status: Never Used   Substance and Sexual Activity   • Alcohol use: Yes   • Drug use: No   • Sexual activity: Not Currently     Partners: Female     Birth control/protection: None   Other Topics Concern   • None   Social History Narrative    DOES NOT CONSUME CAFFEINE     Social Determinants of Health     Financial Resource Strain: Low Risk  (8/21/2023)    Overall Financial Resource Strain (CARDIA)    • Difficulty of Paying Living Expenses: Not very hard   Food Insecurity: Not on file   Transportation Needs: No Transportation Needs (8/21/2023)    PRAPARE - Transportation    • Lack of Transportation (Medical): No    • Lack of Transportation (Non-Medical): No   Physical Activity: Not on file   Stress: Not on file   Social Connections: Not on file   Intimate Partner Violence: Not on file   Housing Stability: Not on file     Current Outpatient Medications on File Prior to Visit   Medication Sig   • Albuterol-Budesonide (Airsupra) 90-80 MCG/ACT AERO Inhale 1 puff every 6 (six) hours   • amLODIPine (NORVASC) 5 mg tablet Take 1 tablet (5 mg total) by mouth daily   • aspirin 81 mg chewable tablet Chew 81 mg daily   • cholecalciferol (VITAMIN D3) 1,000 units tablet Take 1 tablet by mouth daily   • clobetasol (TEMOVATE) 0.05 % ointment APPLY TWICE A DAY FOR 2 WEEKS, THEN AS NEEDED FLARES   • fluticasone (FLONASE) 50 mcg/act nasal spray 1 spray into each nostril daily (Patient taking differently: 1 spray into each nostril daily as needed)   • hydrocortisone 2.5 % cream Apply topically 2 (two) times a day prn   • Multiple Vitamins-Minerals (CENTRUM SILVER 50+MEN PO) Take 1 tablet by mouth daily     • tamsulosin (FLOMAX) 0.4 mg TAKE 1 CAPSULE BY MOUTH  EVERY DAY WITH DINNER   • albuterol (PROVENTIL HFA,VENTOLIN HFA) 90 mcg/act inhaler Inhale 2 puffs every 6 (six) hours as needed for wheezing or shortness of breath (Patient not taking: Reported on 2/29/2024)     Allergies   Allergen Reactions   • Doxycycline    • Amoxicillin Diarrhea   • Prednisone Palpitations     Medrol Dose pack ok    • Sulfamethoxazole-Trimethoprim      Septra     Immunization History   Administered Date(s) Administered   • COVID-19 MODERNA VACC 0.5 ML IM 01/11/2021, 02/08/2021, 10/22/2021, 04/01/2022   • COVID-19 Moderna Vac BIVALENT 12 Yr+ IM 0.5 ML 09/06/2022   • COVID-19 Pfizer mRNA vacc PF tyrel-sucrose 12 yr and older (Comirnaty) 09/25/2023   • COVID-19, unspecified 10/22/2021, 04/01/2022   • INFLUENZA 09/22/2015, 09/19/2016, 09/15/2018, 09/26/2019, 10/14/2020, 09/24/2021, 10/12/2022, 09/22/2023   • Influenza Split High Dose Preservative Free IM 10/10/2014, 09/22/2015, 09/19/2016, 08/31/2017   • Influenza, high dose seasonal 0.7 mL 09/26/2019, 10/14/2020   • Influenza, seasonal, injectable 09/14/2012   • Pneumococcal Conjugate 13-Valent 07/09/2015   • Pneumococcal Polysaccharide PPV23 11/01/2006, 01/29/2020   • Respiratory Syncytial Virus Vaccine (Recombinant) 01/03/2024   • Tdap 09/01/2010, 08/12/2022   • Zoster 07/18/2008   • Zoster Vaccine Recombinant 04/15/2019, 06/17/2019       Objective     /78 (BP Location: Left arm, Patient Position: Sitting, Cuff Size: Standard)   Pulse 83   Temp 97.5 °F (36.4 °C) (Temporal)   Resp 18   Wt 102 kg (224 lb)   SpO2 97%   BMI 30.38 kg/m²     Physical Exam  Cardiovascular:      Rate and Rhythm: Normal rate and regular rhythm.      Heart sounds: Normal heart sounds.      Comments: Carotids: no bruits  Ext: no edema  Pulmonary:      Effort: Pulmonary effort is normal. No respiratory distress.      Breath sounds: No wheezing or rales.   Psychiatric:         Behavior: Behavior normal.         Thought Content: Thought content normal.        Boni Saldana, DO

## 2024-02-29 NOTE — ASSESSMENT & PLAN NOTE
PSA from January 8 was 5.9.  This has been steadily rising.  Patient had MRI showing small chance of malignancy.  Being followed by urology with active surveillance (Dr. Muñoz)

## 2024-02-29 NOTE — ASSESSMENT & PLAN NOTE
Patient calling, asking for return phone call. Patient with pancreatic lesion on MRI suggestive of IPMN.  Being followed by surgical oncology

## 2024-03-14 ENCOUNTER — APPOINTMENT (OUTPATIENT)
Dept: LAB | Facility: CLINIC | Age: 79
End: 2024-03-14
Payer: MEDICARE

## 2024-03-14 DIAGNOSIS — K86.9 PANCREATIC LESION: ICD-10-CM

## 2024-03-14 LAB
ANION GAP SERPL CALCULATED.3IONS-SCNC: 7 MMOL/L (ref 4–13)
BUN SERPL-MCNC: 14 MG/DL (ref 5–25)
CALCIUM SERPL-MCNC: 8.9 MG/DL (ref 8.4–10.2)
CHLORIDE SERPL-SCNC: 105 MMOL/L (ref 96–108)
CO2 SERPL-SCNC: 28 MMOL/L (ref 21–32)
CREAT SERPL-MCNC: 0.95 MG/DL (ref 0.6–1.3)
GFR SERPL CREATININE-BSD FRML MDRD: 75 ML/MIN/1.73SQ M
GLUCOSE P FAST SERPL-MCNC: 92 MG/DL (ref 65–99)
POTASSIUM SERPL-SCNC: 3.8 MMOL/L (ref 3.5–5.3)
SODIUM SERPL-SCNC: 140 MMOL/L (ref 135–147)

## 2024-03-14 PROCEDURE — 80048 BASIC METABOLIC PNL TOTAL CA: CPT

## 2024-03-14 PROCEDURE — 36415 COLL VENOUS BLD VENIPUNCTURE: CPT

## 2024-03-18 ENCOUNTER — HOSPITAL ENCOUNTER (OUTPATIENT)
Dept: MRI IMAGING | Facility: HOSPITAL | Age: 79
Discharge: HOME | End: 2024-03-18
Attending: SURGERY
Payer: MEDICARE

## 2024-03-18 DIAGNOSIS — K86.9 PANCREATIC LESION: ICD-10-CM

## 2024-03-18 PROCEDURE — 74183 MRI ABD W/O CNTR FLWD CNTR: CPT

## 2024-03-18 PROCEDURE — G1004 CDSM NDSC: HCPCS

## 2024-03-18 PROCEDURE — A9585 GADOBUTROL INJECTION: HCPCS | Performed by: SURGERY

## 2024-03-18 RX ORDER — GADOBUTROL 604.72 MG/ML
10 INJECTION INTRAVENOUS
Status: COMPLETED | OUTPATIENT
Start: 2024-03-18 | End: 2024-03-18

## 2024-03-18 RX ADMIN — GADOBUTROL 10 ML: 604.72 INJECTION INTRAVENOUS at 08:29

## 2024-03-29 ENCOUNTER — OFFICE VISIT (OUTPATIENT)
Dept: SURGICAL ONCOLOGY | Facility: CLINIC | Age: 79
End: 2024-03-29
Payer: MEDICARE

## 2024-03-29 VITALS
TEMPERATURE: 97.8 F | BODY MASS INDEX: 30.07 KG/M2 | HEIGHT: 72 IN | HEART RATE: 83 BPM | RESPIRATION RATE: 16 BRPM | SYSTOLIC BLOOD PRESSURE: 122 MMHG | WEIGHT: 222 LBS | OXYGEN SATURATION: 96 % | DIASTOLIC BLOOD PRESSURE: 76 MMHG

## 2024-03-29 DIAGNOSIS — K86.9 PANCREATIC LESION: Primary | ICD-10-CM

## 2024-03-29 PROCEDURE — 99213 OFFICE O/P EST LOW 20 MIN: CPT | Performed by: SURGERY

## 2024-03-29 NOTE — PROGRESS NOTES
Surgical Oncology Follow Up       [address]  [facility] [place]  [address]  [facility] [address]    Leonel Epperson  1945  [id number]  [address]  [facility] [place]  [address] [place] [place] [address]    Chief Complaint   Patient presents with    Follow-up       Assessment/Plan:    No problem-specific Assessment & Plan notes found for this encounter.       Diagnoses and all orders for this visit:    Pancreatic lesion  -     MRI abdomen w wo contrast and mrcp; Future  -     Basic metabolic panel; Future      Advance Care Planning/Advance Directives:  Discussed disease status, cancer treatment plans and/or cancer treatment goals with the patient.     Oncology History    No history exists.       History of Present Illness: 79-year-old with pancreatic cysts here for follow-up visit.  -Interval History: Plans or symptoms to report.  He had an MRI done in anticipation of today's visit.    Review of Systems:  Review of Systems   Constitutional: Negative.    HENT: Negative.     Eyes: Negative.    Respiratory: Negative.     Cardiovascular: Negative.    Gastrointestinal: Negative.    Endocrine: Negative.    Genitourinary: Negative.    Musculoskeletal: Negative.    Skin: Negative.    Allergic/Immunologic: Negative.    Neurological: Negative.    Hematological: Negative.    Psychiatric/Behavioral: Negative.     All other systems reviewed and are negative.      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)    Radiculopathy, lumbar region    Spinal stenosis of lumbar region without neurogenic claudication    Chronic bilateral low back pain without sciatica    Chronic pain syndrome    Nonintractable headache     Chronic pain of right thumb    Vasovagal syncope    Palpitations    Elevated PSA    Kidney lesion, native, left    Pancreatic lesion    Memory problem    Dilated aortic root (HCC)    Eczema    Hemorrhoids    Right wrist pain    Chronic cough    ALANIS (dyspnea on exertion)    Pulmonary granuloma (HCC)    Lymphoma (HCC)     Past Medical History:   Diagnosis Date    Abnormal blood chemistry     last assessed: 5/1/2013    Abnormal blood sugar     last assessed: 12/18/2014    Allergic rhinitis     last assessed: 11/8/2013    Basal cell carcinoma      Face    Cancer (HCC)     Diabetes mellitus (HCC)     last assessed: 4/30/2013    Hypertension     Low back pain     Nodule of finger of right hand     last assessed: 10/19/2015    Obstruction of right eustachian tube     last assessed: 10/11/2016     Past Surgical History:   Procedure Laterality Date    CATARACT EXTRACTION, BILATERAL      COLONOSCOPY  1999 1 polyp    COLONOSCOPY  2007    Hyperplastic polyp    COLONOSCOPY  2009     And 2013 Normal    COLONOSCOPY  12/29/2017    15 mm polyp proximal sigmoid colon-tubular adenoma.  Diverticulosis by Dr. Rich    INGUINAL HERNIA REPAIR      SPINE SURGERY  9/14/2020     Family History   Problem Relation Age of Onset    Heart failure Mother     No Known Problems Father     Colon cancer Maternal Grandmother         or stomach (unsure)    Cancer Maternal Grandmother     Colon cancer Son 42        2x and liver     Social History     Socioeconomic History    Marital status: /Civil Union     Spouse name: Not on file    Number of children: Not on file    Years of education: Not on file    Highest education level: Not on file   Occupational History    Occupation: RETIRED-   Tobacco Use    Smoking status: Never    Smokeless tobacco: Never   Vaping Use    Vaping status: Never Used   Substance and Sexual Activity    Alcohol use: Yes    Drug use: No    Sexual activity: Not Currently     Partners: Female     Birth  control/protection: None   Other Topics Concern    Not on file   Social History Narrative    DOES NOT CONSUME CAFFEINE     Social Determinants of Health     Financial Resource Strain: Low Risk  (8/21/2023)    Overall Financial Resource Strain (CARDIA)     Difficulty of Paying Living Expenses: Not very hard   Food Insecurity: Not on file   Transportation Needs: No Transportation Needs (8/21/2023)    PRAPARE - Transportation     Lack of Transportation (Medical): No     Lack of Transportation (Non-Medical): No   Physical Activity: Not on file   Stress: Not on file   Social Connections: Not on file   Intimate Partner Violence: Not on file   Housing Stability: Not on file       Current Outpatient Medications:     Albuterol-Budesonide (Airsupra) 90-80 MCG/ACT AERO, Inhale 1 puff every 6 (six) hours, Disp: 10.7 g, Rfl: 0    amLODIPine (NORVASC) 5 mg tablet, Take 1 tablet (5 mg total) by mouth daily, Disp: 90 tablet, Rfl: 3    aspirin 81 mg chewable tablet, Chew 81 mg daily, Disp: , Rfl:     cholecalciferol (VITAMIN D3) 1,000 units tablet, Take 1 tablet by mouth daily, Disp: , Rfl:     clobetasol (TEMOVATE) 0.05 % ointment, APPLY TWICE A DAY FOR 2 WEEKS, THEN AS NEEDED FLARES, Disp: , Rfl: 1    hydrocortisone 2.5 % cream, Apply topically 2 (two) times a day prn, Disp: 30 g, Rfl: 2    Multiple Vitamins-Minerals (CENTRUM SILVER 50+MEN PO), Take 1 tablet by mouth daily  , Disp: , Rfl:     tamsulosin (FLOMAX) 0.4 mg, TAKE 1 CAPSULE BY MOUTH EVERY DAY WITH DINNER, Disp: 90 capsule, Rfl: 3    albuterol (PROVENTIL HFA,VENTOLIN HFA) 90 mcg/act inhaler, Inhale 2 puffs every 6 (six) hours as needed for wheezing or shortness of breath (Patient not taking: Reported on 2/29/2024), Disp: 8 g, Rfl: 0    fluticasone (FLONASE) 50 mcg/act nasal spray, 1 spray into each nostril daily (Patient taking differently: 1 spray into each nostril daily as needed), Disp: 9.9 mL, Rfl: 2  Allergies   Allergen Reactions    Doxycycline     Amoxicillin  Diarrhea    Prednisone Palpitations     Medrol Dose pack ok     Sulfamethoxazole-Trimethoprim      Septra     Vitals:    03/29/24 0903   BP: 122/76   Pulse: 83   Resp: 16   Temp: 97.8 °F (36.6 °C)   SpO2: 96%       Physical Exam  Vitals reviewed.   Constitutional:       Appearance: Normal appearance.   HENT:      Head: Normocephalic and atraumatic.      Nose: Nose normal.      Mouth/Throat:      Mouth: Mucous membranes are moist.   Eyes:      Extraocular Movements: Extraocular movements intact.      Pupils: Pupils are equal, round, and reactive to light.   Cardiovascular:      Rate and Rhythm: Regular rhythm.      Pulses: Normal pulses.      Heart sounds: Normal heart sounds.   Pulmonary:      Effort: Pulmonary effort is normal.      Breath sounds: Normal breath sounds.   Abdominal:      General: Abdomen is flat.      Palpations: Abdomen is soft.   Musculoskeletal:         General: Normal range of motion.      Cervical back: Normal range of motion and neck supple.   Skin:     General: Skin is warm and dry.   Neurological:      General: No focal deficit present.      Mental Status: He is alert and oriented to person, place, and time.   Psychiatric:         Mood and Affect: Mood normal.         Behavior: Behavior normal.           Results:  Labs:  none    Imaging  MRI abdomen w wo contrast and mrcp    Result Date: 3/25/2024  Narrative: MRI OF THE ABDOMEN WITH AND WITHOUT CONTRAST WITH MRCP INDICATION: 79 years / Male. K86.9: Disease of pancreas, unspecified. COMPARISON: MRI abdomen 1/17/2023. TECHNIQUE: Multiplanar/multisequence MRI of the abdomen with 3D MRCP was performed before and after administration of contrast. IV Contrast: 10 mL of Gadobutrol injection (SINGLE-DOSE) DIAGNOSTIC QUALITY: Many of the images are severely limited by motion artifact. FINDINGS: LOWER CHEST: Unremarkable. LIVER: Normal in size and configuration. No suspicious mass. Reidentified 15 mm segment 7 hemangioma #6/12. BILE DUCTS: No  intrahepatic or extrahepatic bile duct dilation. Common bile duct is normal in caliber. No choledocholithiasis, biliary stricture or suspicious mass. GALLBLADDER: Normal PANCREAS: Moderate pancreatic parenchymal atrophy. Unchanged pancreatic parenchymal cysts measuring up to 10 mm in the pancreatic body #5/20 without suspicious characteristics. No pancreatic ductal dilation. ADRENAL GLANDS: Unremarkable. SPLEEN: Normal. KIDNEYS/PROXIMAL URETERS: No hydroureteronephrosis. No suspicious renal mass. Simple cysts. BOWEL: No dilated loops of bowel. PERITONEUM/RETROPERITONEUM: No ascites. LYMPH NODES: No abdominal lymphadenopathy. VESSELS: No aneurysm. ABDOMINAL WALL: Unremarkable BONES: No suspicious osseous lesion.     Impression: Motion limited study. Unchanged 10 mm and smaller pancreatic cysts. For simple cyst(s) less than 1.5 cm, recommend follow-up every 2 years for 2 times. After 4 years, no more follow-ups. Recommend next follow-up in 2 years. Preferred imaging modality: abdomen MRI and MRCP with and without IV contrast, or triple phase abdomen CT with IV contrast, or abdomen MRI and MRCP without IV contrast. Workstation performed: [id number]     I reviewed the above laboratory and imaging data.    Discussion/Summary:79-year-old man, pancreatic cyst, stable in size and appearance.  Follow-up in 2 years with MRI/MRCP at that time.  All questions answered and copy of reports given to patient for his records.

## 2024-06-24 ENCOUNTER — OFFICE VISIT (OUTPATIENT)
Dept: PULMONOLOGY | Facility: CLINIC | Age: 79
End: 2024-06-24
Payer: MEDICARE

## 2024-06-24 VITALS
HEIGHT: 73 IN | OXYGEN SATURATION: 97 % | TEMPERATURE: 98.3 F | HEART RATE: 72 BPM | BODY MASS INDEX: 30.46 KG/M2 | SYSTOLIC BLOOD PRESSURE: 120 MMHG | DIASTOLIC BLOOD PRESSURE: 68 MMHG | WEIGHT: 229.8 LBS

## 2024-06-24 DIAGNOSIS — R97.20 ELEVATED PSA: ICD-10-CM

## 2024-06-24 DIAGNOSIS — R05.3 CHRONIC COUGH: ICD-10-CM

## 2024-06-24 DIAGNOSIS — R06.09 DOE (DYSPNEA ON EXERTION): ICD-10-CM

## 2024-06-24 DIAGNOSIS — C83.39 PRIMARY CUTANEOUS ANAPLASTIC LARGE CELL B-CELL LYMPHOMA (HCC): ICD-10-CM

## 2024-06-24 DIAGNOSIS — J84.10 PULMONARY GRANULOMA (HCC): Primary | ICD-10-CM

## 2024-06-24 PROCEDURE — G2211 COMPLEX E/M VISIT ADD ON: HCPCS | Performed by: INTERNAL MEDICINE

## 2024-06-24 PROCEDURE — 99214 OFFICE O/P EST MOD 30 MIN: CPT | Performed by: INTERNAL MEDICINE

## 2024-06-24 NOTE — PROGRESS NOTES
Progress note - Pulmonary Medicine   Leonel Epperson 79 y.o. male MRN: [id number]       Impression & Plan:     Pulmonary granuloma (HCC)  Benign, no further workup or intervention.    ALANIS (dyspnea on exertion)  Resolved, not sure if this is secondary to underlying mild asthma but for now we will treat as below.    Chronic cough  Most likely secondary to postnasal drip.  I encouraged him to use Flonase as needed specially at nighttime.  He mentioned wheezing when he lays down which could be due to postnasal drip and if does not improve with the Flonase I gave him a sample of air supra to try at night and as needed in the future and he will let me know if he needs prescription.  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      Return in about 1 year (around 6/24/2025).    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    In general Leonel is doing fine, his cough has resolved although from time to time he will clear his sputum in the morning, he still has some postnasal drip.  He uses Flonase briefly but now stopped.  He reports minimal wheezing sometimes when he lays down to sleep at night but otherwise denies dyspnea on exertion or wheezing during the day.  Denies chest pain or fever or chills or night sweats.  In general his symptoms improved significantly.    Otherwise he has possible prostate cancer based on MRI without biopsy  and he is under surveillance by his urologist and started on Flomax.  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      Current Medications:    Current Outpatient Medications:     amLODIPine (NORVASC) 5 mg tablet, Take 1 tablet (5 mg total) by mouth daily, Disp: 90 tablet, Rfl: 3    aspirin 81 mg chewable tablet, Chew 81 mg daily, Disp: , Rfl:     cholecalciferol (VITAMIN D3) 1,000 units tablet, Take 1 tablet by mouth daily, Disp: , Rfl:     clobetasol (TEMOVATE) 0.05 % ointment, APPLY TWICE A DAY FOR 2 WEEKS, THEN AS NEEDED FLARES, Disp: , Rfl: 1    hydrocortisone 2.5 % cream, Apply topically 2 (two) times a day prn, Disp: 30 g, Rfl: 2    Multiple Vitamins-Minerals (CENTRUM SILVER 50+MEN PO), Take 1 tablet by mouth daily  , Disp: , Rfl:     tamsulosin (FLOMAX) 0.4 mg, TAKE 1 CAPSULE BY MOUTH EVERY DAY WITH DINNER, Disp: 90 capsule, Rfl: 3    albuterol (PROVENTIL HFA,VENTOLIN HFA) 90 mcg/act inhaler, Inhale 2 puffs every 6 (six) hours as needed for wheezing or shortness of breath (Patient not taking: Reported on 2/29/2024), Disp: 8 g, Rfl: 0    Albuterol-Budesonide (Airsupra) 90-80 MCG/ACT AERO, Inhale 1 puff every 6 (six) hours, Disp: 10.7 g, Rfl: 0    fluticasone (FLONASE) 50 mcg/act nasal spray, 1 spray into each nostril daily (Patient taking differently: 1 spray into each nostril daily as needed), Disp: 9.9 mL, Rfl: 2    Review of Systems:  Review of Systems   Constitutional: Negative.  Negative for appetite change and fever.   HENT:  Positive for postnasal drip, rhinorrhea and sneezing. Negative for ear pain, sore throat and trouble swallowing.    Eyes: Negative.    Respiratory:  Positive for cough.    Cardiovascular: Negative.  Negative for chest pain.   Gastrointestinal: Negative.    Endocrine: Negative.    Genitourinary: Negative.    Musculoskeletal: Negative.  Negative for  "myalgias.   Skin: Negative.    Allergic/Immunologic: Negative.    Neurological: Negative.  Negative for headaches.   Hematological: Negative.    Psychiatric/Behavioral: Negative.       Aside from what is mentioned in the HPI, the review of systems is otherwise negative    Past medical history, surgical history, and family history were reviewed and updated as appropriate    Social history updates:  Social History     Tobacco Use   Smoking Status Never   Smokeless Tobacco Never       PhysicalExamination:  Vitals:   /68 (BP Location: Left arm, Patient Position: Sitting, Cuff Size: Large)   Pulse 72   Temp 98.3 °F (36.8 °C) (Tympanic)   Ht 6' 1\" (1.854 m)   Wt 104 kg (229 lb 12.8 oz)   SpO2 97%   BMI 30.32 kg/m²     Gen:  Comfortable on room air.  No conversational dyspnea  HEENT:  Conjugate gaze.  sclerae anicteric.  Oropharynx moist  Neck: Trachea is midline. No JVD. No adenopathy  Chest: Equal breath sounds and clear to auscultation bilaterally, no wheezing or crackles  Cardiac: S1-S2 regular. no murmur  Abdomen:  benign  Extremities: No edema  Neuro:  Normal speech and mentation    Diagnostic Data:  Labs:  I personally reviewed the most recent laboratory data pertinent to today's visit    Lab Results   Component Value Date    WBC 5.71 08/14/2023    HGB 15.7 08/14/2023    HCT 46.4 08/14/2023    MCV 93 08/14/2023     08/14/2023     Lab Results   Component Value Date    SODIUM 140 03/14/2024    K 3.8 03/14/2024    CO2 28 03/14/2024     03/14/2024    BUN 14 03/14/2024    CREATININE 0.95 03/14/2024    GLUCOSE 95 07/06/2015    CALCIUM 8.9 03/14/2024       Imaging:  I personally reviewed the images on the PAC system pertinent to today's visit  Chest CT scan reviewed on PACS with the patient in the room: Clear lung parenchyma with no abnormalities except of scattered calcified granulomas, the largest was in the medial aspect of the left lower lobe near the descending aorta     Other " studies:  Echocardiogram: LVEF 55%, grade 1 diastolic dysfunction, normal RV, mild AR          Fabio Castillo MD  Answers submitted by the patient for this visit:  Pulmonology Questionnaire (Submitted on 6/17/2024)  Chief Complaint: Primary symptoms  Do you experience frequent throat clearing?: Yes  Chronicity: chronic  When did you first notice your symptoms?: more than 1 month ago  How often do your symptoms occur?: constantly  Since you first noticed this problem, how has it changed?: gradually improving  Do you have shortness of breath that occurs with effort or exertion?: Yes  Do you have ear congestion?: No  Do you have heartburn?: No  Do you have fatigue?: No  Do you have nasal congestion?: Yes  Do you have shortness of breath when lying flat?: No  Do you have shortness of breath when you wake up?: No  Do you have sweats?: No  Have you experienced weight loss?: No  Which of the following makes your symptoms worse?: climbing stairs, pollen

## 2024-06-24 NOTE — ASSESSMENT & PLAN NOTE
Most likely secondary to postnasal drip.  I encouraged him to use Flonase as needed specially at nighttime.  He mentioned wheezing when he lays down which could be due to postnasal drip and if does not improve with the Flonase I gave him a sample of air supra to try at night and as needed in the future and he will let me know if he needs prescription.  In general even if he has asthma it is very mild

## 2024-06-24 NOTE — ASSESSMENT & PLAN NOTE
Possible focal prostate cancer on MRI, no biopsy yet, continue surveillance and management as per urology and currently on Flomax.

## 2024-06-24 NOTE — ASSESSMENT & PLAN NOTE
Resolved, not sure if this is secondary to underlying mild asthma but for now we will treat as below.

## 2024-08-09 ENCOUNTER — APPOINTMENT (OUTPATIENT)
Dept: LAB | Facility: CLINIC | Age: 79
End: 2024-08-09
Payer: MEDICARE

## 2024-08-09 DIAGNOSIS — R97.20 ELEVATED PSA: ICD-10-CM

## 2024-08-09 DIAGNOSIS — I10 BENIGN ESSENTIAL HYPERTENSION: ICD-10-CM

## 2024-08-09 DIAGNOSIS — R73.09 ABNORMAL BLOOD SUGAR: ICD-10-CM

## 2024-08-09 DIAGNOSIS — Z13.220 SCREENING CHOLESTEROL LEVEL: ICD-10-CM

## 2024-08-09 LAB
ALBUMIN SERPL BCG-MCNC: 4 G/DL (ref 3.5–5)
ALP SERPL-CCNC: 44 U/L (ref 34–104)
ALT SERPL W P-5'-P-CCNC: 17 U/L (ref 7–52)
ANION GAP SERPL CALCULATED.3IONS-SCNC: 4 MMOL/L (ref 4–13)
AST SERPL W P-5'-P-CCNC: 21 U/L (ref 13–39)
BASOPHILS # BLD AUTO: 0.02 THOUSANDS/ÂΜL (ref 0–0.1)
BASOPHILS NFR BLD AUTO: 0 % (ref 0–1)
BILIRUB SERPL-MCNC: 0.77 MG/DL (ref 0.2–1)
BUN SERPL-MCNC: 12 MG/DL (ref 5–25)
CALCIUM SERPL-MCNC: 9.1 MG/DL (ref 8.4–10.2)
CHLORIDE SERPL-SCNC: 106 MMOL/L (ref 96–108)
CHOLEST SERPL-MCNC: 144 MG/DL
CO2 SERPL-SCNC: 30 MMOL/L (ref 21–32)
CREAT SERPL-MCNC: 0.94 MG/DL (ref 0.6–1.3)
EOSINOPHIL # BLD AUTO: 0.42 THOUSAND/ÂΜL (ref 0–0.61)
EOSINOPHIL NFR BLD AUTO: 8 % (ref 0–6)
ERYTHROCYTE [DISTWIDTH] IN BLOOD BY AUTOMATED COUNT: 14.2 % (ref 11.6–15.1)
EST. AVERAGE GLUCOSE BLD GHB EST-MCNC: 120 MG/DL
GFR SERPL CREATININE-BSD FRML MDRD: 76 ML/MIN/1.73SQ M
GLUCOSE P FAST SERPL-MCNC: 85 MG/DL (ref 65–99)
HBA1C MFR BLD: 5.8 %
HCT VFR BLD AUTO: 46.7 % (ref 36.5–49.3)
HDLC SERPL-MCNC: 51 MG/DL
HGB BLD-MCNC: 15.2 G/DL (ref 12–17)
IMM GRANULOCYTES # BLD AUTO: 0.01 THOUSAND/UL (ref 0–0.2)
IMM GRANULOCYTES NFR BLD AUTO: 0 % (ref 0–2)
LDLC SERPL CALC-MCNC: 77 MG/DL (ref 0–100)
LYMPHOCYTES # BLD AUTO: 1.52 THOUSANDS/ÂΜL (ref 0.6–4.47)
LYMPHOCYTES NFR BLD AUTO: 30 % (ref 14–44)
MCH RBC QN AUTO: 30.6 PG (ref 26.8–34.3)
MCHC RBC AUTO-ENTMCNC: 32.5 G/DL (ref 31.4–37.4)
MCV RBC AUTO: 94 FL (ref 82–98)
MONOCYTES # BLD AUTO: 0.67 THOUSAND/ÂΜL (ref 0.17–1.22)
MONOCYTES NFR BLD AUTO: 13 % (ref 4–12)
NEUTROPHILS # BLD AUTO: 2.43 THOUSANDS/ÂΜL (ref 1.85–7.62)
NEUTS SEG NFR BLD AUTO: 49 % (ref 43–75)
NRBC BLD AUTO-RTO: 0 /100 WBCS
PLATELET # BLD AUTO: 183 THOUSANDS/UL (ref 149–390)
PMV BLD AUTO: 9.2 FL (ref 8.9–12.7)
POTASSIUM SERPL-SCNC: 4.1 MMOL/L (ref 3.5–5.3)
PROT SERPL-MCNC: 7.3 G/DL (ref 6.4–8.4)
PSA SERPL-MCNC: 7.17 NG/ML (ref 0–4)
RBC # BLD AUTO: 4.97 MILLION/UL (ref 3.88–5.62)
SODIUM SERPL-SCNC: 140 MMOL/L (ref 135–147)
TRIGL SERPL-MCNC: 81 MG/DL
TSH SERPL DL<=0.05 MIU/L-ACNC: 1.16 UIU/ML (ref 0.45–4.5)
WBC # BLD AUTO: 5.07 THOUSAND/UL (ref 4.31–10.16)

## 2024-08-09 PROCEDURE — 80053 COMPREHEN METABOLIC PANEL: CPT

## 2024-08-09 PROCEDURE — 85025 COMPLETE CBC W/AUTO DIFF WBC: CPT

## 2024-08-09 PROCEDURE — 36415 COLL VENOUS BLD VENIPUNCTURE: CPT

## 2024-08-09 PROCEDURE — 83036 HEMOGLOBIN GLYCOSYLATED A1C: CPT

## 2024-08-09 PROCEDURE — 84443 ASSAY THYROID STIM HORMONE: CPT

## 2024-08-09 PROCEDURE — 80061 LIPID PANEL: CPT

## 2024-08-09 PROCEDURE — 84153 ASSAY OF PSA TOTAL: CPT

## 2024-08-14 ENCOUNTER — TELEPHONE (OUTPATIENT)
Dept: UROLOGY | Facility: MEDICAL CENTER | Age: 79
End: 2024-08-14

## 2024-08-14 DIAGNOSIS — R97.20 ELEVATED PSA: Primary | ICD-10-CM

## 2024-08-14 PROBLEM — Z98.890 STATUS POST LUMBAR SPINE SURGERY FOR DECOMPRESSION OF SPINAL CORD: Status: ACTIVE | Noted: 2020-09-14

## 2024-08-14 PROBLEM — I10 HYPERTENSION: Status: ACTIVE | Noted: 2021-09-30

## 2024-08-14 NOTE — TELEPHONE ENCOUNTER
Telephone call regarding rising PSA, now 7.1    See prior notes    We have decided to do PSA again in 6 months and then decide if he needs biopsy.    Orders on the chart.

## 2024-08-23 ENCOUNTER — RA CDI HCC (OUTPATIENT)
Dept: OTHER | Facility: HOSPITAL | Age: 79
End: 2024-08-23

## 2024-08-29 ENCOUNTER — OFFICE VISIT (OUTPATIENT)
Dept: FAMILY MEDICINE CLINIC | Facility: CLINIC | Age: 79
End: 2024-08-29
Payer: MEDICARE

## 2024-08-29 VITALS
DIASTOLIC BLOOD PRESSURE: 70 MMHG | HEART RATE: 76 BPM | WEIGHT: 230.4 LBS | HEIGHT: 72 IN | BODY MASS INDEX: 31.21 KG/M2 | SYSTOLIC BLOOD PRESSURE: 126 MMHG | OXYGEN SATURATION: 96 % | TEMPERATURE: 97.5 F

## 2024-08-29 DIAGNOSIS — I10 BENIGN ESSENTIAL HYPERTENSION: Primary | ICD-10-CM

## 2024-08-29 DIAGNOSIS — I77.810 DILATED AORTIC ROOT (HCC): ICD-10-CM

## 2024-08-29 DIAGNOSIS — Z00.00 MEDICARE ANNUAL WELLNESS VISIT, SUBSEQUENT: ICD-10-CM

## 2024-08-29 DIAGNOSIS — R97.20 ELEVATED PSA: ICD-10-CM

## 2024-08-29 DIAGNOSIS — M48.061 SPINAL STENOSIS OF LUMBAR REGION WITHOUT NEUROGENIC CLAUDICATION: ICD-10-CM

## 2024-08-29 DIAGNOSIS — K86.9 PANCREATIC LESION: ICD-10-CM

## 2024-08-29 DIAGNOSIS — R73.09 ABNORMAL BLOOD SUGAR: ICD-10-CM

## 2024-08-29 DIAGNOSIS — C85.80: ICD-10-CM

## 2024-08-29 PROCEDURE — G0439 PPPS, SUBSEQ VISIT: HCPCS | Performed by: FAMILY MEDICINE

## 2024-08-29 PROCEDURE — 99214 OFFICE O/P EST MOD 30 MIN: CPT | Performed by: FAMILY MEDICINE

## 2024-08-29 RX ORDER — AMLODIPINE BESYLATE 5 MG/1
5 TABLET ORAL DAILY
Qty: 90 TABLET | Refills: 3 | Status: SHIPPED | OUTPATIENT
Start: 2024-08-29

## 2024-08-29 RX ORDER — CETIRIZINE HYDROCHLORIDE 5 MG/1
5 TABLET, CHEWABLE ORAL DAILY
COMMUNITY

## 2024-08-29 NOTE — PATIENT INSTRUCTIONS
Medicare Preventive Visit Patient Instructions  Thank you for completing your Welcome to Medicare Visit or Medicare Annual Wellness Visit today. Your next wellness visit will be due in one year (8/30/2025).  The screening/preventive services that you may require over the next 5-10 years are detailed below. Some tests may not apply to you based off risk factors and/or age. Screening tests ordered at today's visit but not completed yet may show as past due. Also, please note that scanned in results may not display below.  Preventive Screenings:  Service Recommendations Previous Testing/Comments   Colorectal Cancer Screening  Colonoscopy    Fecal Occult Blood Test (FOBT)/Fecal Immunochemical Test (FIT)  Fecal DNA/Cologuard Test  Flexible Sigmoidoscopy Age: 45-75 years old   Colonoscopy: every 10 years (May be performed more frequently if at higher risk)  OR  FOBT/FIT: every 1 year  OR  Cologuard: every 3 years  OR  Sigmoidoscopy: every 5 years  Screening may be recommended earlier than age 45 if at higher risk for colorectal cancer. Also, an individualized decision between you and your healthcare provider will decide whether screening between the ages of 76-85 would be appropriate. Colonoscopy: 02/25/2022  FOBT/FIT: Not on file  Cologuard: Not on file  Sigmoidoscopy: Not on file    Screening Current     Prostate Cancer Screening Individualized decision between patient and health care provider in men between ages of 55-69   Medicare will cover every 12 months beginning on the day after your 50th birthday PSA: 7.166 ng/mL     Screening Not Indicated     Hepatitis C Screening Once for adults born between 1945 and 1965  More frequently in patients at high risk for Hepatitis C Hep C Antibody: Not on file        Diabetes Screening 1-2 times per year if you're at risk for diabetes or have pre-diabetes Fasting glucose: 85 mg/dL (8/9/2024)  A1C: 5.8 % (8/9/2024)  Screening Current   Cholesterol Screening Once every 5 years if  you don't have a lipid disorder. May order more often based on risk factors. Lipid panel: 08/09/2024  Screening Current      Other Preventive Screenings Covered by Medicare:  Abdominal Aortic Aneurysm (AAA) Screening: covered once if your at risk. You're considered to be at risk if you have a family history of AAA or a male between the age of 65-75 who smoking at least 100 cigarettes in your lifetime.  Lung Cancer Screening: covers low dose CT scan once per year if you meet all of the following conditions: (1) Age 55-77; (2) No signs or symptoms of lung cancer; (3) Current smoker or have quit smoking within the last 15 years; (4) You have a tobacco smoking history of at least 20 pack years (packs per day x number of years you smoked); (5) You get a written order from a healthcare provider.  Glaucoma Screening: covered annually if you're considered high risk: (1) You have diabetes OR (2) Family history of glaucoma OR (3)  aged 50 and older OR (4)  American aged 65 and older  Osteoporosis Screening: covered every 2 years if you meet one of the following conditions: (1) Have a vertebral abnormality; (2) On glucocorticoid therapy for more than 3 months; (3) Have primary hyperparathyroidism; (4) On osteoporosis medications and need to assess response to drug therapy.  HIV Screening: covered annually if you're between the age of 15-65. Also covered annually if you are younger than 15 and older than 65 with risk factors for HIV infection. For pregnant patients, it is covered up to 3 times per pregnancy.    Immunizations:  Immunization Recommendations   Influenza Vaccine Annual influenza vaccination during flu season is recommended for all persons aged >= 6 months who do not have contraindications   Pneumococcal Vaccine   * Pneumococcal conjugate vaccine = PCV13 (Prevnar 13), PCV15 (Vaxneuvance), PCV20 (Prevnar 20)  * Pneumococcal polysaccharide vaccine = PPSV23 (Pneumovax) Adults 19-65 yo with  certain risk factors or if 65+ yo  If never received any pneumonia vaccine: recommend Prevnar 20 (PCV20)  Give PCV20 if previously received 1 dose of PCV13 or PPSV23   Hepatitis B Vaccine 3 dose series if at intermediate or high risk (ex: diabetes, end stage renal disease, liver disease)   Respiratory syncytial virus (RSV) Vaccine - COVERED BY MEDICARE PART D  * RSVPreF3 (Arexvy) CDC recommends that adults 60 years of age and older may receive a single dose of RSV vaccine using shared clinical decision-making (SCDM)   Tetanus (Td) Vaccine - COST NOT COVERED BY MEDICARE PART B Following completion of primary series, a booster dose should be given every 10 years to maintain immunity against tetanus. Td may also be given as tetanus wound prophylaxis.   Tdap Vaccine - COST NOT COVERED BY MEDICARE PART B Recommended at least once for all adults. For pregnant patients, recommended with each pregnancy.   Shingles Vaccine (Shingrix) - COST NOT COVERED BY MEDICARE PART B  2 shot series recommended in those 19 years and older who have or will have weakened immune systems or those 50 years and older     Health Maintenance Due:      Topic Date Due   • Colorectal Cancer Screening  02/25/2027     Immunizations Due:      Topic Date Due   • Influenza Vaccine (1) 09/01/2024     Advance Directives   What are advance directives?  Advance directives are legal documents that state your wishes and plans for medical care. These plans are made ahead of time in case you lose your ability to make decisions for yourself. Advance directives can apply to any medical decision, such as the treatments you want, and if you want to donate organs.   What are the types of advance directives?  There are many types of advance directives, and each state has rules about how to use them. You may choose a combination of any of the following:  Living will:  This is a written record of the treatment you want. You can also choose which treatments you do not  want, which to limit, and which to stop at a certain time. This includes surgery, medicine, IV fluid, and tube feedings.   Durable power of  for healthcare (DPAHC):  This is a written record that states who you want to make healthcare choices for you when you are unable to make them for yourself. This person, called a proxy, is usually a family member or a friend. You may choose more than 1 proxy.  Do not resuscitate (DNR) order:  A DNR order is used in case your heart stops beating or you stop breathing. It is a request not to have certain forms of treatment, such as CPR. A DNR order may be included in other types of advance directives.  Medical directive:  This covers the care that you want if you are in a coma, near death, or unable to make decisions for yourself. You can list the treatments you want for each condition. Treatment may include pain medicine, surgery, blood transfusions, dialysis, IV or tube feedings, and a ventilator (breathing machine).  Values history:  This document has questions about your views, beliefs, and how you feel and think about life. This information can help others choose the care that you would choose.  Why are advance directives important?  An advance directive helps you control your care. Although spoken wishes may be used, it is better to have your wishes written down. Spoken wishes can be misunderstood, or not followed. Treatments may be given even if you do not want them. An advance directive may make it easier for your family to make difficult choices about your care.   Weight Management   Why it is important to manage your weight:  Being overweight increases your risk of health conditions such as heart disease, high blood pressure, type 2 diabetes, and certain types of cancer. It can also increase your risk for osteoarthritis, sleep apnea, and other respiratory problems. Aim for a slow, steady weight loss. Even a small amount of weight loss can lower your risk of  health problems.  How to lose weight safely:  A safe and healthy way to lose weight is to eat fewer calories and get regular exercise. You can lose up about 1 pound a week by decreasing the number of calories you eat by 500 calories each day.   Healthy meal plan for weight management:  A healthy meal plan includes a variety of foods, contains fewer calories, and helps you stay healthy. A healthy meal plan includes the following:  Eat whole-grain foods more often.  A healthy meal plan should contain fiber. Fiber is the part of grains, fruits, and vegetables that is not broken down by your body. Whole-grain foods are healthy and provide extra fiber in your diet. Some examples of whole-grain foods are whole-wheat breads and pastas, oatmeal, brown rice, and bulgur.  Eat a variety of vegetables every day.  Include dark, leafy greens such as spinach, kale, faheem greens, and mustard greens. Eat yellow and orange vegetables such as carrots, sweet potatoes, and winter squash.   Eat a variety of fruits every day.  Choose fresh or canned fruit (canned in its own juice or light syrup) instead of juice. Fruit juice has very little or no fiber.  Eat low-fat dairy foods.  Drink fat-free (skim) milk or 1% milk. Eat fat-free yogurt and low-fat cottage cheese. Try low-fat cheeses such as mozzarella and other reduced-fat cheeses.  Choose meat and other protein foods that are low in fat.  Choose beans or other legumes such as split peas or lentils. Choose fish, skinless poultry (chicken or turkey), or lean cuts of red meat (beef or pork). Before you cook meat or poultry, cut off any visible fat.   Use less fat and oil.  Try baking foods instead of frying them. Add less fat, such as margarine, sour cream, regular salad dressing and mayonnaise to foods. Eat fewer high-fat foods. Some examples of high-fat foods include french fries, doughnuts, ice cream, and cakes.  Eat fewer sweets.  Limit foods and drinks that are high in sugar. This  includes candy, cookies, regular soda, and sweetened drinks.  Exercise:  Exercise at least 30 minutes per day on most days of the week. Some examples of exercise include walking, biking, dancing, and swimming. You can also fit in more physical activity by taking the stairs instead of the elevator or parking farther away from stores. Ask your healthcare provider about the best exercise plan for you.      © Copyright [employer] 2018 Information is for End User's use only and may not be sold, redistributed or otherwise used for commercial purposes. All illustrations and images included in CareNotes® are the copyrighted property of [employer] or [employer]

## 2024-08-29 NOTE — ASSESSMENT & PLAN NOTE
History of dilated aortic root.  Blood pressure controlled.  Continue follow-up with cardiology as directed

## 2024-08-29 NOTE — PROGRESS NOTES
Medicare wellness visit    Ambulatory Visit  Name: Leonel Epperson      : 1945      MRN: [id number]  Encounter Provider: Boni Saldana DO  Encounter Date: 2024   Encounter department: [facility]    Assessment & Plan   1. Benign essential hypertension  2. Abnormal blood sugar  3. Elevated PSA  4. 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(HCC)  5. Pancreatic lesion  6. Spinal stenosis of lumbar region without neurogenic claudication  7. Dilated aortic root (HCC)  8. Medicare annual wellness visit, subsequent       Preventive health issues were discussed with patient, and age appropriate screening tests were ordered as noted in patient's After Visit Summary. Personalized health advice and appropriate referrals for health education or preventive services given if needed, as noted in patient's After Visit Summary.    History of Present Illness     HPI   Patient Care Team:  Boni Saldana DO as PCP - General  Edvin David MD (Oncology)  Roger Ryder DO (Cardiology)  Ishaan Mccarthy MD (Dermatology)    Review of Systems  Medical History Reviewed by provider this encounter:  Tobacco  Allergies  Meds  Problems  Med Hx  Surg Hx  Fam Hx       Annual Wellness Visit Questionnaire   Leonel is here for his Subsequent Wellness visit. Last Medicare Wellness visit information reviewed, patient interviewed and updates made to the record today.      Health Risk Assessment:   Patient rates overall health as very good. Patient feels that their physical health rating is slightly worse. Patient is very satisfied with their life. Eyesight was rated as same. Hearing was rated as same. Patient feels that their emotional and mental health rating is same. Patients states they are never, rarely angry. Patient states they are never, rarely unusually tired/fatigued. Pain experienced in the last 7 days has been some. Patient's pain rating has been 2/10. Patient states that he has experienced no weight loss or gain in last  6 months.     Fall Risk Screening:   In the past year, patient has experienced: no history of falling in past year      Home Safety:  Patient does not have trouble with stairs inside or outside of their home. Patient has working smoke alarms and has no working carbon monoxide detector. Home safety hazards include: none.     Nutrition:   Current diet is Regular.     Medications:   Patient is not currently taking any over-the-counter supplements. Patient is able to manage medications.     Activities of Daily Living (ADLs)/Instrumental Activities of Daily Living (IADLs):   Walk and transfer into and out of bed and chair?: Yes  Dress and groom yourself?: Yes    Bathe or shower yourself?: Yes    Feed yourself? Yes  Do your laundry/housekeeping?: Yes  Manage your money, pay your bills and track your expenses?: Yes  Make your own meals?: Yes    Do your own shopping?: Yes    Previous Hospitalizations:   Any hospitalizations or ED visits within the last 12 months?: No      Advance Care Planning:   Living will: Yes    Durable POA for healthcare: Yes    Advanced directive: Yes    ACP document given: Yes      Cognitive Screening:   Provider or family/friend/caregiver concerned regarding cognition?: No    PREVENTIVE SCREENINGS      Cardiovascular Screening:    General: Screening Current      Diabetes Screening:     General: Screening Current      Colorectal Cancer Screening:     General: Screening Current      Prostate Cancer Screening:    General: Screening Not Indicated      Osteoporosis Screening:    General: Risks and Benefits Discussed      Abdominal Aortic Aneurysm (AAA) Screening:        General: Risks and Benefits Discussed      Lung Cancer Screening:     General: Screening Not Indicated      Hepatitis C Screening:    General: Risks and Benefits Discussed    Screening, Brief Intervention, and Referral to Treatment (SBIRT)    Screening  Typical number of drinks in a day: 0  Typical number of drinks in a week:  0  Interpretation: Low risk drinking behavior.    AUDIT-C Screenin) How often did you have a drink containing alcohol in the past year? monthly or less  2) How many drinks did you have on a typical day when you were drinking in the past year? 1 to 2  3) How often did you have 6 or more drinks on one occasion in the past year? never    AUDIT-C Score: 1  Interpretation: Score 0-3 (male): Negative screen for alcohol misuse    Single Item Drug Screening:  How often have you used an illegal drug (including marijuana) or a prescription medication for non-medical reasons in the past year? never    Single Item Drug Screen Score: 0  Interpretation: Negative screen for possible drug use disorder    Social Determinants of Health     Financial Resource Strain: Low Risk  (2023)    Overall Financial Resource Strain (CARDIA)    • Difficulty of Paying Living Expenses: Not very hard   Food Insecurity: No Food Insecurity (2024)    Hunger Vital Sign    • Worried About Running Out of Food in the Last Year: Never true    • Ran Out of Food in the Last Year: Never true   Transportation Needs: No Transportation Needs (2024)    PRAPARE - Transportation    • Lack of Transportation (Medical): No    • Lack of Transportation (Non-Medical): No   Housing Stability: Low Risk  (2024)    Housing Stability Vital Sign    • Unable to Pay for Housing in the Last Year: No    • Number of Times Moved in the Last Year: 0    • Homeless in the Last Year: No   Utilities: Not At Risk (2024)    [facility] Utilities    • Threatened with loss of utilities: No     No results found.    Objective     /70 (BP Location: Left arm, Patient Position: Sitting, Cuff Size: Adult)   Pulse 76   Temp 97.5 °F (36.4 °C)   Ht 6' (1.829 m)   Wt 105 kg (230 lb 6.4 oz)   SpO2 96%   BMI 31.25 kg/m²     Physical Exam

## 2024-08-29 NOTE — ASSESSMENT & PLAN NOTE
History of elevated PSA.  MRI showed possible focal cancer.  Plan is to continue following PSAs.  If continues to rise, will consider biopsy.  Continue follow-up with urology as directed

## 2024-08-29 NOTE — PROGRESS NOTES
Ambulatory Visit  Name: Leonel Epperson      : 1945      MRN: [id number]  Encounter Provider: Boni Saldana DO  Encounter Date: 2024   Encounter department: [facility]    Assessment & Plan   1. Benign essential hypertension  Assessment & Plan:  Blood pressure today 126/70.  Doing well on amlodipine 5 mg daily  Orders:  -     amLODIPine (NORVASC) 5 mg tablet; Take 1 tablet (5 mg total) by mouth daily  2. Abnormal blood sugar  Assessment & Plan:  A1c stable at 5.8%.  Continue reduced carb diet and exercise.  Will continue to follow  Orders:  -     Comprehensive metabolic panel; Future; Expected date: 2025  -     Hemoglobin A1C; Future; Expected date: 2025  3. Elevated PSA  Assessment & Plan:  History of elevated PSA.  MRI showed possible focal cancer.  Plan is to continue following PSAs.  If continues to rise, will consider biopsy.  Continue follow-up with urology as directed  4. 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(HCC)  Assessment & Plan:  Continue regular follow-up with his dermatologist (Dr Mccarthy)  5. Pancreatic lesion  Assessment & Plan:  History of MRI suggestive of IPMN.  Being followed by surgical oncology with serial MRIs  6. Spinal stenosis of lumbar region without neurogenic claudication  Assessment & Plan:  Status post spinal decompression surgery in   7. Dilated aortic root (HCC)  Assessment & Plan:  History of dilated aortic root.  Blood pressure controlled.  Continue follow-up with cardiology as directed  Orders:  -     Ambulatory Referral to Cardiology; Future  -     amLODIPine (NORVASC) 5 mg tablet; Take 1 tablet (5 mg total) by mouth daily  8. Medicare annual wellness visit, subsequent       Patient presents for AWV and med check appointment today.  He was in the emergency room yesterday due to atypical chest pain.  Workup was negative.  Otherwise he has been doing well.  He is compliant on prescribed medications.  He had labs drawn on .  On examination,  blood pressure 126/70.  Remainder of exam was unremarkable.  Recommend continue healthy diet and regular exercise program.    Last colonoscopy 2022.  No further colon cancer screening indicated less patient is having symptoms    Current with pneumonia vaccination  Last tetanus booster 2022  Patient had Shingrix vaccination    Lab results from August 9 reviewed with patient    6 months, CMP/A1c prior      History of Present Illness     Patient presents for AWV and med check appointment today.  He was in the emergency room yesterday due to atypical chest pain.  Workup was negative.  Otherwise he has been doing well.  He is compliant on prescribed medications.  He had labs drawn on August 9.      Review of Systems   Respiratory: Negative.     Cardiovascular: Negative.    Gastrointestinal: Negative.    Genitourinary: Negative.      Past Medical History:   Diagnosis Date   • Abnormal blood chemistry     last assessed: 5/1/2013   • Abnormal blood sugar     last assessed: 12/18/2014   • Allergic    • Allergic rhinitis     last assessed: 11/8/2013   • Arthritis    • Basal cell carcinoma      Face   • Cancer (HCC)    • Diabetes mellitus (HCC)     last assessed: 4/30/2013   • Headache(784.0)    • Hypertension    • Low back pain    • Nodule of finger of right hand     last assessed: 10/19/2015   • Obstruction of right eustachian tube     last assessed: 10/11/2016     Past Surgical History:   Procedure Laterality Date   • CATARACT EXTRACTION, BILATERAL     • COLONOSCOPY  1999 1 polyp   • COLONOSCOPY  2007    Hyperplastic polyp   • COLONOSCOPY  2009     And 2013 Normal   • COLONOSCOPY  12/29/2017    15 mm polyp proximal sigmoid colon-tubular adenoma.  Diverticulosis by Dr. Rich   • INGUINAL HERNIA REPAIR     • SPINE SURGERY  9/14/2020     Family History   Problem Relation Age of Onset   • Heart failure Mother    • Arthritis Mother    • Vision loss Mother    • Dementia Father    • Arthritis Father    • Colon cancer Maternal  Grandmother         or stomach (unsure)   • Cancer Maternal Grandmother    • Colon cancer Son 42        2x and liver   • Heart disease Sister         Heart valve replaced   • Heart disease Brother         Heart valve problem     Social History     Tobacco Use   • Smoking status: Never   • Smokeless tobacco: Never   Vaping Use   • Vaping status: Never Used   Substance and Sexual Activity   • Alcohol use: Yes     Comment: Couple beers a year   • Drug use: No   • Sexual activity: Not Currently     Partners: Female     Birth control/protection: None     Current Outpatient Medications on File Prior to Visit   Medication Sig   • aspirin 81 mg chewable tablet Chew 81 mg daily   • cetirizine (ZyrTEC) 5 MG chewable tablet Chew 5 mg daily   • cholecalciferol (VITAMIN D3) 1,000 units tablet Take 1 tablet by mouth daily   • clobetasol (TEMOVATE) 0.05 % ointment APPLY TWICE A DAY FOR 2 WEEKS, THEN AS NEEDED FLARES   • Multiple Vitamins-Minerals (CENTRUM SILVER 50+MEN PO) Take 1 tablet by mouth daily     • tamsulosin (FLOMAX) 0.4 mg TAKE 1 CAPSULE BY MOUTH EVERY DAY WITH DINNER   • [DISCONTINUED] amLODIPine (NORVASC) 5 mg tablet Take 1 tablet (5 mg total) by mouth daily   • albuterol (PROVENTIL HFA,VENTOLIN HFA) 90 mcg/act inhaler Inhale 2 puffs every 6 (six) hours as needed for wheezing or shortness of breath (Patient not taking: Reported on 2/29/2024)   • hydrocortisone 2.5 % cream Apply topically 2 (two) times a day prn (Patient not taking: Reported on 8/29/2024)   • [DISCONTINUED] Albuterol-Budesonide (Airsupra) 90-80 MCG/ACT AERO Inhale 1 puff every 6 (six) hours   • [DISCONTINUED] fluticasone (FLONASE) 50 mcg/act nasal spray 1 spray into each nostril daily (Patient taking differently: 1 spray into each nostril daily as needed)     Allergies   Allergen Reactions   • Doxycycline    • Amoxicillin Diarrhea   • Prednisone Palpitations     Medrol Dose pack ok    • Sulfamethoxazole-Trimethoprim      Septra     Immunization History    Administered Date(s) Administered   • COVID-19 MODERNA VACC 0.5 ML IM 01/11/2021, 02/08/2021, 10/22/2021, 04/01/2022   • COVID-19 Moderna Vac BIVALENT 12 Yr+ IM 0.5 ML 09/06/2022   • COVID-19 Pfizer mRNA vacc PF tyrel-sucrose 12 yr and older (Comirnaty) 09/25/2023   • COVID-19, unspecified 10/22/2021, 04/01/2022   • INFLUENZA 09/22/2015, 09/19/2016, 09/15/2018, 09/26/2019, 10/14/2020, 09/24/2021, 10/12/2022, 09/22/2023   • Influenza Split High Dose Preservative Free IM 10/10/2014, 09/22/2015, 09/19/2016, 08/31/2017   • Influenza, high dose seasonal 0.7 mL 09/26/2019, 10/14/2020   • Influenza, seasonal, injectable 09/14/2012   • Pneumococcal Conjugate 13-Valent 07/09/2015   • Pneumococcal Polysaccharide PPV23 11/01/2006, 01/29/2020   • Respiratory Syncytial Virus Vaccine (Recombinant) 01/03/2024   • Tdap 09/01/2010, 08/12/2022   • Zoster 07/18/2008   • Zoster Vaccine Recombinant 04/15/2019, 06/17/2019     Objective     /70 (BP Location: Left arm, Patient Position: Sitting, Cuff Size: Adult)   Pulse 76   Temp 97.5 °F (36.4 °C)   Ht 6' (1.829 m)   Wt 105 kg (230 lb 6.4 oz)   SpO2 96%   BMI 31.25 kg/m²     Physical Exam  Constitutional:       Appearance: Normal appearance.   Eyes:      Pupils: Pupils are equal, round, and reactive to light.   Neck:      Vascular: No carotid bruit.   Cardiovascular:      Rate and Rhythm: Normal rate and regular rhythm.      Pulses: Normal pulses.      Heart sounds: Normal heart sounds. No murmur heard.     No gallop.   Pulmonary:      Effort: Pulmonary effort is normal.      Breath sounds: Normal breath sounds.   Neurological:      Mental Status: He is alert.   Psychiatric:         Mood and Affect: Mood normal.         Behavior: Behavior normal.         Answers submitted by the patient for this visit:  Medicare Annual Wellness Visit (Submitted on 8/22/2024)  How would you rate your overall health?: very good  Compared to last year, how is your physical health?: slightly  "worse  In general, how satisfied are you with your life?: very satisfied  Compared to last year, how is your eyesight?: same  Compared to last year, how is your hearing?: same  Compared to last year, how is your emotional/mental health?: same  How often is anger a problem for you?: never, rarely  How often do you feel unusually tired/fatigued?: never, rarely  In the past 7 days, how much pain have you experienced?: some  If you answered \"some\" or \"a lot\", please rate the severity of your pain on a scale of 1 to 10 (1 being the least severe pain and 10 being the most intense pain).: 2/10  In the past 6 months, have you lost or gained 10 pounds without trying?: No  One or more falls in the last year: No  Do you have trouble with the stairs inside or outside your home?: No  Does your home have working smoke alarms?: Yes  Does your home have a carbon monoxide monitor?: No  Which safety hazards (if any) have you experienced in your home? Please select all that apply.: none  How would you describe your current diet? Please select all that apply.: Regular  In addition to prescription medications, are you taking any over-the-counter supplements?: No  Can you manage your medications?: Yes  Are you currently taking any opioid medications?: No  Can you walk and transfer into and out of your bed and chair?: Yes  Can you dress and groom yourself?: Yes  Can you bathe or shower yourself?: Yes  Can you feed yourself?: Yes  Can you do your laundry/ housekeeping?: Yes  Can you manage your money, pay your bills, and track your expenses?: Yes  Can you make your own meals?: Yes  Can you do your own shopping?: Yes  Within the last 12 months, have you had any hospitalizations or Emergency Department visits?: No  Do you have a living will?: Yes  Do you have a Durable POA (Power of ) for healthcare decisions?: Yes  Do you have an Advanced Directive for end of life decisions?: Yes  How often have you used an illegal drug (including " marijuana) or a prescription medication for non-medical reasons in the past year?: never  What is the typical number of drinks you consume in a day?: 0  What is the typical number of drinks you consume in a week?: 0  How often did you have a drink containing alcohol in the past year?: monthly or less  How many drinks did you have on a typical day  when you were drinking in the past year?: 1 to 2  How often did you have 6 or more drinks on one occasion in the past year?: never

## 2024-09-12 ENCOUNTER — TELEPHONE (OUTPATIENT)
Age: 79
End: 2024-09-12

## 2024-09-12 DIAGNOSIS — M54.2 NECK PAIN: Primary | ICD-10-CM

## 2024-09-12 NOTE — TELEPHONE ENCOUNTER
Patient called stating he has some neck soreness and pain for a couple of weeks now.  The patient describes a sensation of bones snapping in his neck when he turns his head.  The pain does not radiate anywhere and he complains of no headache. Patient was scheduled for first available appointment of 9/18/24 with Dr. Denton, but is asking if he can get an ortho ambulatory referral in lieu of the appointment to see a specialist. Please advise and return patients call.

## 2024-09-12 NOTE — TELEPHONE ENCOUNTER
Please call patient.  Yes, I will certainly place referral order for orthopedics for him, but I would also like him to get x-rays of his neck (orders for both have been placed).

## 2024-09-13 ENCOUNTER — APPOINTMENT (OUTPATIENT)
Dept: RADIOLOGY | Facility: CLINIC | Age: 79
End: 2024-09-13
Payer: MEDICARE

## 2024-09-13 DIAGNOSIS — M54.2 NECK PAIN: ICD-10-CM

## 2024-09-13 PROCEDURE — 72050 X-RAY EXAM NECK SPINE 4/5VWS: CPT

## 2024-09-20 ENCOUNTER — OFFICE VISIT (OUTPATIENT)
Dept: OBGYN CLINIC | Facility: MEDICAL CENTER | Age: 79
End: 2024-09-20
Payer: MEDICARE

## 2024-09-20 VITALS
DIASTOLIC BLOOD PRESSURE: 70 MMHG | HEIGHT: 72 IN | BODY MASS INDEX: 31.15 KG/M2 | HEART RATE: 72 BPM | SYSTOLIC BLOOD PRESSURE: 124 MMHG | WEIGHT: 230 LBS

## 2024-09-20 DIAGNOSIS — M48.02 FORAMINAL STENOSIS OF CERVICAL REGION: ICD-10-CM

## 2024-09-20 DIAGNOSIS — M18.11 PRIMARY OSTEOARTHRITIS OF FIRST CARPOMETACARPAL JOINT OF RIGHT HAND: ICD-10-CM

## 2024-09-20 DIAGNOSIS — M79.641 RIGHT HAND PAIN: ICD-10-CM

## 2024-09-20 DIAGNOSIS — M50.30 DEGENERATIVE CERVICAL DISC: ICD-10-CM

## 2024-09-20 DIAGNOSIS — M54.2 NECK PAIN: Primary | ICD-10-CM

## 2024-09-20 PROCEDURE — 99203 OFFICE O/P NEW LOW 30 MIN: CPT | Performed by: EMERGENCY MEDICINE

## 2024-09-20 RX ORDER — METHYLPREDNISOLONE 4 MG
TABLET, DOSE PACK ORAL
Qty: 1 EACH | Refills: 0 | Status: SHIPPED | OUTPATIENT
Start: 2024-09-20

## 2024-09-20 NOTE — PROGRESS NOTES
Assessment/Plan:    Diagnoses and all orders for this visit:    Neck pain  -     Ambulatory Referral to Orthopedic Surgery  -     methylPREDNISolone 4 MG tablet therapy pack; Use as directed on package  -     Ambulatory Referral to Physical Therapy; Future    Degenerative cervical disc  -     methylPREDNISolone 4 MG tablet therapy pack; Use as directed on package  -     Ambulatory Referral to Physical Therapy; Future    Foraminal stenosis of cervical region  -     methylPREDNISolone 4 MG tablet therapy pack; Use as directed on package  -     Ambulatory Referral to Physical Therapy; Future    Right hand pain    Primary osteoarthritis of first carpometacarpal joint of right hand    Reviewed Xrays C spine and Right hand  Discussed treatment for Leonel's chronic neck pain he has benefited from physical therapy in the past and I have provided a referral for him.  We discussed anti-inflammatories he would like to start with a Medrol Dosepak which she has taken in the past for sinusitis and tolerated well.  He may then transition to Advil as needed.  Instructions have been provided.  If no benefit from therapy and medications to consider referral to pain management for whom he is already seeing for his lumbar spine.  Also with worsening right thumb pain over the CMC joint x-rays do show CMC arthritis discussed treatment options such as CSI and recommend follow-up with orthopedic hand specialist    Return in about 6 weeks (around 11/1/2024).      Subjective:   Patient ID: Leonel Epperson is a 79 y.o. male.    NP presents referred by PCP Dr. Saldana for non radiating worsening neck pain and crunching/snapping over the last month.    He did have pain and evaluation years ago, with intermittent pain since with PT        Review of Systems    The following portions of the patient's chart were reviewed and updated as appropriate:   Allergy:    Allergies   Allergen Reactions    Doxycycline     Amoxicillin Diarrhea    Prednisone  Palpitations     Medrol Dose pack ok     Sulfamethoxazole-Trimethoprim      Septra       Medications:    Current Outpatient Medications:     amLODIPine (NORVASC) 5 mg tablet, Take 1 tablet (5 mg total) by mouth daily, Disp: 90 tablet, Rfl: 3    aspirin 81 mg chewable tablet, Chew 81 mg daily, Disp: , Rfl:     cetirizine (ZyrTEC) 5 MG chewable tablet, Chew 5 mg daily, Disp: , Rfl:     cholecalciferol (VITAMIN D3) 1,000 units tablet, Take 1 tablet by mouth daily, Disp: , Rfl:     clobetasol (TEMOVATE) 0.05 % ointment, APPLY TWICE A DAY FOR 2 WEEKS, THEN AS NEEDED FLARES, Disp: , Rfl: 1    methylPREDNISolone 4 MG tablet therapy pack, Use as directed on package, Disp: 1 each, Rfl: 0    Multiple Vitamins-Minerals (CENTRUM SILVER 50+MEN PO), Take 1 tablet by mouth daily  , Disp: , Rfl:     tamsulosin (FLOMAX) 0.4 mg, TAKE 1 CAPSULE BY MOUTH EVERY DAY WITH DINNER, Disp: 90 capsule, Rfl: 3    albuterol (PROVENTIL HFA,VENTOLIN HFA) 90 mcg/act inhaler, Inhale 2 puffs every 6 (six) hours as needed for wheezing or shortness of breath (Patient not taking: Reported on 2/29/2024), Disp: 8 g, Rfl: 0    hydrocortisone 2.5 % cream, Apply topically 2 (two) times a day prn (Patient not taking: Reported on 8/29/2024), Disp: 30 g, Rfl: 2    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)    Radiculopathy, lumbar region    Spinal stenosis of lumbar region without neurogenic claudication    Chronic bilateral low back pain without sciatica    Chronic pain syndrome    Nonintractable headache    Chronic pain of right thumb    Vasovagal syncope    Palpitations    Elevated PSA    Kidney lesion, native, left    Pancreatic lesion    Memory problem    Dilated aortic root (HCC)    Eczema    Hemorrhoids    Right wrist  pain    Chronic cough    ALANIS (dyspnea on exertion)    Pulmonary granuloma (HCC)    Lymphoma (HCC)    Hypertension    Status post lumbar spine surgery for decompression of spinal cord       Objective:  /70   Pulse 72   Ht 6' (1.829 m)   Wt 104 kg (230 lb)   BMI 31.19 kg/m²     Back Exam     Comments:  Painful limited range of motion of the cervical spine            Physical Exam  Vitals reviewed.   Constitutional:       Appearance: He is well-developed.   HENT:      Head: Normocephalic and atraumatic.   Eyes:      Conjunctiva/sclera: Conjunctivae normal.   Pulmonary:      Effort: Pulmonary effort is normal.   Musculoskeletal:      Cervical back: Neck supple.   Skin:     General: Skin is warm and dry.   Neurological:      Mental Status: He is alert and oriented to person, place, and time.   Psychiatric:         Behavior: Behavior normal.           Neurologic Exam     Mental Status   Oriented to person, place, and time.       Procedures    I have personally reviewed the written report of the pertinent studies.   CERVICAL SPINE     INDICATION:   Cervicalgia.      COMPARISON: September 11, 2019     VIEWS:  XR SPINE CERVICAL COMPLETE 4 OR 5 VW NON INJURY   Images: 7     FINDINGS:     No fracture.      Normal alignment without subluxation.     As seen on prior exam there is mild loss of disc height at C5-6 and C6-7 with suggestion of left foraminal compromise on oblique views.     The prevertebral soft tissues are within normal limits.       The lung apices are clear.     IMPRESSION:        No acute osseous injuries with persistent degenerative changes at C5-6 and C6-7 with suggestion of foraminal compromise.            Past Medical History:   Diagnosis Date    Abnormal blood chemistry     last assessed: 5/1/2013    Abnormal blood sugar     last assessed: 12/18/2014    Allergic     Allergic rhinitis     last assessed: 11/8/2013    Arthritis     Basal cell carcinoma      Face    Cancer (HCC)     Diabetes mellitus  (HCC)     last assessed: 4/30/2013    Headache(784.0)     Hypertension     Low back pain     Nodule of finger of right hand     last assessed: 10/19/2015    Obstruction of right eustachian tube     last assessed: 10/11/2016       Past Surgical History:   Procedure Laterality Date    CATARACT EXTRACTION, BILATERAL      COLONOSCOPY  1999    1 polyp    COLONOSCOPY  2007    Hyperplastic polyp    COLONOSCOPY  2009     And 2013 Normal    COLONOSCOPY  12/29/2017    15 mm polyp proximal sigmoid colon-tubular adenoma.  Diverticulosis by Dr. Rich    INGUINAL HERNIA REPAIR      SPINE SURGERY  9/14/2020       Social History     Socioeconomic History    Marital status: /Civil Union     Spouse name: Not on file    Number of children: Not on file    Years of education: Not on file    Highest education level: Not on file   Occupational History    Occupation: RETIRED-   Tobacco Use    Smoking status: Never    Smokeless tobacco: Never   Vaping Use    Vaping status: Never Used   Substance and Sexual Activity    Alcohol use: Yes     Comment: Couple beers a year    Drug use: No    Sexual activity: Not Currently     Partners: Female     Birth control/protection: None   Other Topics Concern    Not on file   Social History Narrative    DOES NOT CONSUME CAFFEINE     Social Determinants of Health     Financial Resource Strain: Low Risk  (8/21/2023)    Overall Financial Resource Strain (CARDIA)     Difficulty of Paying Living Expenses: Not very hard   Food Insecurity: No Food Insecurity (8/29/2024)    Hunger Vital Sign     Worried About Running Out of Food in the Last Year: Never true     Ran Out of Food in the Last Year: Never true   Transportation Needs: No Transportation Needs (8/29/2024)    PRAPARE - Transportation     Lack of Transportation (Medical): No     Lack of Transportation (Non-Medical): No   Physical Activity: Not on file   Stress: Not on file   Social Connections: Not on file   Intimate Partner Violence: Not on file    Housing Stability: Low Risk  (8/29/2024)    Housing Stability Vital Sign     Unable to Pay for Housing in the Last Year: No     Number of Times Moved in the Last Year: 0     Homeless in the Last Year: No       Family History   Problem Relation Age of Onset    Heart failure Mother     Arthritis Mother     Vision loss Mother     Dementia Father     Arthritis Father     Colon cancer Maternal Grandmother         or stomach (unsure)    Cancer Maternal Grandmother     Colon cancer Son 42        2x and liver    Heart disease Sister         Heart valve replaced    Heart disease Brother         Heart valve problem

## 2024-09-20 NOTE — PATIENT INSTRUCTIONS
While taking the oral steroid Medrol Dosepak, do not take any NSAIDs such as Advil, Motrin, ibuprofen, Motrin, Aleve, naproxen, Celebrex or Meloxicam.  You can restart the NSAIDs after you finish the steroids.    However you may take Tylenol 500mg every 4-6 hours as needed OR max 1,000mg per dose up to 3 times per day for a total of 3,000mg per day  While taking oral steroids, you may experience mild side effects such as feeling jittery or flushing.  Please call if your side effects are significant or you have any questions.     Day 1: 8 mg by mouth before breakfast, 4 mg after lunch and after dinner, and 8 mg at bedtime  Day 2: 4 mg by mouth before breakfast, after lunch, and after dinner and 8 mg at bedtime  Day 3: 4 mg by mouth before breakfast, after lunch, after dinner, and at bedtime  Day 4: 4 mg by mouth before breakfast, after lunch, and at bedtime  Day 5: 4 mg by mouth before breakfast and at bedtime  Day 6: 4 mg by mouth before breakfast      THEN    You may use Advil (ibuprofen) 400-600mg every 6 hours or at least twice per day (OR Aleve (naproxen) 250-500mg every 12 hours as needed for pain and inflammation).  However do not mix or take other NSAIDs together such as Advil, Motrin, ibuprofen, Celebrex, naproxen, diclofenac or Aleve.    You may also take Tylenol (acetaminophen) together with Advil (ibuprofen) or Aleve (naproxen) as this is safe and can help decrease your pain levels.  The dosing for Tylenol is 500mg every 4-6 hours as needed OR max 1,000mg per dose up to 3 times per day for a total of 3,000mg per day  *Check with your primary care physician to see if these medications are safe to take and to make sure they do not interfere with your other medications and medical issues.

## 2024-10-29 ENCOUNTER — OFFICE VISIT (OUTPATIENT)
Dept: CARDIOLOGY CLINIC | Facility: CLINIC | Age: 79
End: 2024-10-29
Payer: MEDICARE

## 2024-10-29 VITALS
SYSTOLIC BLOOD PRESSURE: 126 MMHG | HEART RATE: 82 BPM | DIASTOLIC BLOOD PRESSURE: 70 MMHG | HEIGHT: 72 IN | BODY MASS INDEX: 31.15 KG/M2 | WEIGHT: 230 LBS

## 2024-10-29 DIAGNOSIS — I77.810 DILATED AORTIC ROOT (HCC): ICD-10-CM

## 2024-10-29 DIAGNOSIS — R00.2 PALPITATIONS: Primary | ICD-10-CM

## 2024-10-29 DIAGNOSIS — I10 BENIGN ESSENTIAL HYPERTENSION: ICD-10-CM

## 2024-10-29 DIAGNOSIS — R55 VASOVAGAL SYNCOPE: ICD-10-CM

## 2024-10-29 PROCEDURE — G2211 COMPLEX E/M VISIT ADD ON: HCPCS | Performed by: STUDENT IN AN ORGANIZED HEALTH CARE EDUCATION/TRAINING PROGRAM

## 2024-10-29 PROCEDURE — 99214 OFFICE O/P EST MOD 30 MIN: CPT | Performed by: STUDENT IN AN ORGANIZED HEALTH CARE EDUCATION/TRAINING PROGRAM

## 2024-10-29 NOTE — PROGRESS NOTES
Cardiology Consultation     eLonel Epperson  [id number]  1945  [facility] CARDIOLOGY [place]  [address] [place] [id number]      1. Palpitations        2. Dilated aortic root (HCC)  Ambulatory Referral to Cardiology      3. Benign essential hypertension        4. Vasovagal syncope            Discussion/Summary:  Precordial chest pain  - Had a ED visit on 8/28/2024 for chest discomfort and left arm pain and numbness  - Does not appear to be cardiac in nature, more likely from cervical spine stenosis  - If pain worsens or changes, could consider stress test in the future, but will hold off for now  Palpitations  - History of frequent PVCs  - 48-hour Holter monitor 12/16/2021 showed 3.9% VE burden  - Still has occasional palpitations, but remains relatively rare and well-controlled  History of vasovagal syncope  -Had a vasovagal syncope episode after undergoing Mohs surgery  - No recurrent episodes since then  Hypertension  - Continue with amlodipine 5 mg daily  - Blood pressure is well-controlled today  - Also on aspirin 81 mg daily  Large B-cell lymphoma  -Underwent radiation therapy of his left thigh in November 2019  - Reports being in remission currently  Ascending aortic aneurysm  - TTE 11/15/2023 showed EF 55%, grade 1 DD, mild LA, mild MR, aortic root 3.9 cm, sinus of Valsalva 4.4 cm, ascending aorta 3.9 cm  - CTA chest 11/14/2022 showed probable mild ectasia of the sinus of Valsalva measuring 4.2 cm  - Has repeat echo scheduled for November 2024  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Pancreatic lesion  Spinal stenosis     Follow-up in 6 months.    History of Present Illness:  Leonel Epperson is a 79 y.o. year old male with a past medical history of hypertension, dilated aortic root, Angioendotheliomatosis, spinal stenosis, B cell lymphoma, and BPH.  Patient was in the emergency department at [facility] on 8/28/2024 with chest pain.  CTA was negative for PE or other acute findings.  His other blood work was  otherwise unremarkable. He reports having pain in his neck when turning his head which appears to be from cervical spinal stenosis.  Prior to the ED visit, he reports having some discomfort in his chest followed by a electric shock down his left arm followed by left arm numbness.  ED was unremarkable for any cardiac etiology.  More likely from his cervical neck disease.  Denies any exertional chest pain.  Has had recurrent episodes of the arm electrical shocks, but no recurrent numbness.    Patient Active Problem List   Diagnosis    Benign essential hypertension    Abnormal blood sugar    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(HCC)    Arthritis    Benign prostatic hyperplasia with urinary hesitancy    Cervical strain    Primary cutaneous anaplastic large cell B-cell lymphoma    Radiculopathy, lumbar region    Spinal stenosis of lumbar region without neurogenic claudication    Chronic bilateral low back pain without sciatica    Chronic pain syndrome    Nonintractable headache    Chronic pain of right thumb    Vasovagal syncope    Palpitations    Elevated PSA    Kidney lesion, native, left    Pancreatic lesion    Memory problem    Dilated aortic root (HCC)    Eczema    Hemorrhoids    Right wrist pain    Chronic cough    ALANIS (dyspnea on exertion)    Pulmonary granuloma (HCC)    Lymphoma (HCC)    Hypertension    Status post lumbar spine surgery for decompression of spinal cord     Past Medical History:   Diagnosis Date    Abnormal blood chemistry     last assessed: 5/1/2013    Abnormal blood sugar     last assessed: 12/18/2014    Allergic     Allergic rhinitis     last assessed: 11/8/2013    Arthritis     Basal cell carcinoma      Face    Cancer (HCC)     Diabetes mellitus (HCC)     last assessed: 4/30/2013    Headache(784.0)     Hypertension     Low back pain     Nodule of finger of right hand     last assessed: 10/19/2015    Obstruction of right eustachian tube     last assessed: 10/11/2016     Social History     Socioeconomic  History    Marital status: /Civil Union     Spouse name: Not on file    Number of children: Not on file    Years of education: Not on file    Highest education level: Not on file   Occupational History    Occupation: RETIRED-   Tobacco Use    Smoking status: Never    Smokeless tobacco: Never   Vaping Use    Vaping status: Never Used   Substance and Sexual Activity    Alcohol use: Yes     Comment: Couple beers a year    Drug use: No    Sexual activity: Not Currently     Partners: Female     Birth control/protection: None   Other Topics Concern    Not on file   Social History Narrative    DOES NOT CONSUME CAFFEINE     Social Determinants of Health     Financial Resource Strain: Low Risk  (8/21/2023)    Overall Financial Resource Strain (CARDIA)     Difficulty of Paying Living Expenses: Not very hard   Food Insecurity: No Food Insecurity (8/29/2024)    Nursing - Inadequate Food Risk Classification     Worried About Running Out of Food in the Last Year: Never true     Ran Out of Food in the Last Year: Never true     Ran Out of Food in the Last Year: Not on file   Transportation Needs: No Transportation Needs (8/29/2024)    PRAPARE - Transportation     Lack of Transportation (Medical): No     Lack of Transportation (Non-Medical): No   Physical Activity: Not on file   Stress: Not on file   Social Connections: Not on file   Intimate Partner Violence: Not on file   Housing Stability: Low Risk  (8/29/2024)    Housing Stability Vital Sign     Unable to Pay for Housing in the Last Year: No     Number of Times Moved in the Last Year: 0     Homeless in the Last Year: No      Family History   Problem Relation Age of Onset    Heart failure Mother     Arthritis Mother     Vision loss Mother     Dementia Father     Arthritis Father     Colon cancer Maternal Grandmother         or stomach (unsure)    Cancer Maternal Grandmother     Colon cancer Son 42        2x and liver    Heart disease Sister         Heart valve replaced     Heart disease Brother         Heart valve problem     Past Surgical History:   Procedure Laterality Date    CATARACT EXTRACTION, BILATERAL      COLONOSCOPY  1999    1 polyp    COLONOSCOPY  2007    Hyperplastic polyp    COLONOSCOPY  2009     And 2013 Normal    COLONOSCOPY  12/29/2017    15 mm polyp proximal sigmoid colon-tubular adenoma.  Diverticulosis by Dr. Rich    INGUINAL HERNIA REPAIR      SPINE SURGERY  9/14/2020       Current Outpatient Medications:     albuterol (PROVENTIL HFA,VENTOLIN HFA) 90 mcg/act inhaler, Inhale 2 puffs every 6 (six) hours as needed for wheezing or shortness of breath, Disp: 8 g, Rfl: 0    amLODIPine (NORVASC) 5 mg tablet, Take 1 tablet (5 mg total) by mouth daily, Disp: 90 tablet, Rfl: 3    aspirin 81 mg chewable tablet, Chew 81 mg daily, Disp: , Rfl:     cetirizine (ZyrTEC) 5 MG chewable tablet, Chew 5 mg daily, Disp: , Rfl:     cholecalciferol (VITAMIN D3) 1,000 units tablet, Take 1 tablet by mouth daily, Disp: , Rfl:     clobetasol (TEMOVATE) 0.05 % ointment, APPLY TWICE A DAY FOR 2 WEEKS, THEN AS NEEDED FLARES, Disp: , Rfl: 1    hydrocortisone 2.5 % cream, Apply topically 2 (two) times a day prn, Disp: 30 g, Rfl: 2    Multiple Vitamins-Minerals (CENTRUM SILVER 50+MEN PO), Take 1 tablet by mouth daily  , Disp: , Rfl:     tamsulosin (FLOMAX) 0.4 mg, TAKE 1 CAPSULE BY MOUTH EVERY DAY WITH DINNER, Disp: 90 capsule, Rfl: 3    methylPREDNISolone 4 MG tablet therapy pack, Use as directed on package, Disp: 1 each, Rfl: 0  Allergies   Allergen Reactions    Doxycycline     Amoxicillin Diarrhea    Prednisone Palpitations     Medrol Dose pack ok     Sulfamethoxazole-Trimethoprim      Septra         Labs:  Lab Results   Component Value Date    ALT 17 08/09/2024    AST 21 08/09/2024    BUN 12 08/09/2024    CALCIUM 9.1 08/09/2024     08/09/2024    CHOL 157 07/06/2015    CO2 30 08/09/2024    CREATININE 0.94 08/09/2024    HDL 51 08/09/2024    HCT 46.7 08/09/2024    HGB 15.2 08/09/2024     HGBA1C 5.8 (H) 2024     2024    K 4.1 2024    PSA 7.166 (H) 2024     2015    TRIG 81 2024    WBC 5.07 2024       Imaging: No results found.    EC2023: Sinus rhythm with PACs    Review of Systems:  Review of Systems   Constitutional:  Negative for chills, diaphoresis, fatigue and fever.   HENT:  Negative for congestion.    Eyes:  Negative for photophobia and visual disturbance.   Respiratory:  Negative for chest tightness and shortness of breath.    Cardiovascular:  Positive for chest pain. Negative for palpitations and leg swelling.   Gastrointestinal:  Negative for abdominal distention, abdominal pain, diarrhea, nausea and vomiting.   Genitourinary:  Negative for difficulty urinating and dysuria.   Musculoskeletal:  Positive for arthralgias and myalgias. Negative for gait problem and joint swelling.   Skin:  Negative for color change, pallor and rash.   Neurological:  Negative for dizziness, syncope, numbness and headaches.   Psychiatric/Behavioral:  Negative for agitation, behavioral problems and confusion.          Vitals:    10/29/24 1545   BP: 126/70   Pulse: 82      Vitals:    10/29/24 1545   Weight: 104 kg (230 lb)     Height: 6' (182.9 cm)     Physical Exam:  General appearance:  Appears stated age, alert, well appearing and in no distress  HEENT:  PERRLA, EOMI, no scleral icterus, no conjunctival pallor  NECK:  Supple, No elevated JVP, no thyromegaly, no carotid bruits  HEART:  Regular rate and rhythm, normal S1/S2, no significant audible murmur  LUNGS:  Clear to auscultation bilaterally  ABDOMEN:  Soft, non-tender, positive bowel sounds, no rebound or guarding, no organomegaly   EXTREMITIES:  No edema  VASCULAR:  Normal pedal pulses   SKIN: No lesions or rashes on exposed skin  NEURO:  CN II-XII intact, no focal deficits

## 2024-11-11 ENCOUNTER — OFFICE VISIT (OUTPATIENT)
Dept: URGENT CARE | Facility: CLINIC | Age: 79
End: 2024-11-11
Payer: MEDICARE

## 2024-11-11 VITALS
RESPIRATION RATE: 18 BRPM | HEART RATE: 78 BPM | TEMPERATURE: 98.3 F | SYSTOLIC BLOOD PRESSURE: 128 MMHG | DIASTOLIC BLOOD PRESSURE: 82 MMHG | OXYGEN SATURATION: 98 %

## 2024-11-11 DIAGNOSIS — T15.91XA FOREIGN BODY, EYE, RIGHT, INITIAL ENCOUNTER: ICD-10-CM

## 2024-11-11 DIAGNOSIS — H10.12 ACUTE ATOPIC CONJUNCTIVITIS OF LEFT EYE: Primary | ICD-10-CM

## 2024-11-11 PROCEDURE — G0463 HOSPITAL OUTPT CLINIC VISIT: HCPCS | Performed by: NURSE PRACTITIONER

## 2024-11-11 PROCEDURE — 99213 OFFICE O/P EST LOW 20 MIN: CPT | Performed by: NURSE PRACTITIONER

## 2024-11-11 RX ORDER — TOBRAMYCIN 3 MG/ML
1 SOLUTION/ DROPS OPHTHALMIC
Qty: 5 ML | Refills: 0 | Status: SHIPPED | OUTPATIENT
Start: 2024-11-11

## 2024-11-11 RX ORDER — TETRACAINE HYDROCHLORIDE 5 MG/ML
1 SOLUTION OPHTHALMIC ONCE
Status: COMPLETED | OUTPATIENT
Start: 2024-11-11 | End: 2024-11-11

## 2024-11-11 RX ADMIN — TETRACAINE HYDROCHLORIDE 1 DROP: 5 SOLUTION OPHTHALMIC at 10:30

## 2024-11-11 NOTE — PROGRESS NOTES
[facility]        NAME: Leonel Epperson is a 79 y.o. male  : 1945    MRN: [id number]  DATE: 2024  TIME: 11:26 AM    Assessment and Plan   Acute atopic conjunctivitis of left eye [H10.12]  1. Acute atopic conjunctivitis of left eye  tobramycin (TOBREX) 0.3 % SOLN        See procedure note -   No evidence of retained foreign body   Will start course of eye drops   F/u with pcp   Pt in agreement with plan of care    Patient Instructions     Follow up with PCP in 3-5 days.  Proceed to  ER if symptoms worsen.    Chief Complaint     Chief Complaint   Patient presents with    Eye Pain     L sided eye pain. Friday the pt was cutting the grass and d/t the dry conditions, a lot of dust was blowing in the air. Some of the dust got into the pts L eye. L eye tearing.          History of Present Illness   Leonel Epperson presents to the clinic c/o    Eye Pain (L sided eye pain. Friday the pt was cutting the grass and d/t the dry conditions, a lot of dust was blowing in the air. Some of the dust got into the pts L eye. L eye tearing. )  Has not tried to rinse eye, states has been feeling better as time has gone along, however, still notices discomfort so wants to get checked while his wife gets her bloodwork done.     Eye Pain         Review of Systems   Review of Systems   Eyes:  Positive for pain.         Current Medications     Long-Term Medications   Medication Sig Dispense Refill    amLODIPine (NORVASC) 5 mg tablet Take 1 tablet (5 mg total) by mouth daily 90 tablet 3    cetirizine (ZyrTEC) 5 MG chewable tablet Chew 5 mg daily      cholecalciferol (VITAMIN D3) 1,000 units tablet Take 1 tablet by mouth daily      clobetasol (TEMOVATE) 0.05 % ointment APPLY TWICE A DAY FOR 2 WEEKS, THEN AS NEEDED FLARES  1    hydrocortisone 2.5 % cream Apply topically 2 (two) times a day prn 30 g 2    tamsulosin (FLOMAX) 0.4 mg TAKE 1 CAPSULE BY MOUTH EVERY DAY WITH DINNER 90 capsule 3       Current Allergies  "    Allergies as of 11/11/2024 - Reviewed 11/11/2024   Allergen Reaction Noted    Doxycycline  08/27/2020    Amoxicillin Diarrhea 10/15/2012    Prednisone Palpitations 11/06/2019    Sulfamethoxazole-trimethoprim  08/27/2020            The following portions of the patient's history were reviewed and updated as appropriate: allergies, current medications, past family history, past medical history, past social history, past surgical history and problem list.    Objective   /82   Pulse 78   Temp 98.3 °F (36.8 °C)   Resp 18   SpO2 98%        Physical Exam     Physical Exam  Vitals and nursing note reviewed.   Constitutional:       Appearance: Normal appearance. He is well-developed.   HENT:      Head: Normocephalic and atraumatic.   Eyes:      General: Lids are normal. Vision grossly intact.      Extraocular Movements: Extraocular movements intact.      Conjunctiva/sclera: Conjunctivae normal.      Pupils: Pupils are equal, round, and reactive to light.   Cardiovascular:      Rate and Rhythm: Normal rate and regular rhythm.      Heart sounds: Normal heart sounds, S1 normal and S2 normal.   Pulmonary:      Effort: Pulmonary effort is normal.      Breath sounds: Normal breath sounds.   Skin:     General: Skin is warm and dry.   Neurological:      Mental Status: He is alert.   Psychiatric:         Speech: Speech normal.         Behavior: Behavior normal.         Thought Content: Thought content normal.         Judgment: Judgment normal.         Universal Protocol:  procedure performed by consultantConsent: Verbal consent obtained.  Risks and benefits: risks, benefits and alternatives were discussed  Consent given by: patient  Time out: Immediately prior to procedure a \"time out\" was called to verify the correct patient, procedure, equipment, support staff and site/side marked as required.  Timeout called at: 11/11/2024 10:45 AM.  Patient understanding: patient states understanding of the procedure being " performed  Patient consent: the patient's understanding of the procedure matches consent given  Procedure consent: procedure consent matches procedure scheduled  Relevant documents: relevant documents present and verified  Test results: test results available and properly labeled  Site marked: the operative site was marked  Radiology Images displayed and confirmed. If images not available, report reviewed: imaging studies available  Required items: required blood products, implants, devices, and special equipment available  Patient identity confirmed: verbally with patient  Foreign body removal    Date/Time: 11/11/2024 10:00 AM    Performed by: LAURA Ferraro  Authorized by: LAURA Ferraro  Body area: eye  Location details: left conjunctiva    Anesthesia:  Local Anesthetic: tetracaine drops  Anesthetic total (drops): 1  Sedation:  Patient sedated: no  Patient restrained: no  Removal mechanism: irrigation and moist cotton swab  Eye examined with fluorescein.  No fluorescein uptake.  Complexity: simple  Post-procedure assessment: foreign body not removed  Patient tolerance: patient tolerated the procedure well with no immediate complications  Comments: No foreign body identified

## 2024-11-15 ENCOUNTER — HOSPITAL ENCOUNTER (OUTPATIENT)
Dept: NON INVASIVE DIAGNOSTICS | Facility: HOSPITAL | Age: 79
Discharge: HOME | End: 2024-11-15
Payer: MEDICARE

## 2024-11-15 VITALS
WEIGHT: 229.28 LBS | HEART RATE: 78 BPM | HEIGHT: 72 IN | BODY MASS INDEX: 31.05 KG/M2 | SYSTOLIC BLOOD PRESSURE: 128 MMHG | DIASTOLIC BLOOD PRESSURE: 82 MMHG

## 2024-11-15 DIAGNOSIS — I77.810 DILATED AORTIC ROOT (HCC): ICD-10-CM

## 2024-11-15 DIAGNOSIS — I10 BENIGN ESSENTIAL HYPERTENSION: ICD-10-CM

## 2024-11-15 LAB
AORTIC ROOT: 3.9 CM
AORTIC VALVE MEAN VELOCITY: 6.9 M/S
APICAL FOUR CHAMBER EJECTION FRACTION: 59 %
ASCENDING AORTA: 3.7 CM
AV AREA BY CONTINUOUS VTI: 3 CM2
AV AREA PEAK VELOCITY: 3.1 CM2
AV LVOT MEAN GRADIENT: 2 MMHG
AV LVOT PEAK GRADIENT: 3 MMHG
AV MEAN GRADIENT: 2 MMHG
AV PEAK GRADIENT: 4 MMHG
AV VALVE AREA: 3.02 CM2
AV VELOCITY RATIO: 0.82
BSA FOR ECHO PROCEDURE: 2.26 M2
DOP CALC AO PEAK VEL: 1.05 M/S
DOP CALC AO VTI: 23.36 CM
DOP CALC LVOT AREA: 3.8 CM2
DOP CALC LVOT CARDIAC INDEX: 1.99 L/MIN/M2
DOP CALC LVOT CARDIAC OUTPUT: 4.51 L/MIN
DOP CALC LVOT DIAMETER: 2.2 CM
DOP CALC LVOT PEAK VEL VTI: 18.54 CM
DOP CALC LVOT PEAK VEL: 0.86 M/S
DOP CALC LVOT STROKE INDEX: 31 ML/M2
DOP CALC LVOT STROKE VOLUME: 70.44
E WAVE DECELERATION TIME: 232 MS
E/A RATIO: 0.85
FRACTIONAL SHORTENING: 33 (ref 28–44)
INTERVENTRICULAR SEPTUM IN DIASTOLE (PARASTERNAL SHORT AXIS VIEW): 1.1 CM
INTERVENTRICULAR SEPTUM: 1.1 CM (ref 0.6–1.1)
LAAS-AP2: 26 CM2
LAAS-AP4: 20.6 CM2
LEFT ATRIUM SIZE: 3.7 CM
LEFT ATRIUM VOLUME (MOD BIPLANE): 77 ML
LEFT ATRIUM VOLUME INDEX (MOD BIPLANE): 34.1 ML/M2
LEFT INTERNAL DIMENSION IN SYSTOLE: 2.9 CM (ref 2.1–4)
LEFT VENTRICULAR INTERNAL DIMENSION IN DIASTOLE: 4.3 CM (ref 3.5–6)
LEFT VENTRICULAR POSTERIOR WALL IN END DIASTOLE: 1.1 CM
LEFT VENTRICULAR STROKE VOLUME: 49 ML
LVSV (TEICH): 49 ML
MV E'TISSUE VEL-LAT: 6 CM/S
MV E'TISSUE VEL-SEP: 7 CM/S
MV PEAK A VEL: 0.74 M/S
MV PEAK E VEL: 63 CM/S
MV STENOSIS PRESSURE HALF TIME: 67 MS
MV VALVE AREA P 1/2 METHOD: 3.28
RIGHT ATRIUM AREA SYSTOLE A4C: 17.6 CM2
RIGHT VENTRICLE ID DIMENSION: 3.8 CM
SINOTUBULAR JUNCTION: 3.5 CM
SINUS: 4.2 CM
SL CV LEFT ATRIUM LENGTH A2C: 5.6 CM
SL CV PED ECHO LEFT VENTRICLE DIASTOLIC VOLUME (MOD BIPLANE) 2D: 83 ML
SL CV PED ECHO LEFT VENTRICLE SYSTOLIC VOLUME (MOD BIPLANE) 2D: 34 ML
SL CV SINUS OF VALSALVA 2D: 4.4 CM
STJ: 3.5 CM
TR MAX PG: 21 MMHG
TR PEAK VELOCITY: 2.3 M/S
TRICUSPID ANNULAR PLANE SYSTOLIC EXCURSION: 2.2 CM
TRICUSPID VALVE PEAK REGURGITATION VELOCITY: 2.28 M/S

## 2024-11-15 PROCEDURE — 93306 TTE W/DOPPLER COMPLETE: CPT

## 2024-11-15 PROCEDURE — 93306 TTE W/DOPPLER COMPLETE: CPT | Performed by: INTERNAL MEDICINE

## 2025-01-22 ENCOUNTER — EVALUATION (OUTPATIENT)
Dept: PHYSICAL THERAPY | Facility: CLINIC | Age: 80
End: 2025-01-22
Payer: MEDICARE

## 2025-01-22 DIAGNOSIS — M50.30 DEGENERATIVE CERVICAL DISC: ICD-10-CM

## 2025-01-22 DIAGNOSIS — M54.2 NECK PAIN: Primary | ICD-10-CM

## 2025-01-22 DIAGNOSIS — M48.02 FORAMINAL STENOSIS OF CERVICAL REGION: ICD-10-CM

## 2025-01-22 PROCEDURE — 97161 PT EVAL LOW COMPLEX 20 MIN: CPT

## 2025-01-22 PROCEDURE — 97140 MANUAL THERAPY 1/> REGIONS: CPT

## 2025-01-22 NOTE — PROGRESS NOTES
PT Evaluation     Today's date: 2025  Patient name: Leonel Epperson  : 1945  MRN: [id number]  Referring provider: Lazaro House MD  Dx:   Encounter Diagnosis     ICD-10-CM    1. Neck pain  M54.2 Ambulatory Referral to Physical Therapy      2. Degenerative cervical disc  M50.30 Ambulatory Referral to Physical Therapy      3. Foraminal stenosis of cervical region  M48.02 Ambulatory Referral to Physical Therapy                   Assessment  Impairments: abnormal or restricted ROM, activity intolerance, impaired physical strength, lacks appropriate home exercise program, pain with function, scapular dyskinesis, poor posture , poor body mechanics, participation limitations, activity limitations and endurance  Symptom irritability: low    Assessment details: Pt is a 80 y.o. year old male that presents to outpatient physical therapy with chronic neck pain. Pt demonstrates signs and symptoms that point to cervical neck pain with impaired movement coordination due to muscle imbalance. Pt demonstrates increased tone of upper traps, levator scaps, and suboccipitals, with decreased strength of deep neck flexors and scapular retractors. Hypomobility noted with joint mobility testing to the C3-C7 levels. No signs or symptoms of radicular symptoms at this time. Pt demonstrates increased pain, decreased ROM, decreased strength, decreased activity tolerance, and decreased functional activity such as looking up and turning head to the L due to pain. Pt appears motivated; HEP was reviewed and given to patient. Pt would benefit from skilled physical therapy to address noted impairments, meet patient's goals, and to return to PLOF.   Thank you for this referral.    Understanding of Dx/Px/POC: good     Prognosis: fair    Goals  STGs:   1. Pt will be able to demonstrate an increase of strength by at least 1/2 grade within 4 weeks   2. Pt will be able to achieve increased ROM by at least 25% within 4 weeks.   3. Pt will be able to  report pain less than 5/10 at worse within 4 weeks.   4. Pt will be able to demonstrate improve cervical and thoracic PA joint mobility to WNL without symptoms within 4 weeks.     LTGs:   1. Pt will be independent with all IADLs without pain or discomfort upon discharge.   2. Pt will be independent with HEP upon discharge   3. Pt will be able to report no pain or discomfort with all work duties and recreational activities for a full day upon discharge.  4. Pt will be able to report 'no difficulty' with heavy household activities such as cleaning or lifting at least 10 lbs to waist upon discharge     Plan  Patient would benefit from: PT eval and skilled physical therapy  Planned modality interventions: cryotherapy, electrical stimulation/Russian stimulation, TENS, low level laser therapy, thermotherapy: hydrocollator packs, ultrasound and unattended electrical stimulation    Planned therapy interventions: abdominal trunk stabilization, IADL retraining, joint mobilization, manual therapy, massage, muscle pump exercises, neuromuscular re-education, patient education, strengthening, stretching, therapeutic activities, therapeutic exercise, therapeutic training, home exercise program, gait training, functional ROM exercises, flexibility, balance, body mechanics training, breathing training, IASTM, nerve gliding, balance/weight bearing training and patient/caregiver education    Frequency: 2x week  Duration in weeks: 8  Treatment plan discussed with: patient and PTA        Subjective Evaluation    History of Present Illness  Mechanism of injury: Pt states that he has been having neck pain/discomfort for the past 5-6 years. Reports that he did have some physical therapy a few years ago with good relief, but his pain has worsened over the last few weeks/months without mechanism. Reports that his pain comes and goes and has clicking, popping and snapping regularly, especially with neck rotations.   Reports that his pain does  go down to the top of his L shoulder at times.   Social involvement: takes care of his wife, does most of the house work    Denies changes in bowel/bladder. Denies saddle anesthesia. Denies numbness/tingling    VBI: (-) Diplopia, (-) Dizziness, (-) Dysphagia, (-) Dysarthria, (-) Drop attacks, (-) Nausea, (-) Vomiting, (-) Sensory changes, (-) Nystagmus         AGGS: looking up, looking to the L   EASES: rest, avoiding aggs, Tylenol   Goals: decrease pain a little, relearn exercises/stretches      Imaging findings: Cervical spine Xray on 2024 IMPRESSION:     No acute osseous injuries with persistent degenerative changes at C5-6 and C6-7 with suggestion of foraminal compromise  Patient Goals  Patient goals for therapy: decreased pain and increased motion    Pain  Current pain ratin  At best pain ratin  At worst pain ratin  Quality: discomfort, tight, sharp, pressure and pulling  Progression: worsening    Social Support  Lives in: one-story house  Lives with: spouse    Employment status: not working  Hand dominance: right    Treatments  Previous treatment: physical therapy and medication      Objective     Static Posture     Head  Forward.    Cervical Spine  Rotated right.    Shoulders  Asymmetric shoulders and rounded.    Scapulae  Left anteriorly tipped and left elevated.    Palpation   Left   Hypertonic in the levator scapulae and upper trapezius.   Tenderness of the cervical paraspinals, levator scapulae and upper trapezius.   Trigger point to upper trapezius.     Right   Tenderness of the upper trapezius.     Neurological Testing     Sensation   Cervical/Thoracic   Left   Intact: light touch    Right   Intact: light touch    Reflexes   Left   Biceps (C5/C6): trace (1+)  Brachioradialis (C6): trace (1+)  Vasquez's reflex: negative    Right   Biceps (C5/C6): trace (1+)  Brachioradialis (C6): trace (1+)  Vasquez's reflex: negative    Active Range of Motion   Cervical/Thoracic Spine        Cervical    Flexion:  WFL  Extension: 14 degrees     with pain  Left lateral flexion: 19 degrees     with pain  Right lateral flexion: 19 degrees      Left rotation: 43 degrees with pain  Right rotation: 71 degrees       Joint Play   Joints within functional limits: C2, C3, C4, C5, C6, C7, T1 and 1st rib     Pain: C4, C5 and C6     Strength/Myotome Testing     Left Shoulder     Planes of Motion   Flexion: 4+   Abduction: 4+   External rotation at 0°: 4+     Isolated Muscles   Upper trapezius: 5     Right Shoulder     Planes of Motion   Flexion: 4+   Abduction: 4+   External rotation at 0°: 4+     Left Elbow   Flexion: 4+  Extension: 4+    Right Elbow   Flexion: 4+  Extension: 4+    Tests   Pain with max opening    Cervical   Positive craniocervical flexion test and neck flexor muscle endurance test.    Left   Positive Spurling's Test A.     Right   Negative Spurling's Test A.     Left Shoulder   Negative ULTT1 and ULTT4.     Right Shoulder   Negative ULTT1 and ULTT4.     Additional Tests Details  Unable to perform DNF endurance testing due to reports of pain with position.              Precautions:   Patient Active Problem List   Diagnosis    Benign essential hypertension    Abnormal blood sugar    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(HCC)    Arthritis    Benign prostatic hyperplasia with urinary hesitancy    Cervical strain    Primary cutaneous anaplastic large cell B-cell lymphoma    Radiculopathy, lumbar region    Spinal stenosis of lumbar region without neurogenic claudication    Chronic bilateral low back pain without sciatica    Chronic pain syndrome    Nonintractable headache    Chronic pain of right thumb    Vasovagal syncope    Palpitations    Elevated PSA    Kidney lesion, native, left    Pancreatic lesion    Memory problem    Dilated aortic root (HCC)    Eczema    Hemorrhoids    Right wrist pain    Chronic cough    ALANIS (dyspnea on exertion)    Pulmonary granuloma (HCC)    Lymphoma (HCC)    Hypertension    Status  "post lumbar spine surgery for decompression of spinal cord     Daily Treatment Diary    Date 1/22            FOTO IE -             Re-Eval IE               Manuals    C4-C7 down glide on the L JM - grade 1            UT stretch JM  PNF           Lateral joint mobs with movement JM - grade 1            Manual cervical traction  JM - grade 1                         Neuro Re-Ed     Cervical retraction  2x10             Scapular retraction  Green TB 2x10                                                                              Ther Ex    Arm bike - ROM             UT stretch 30\" hold 3x (L)            SNAG with towel              Standing shoulder extension and row with TB             Seated thoracic extension                                                                                            Ther Activity                              Gait Training                              Modalities    Moist heat 7 minutes pre treat                                  "

## 2025-01-26 DIAGNOSIS — N40.1 BENIGN PROSTATIC HYPERPLASIA WITH URINARY HESITANCY: ICD-10-CM

## 2025-01-26 DIAGNOSIS — R39.11 BENIGN PROSTATIC HYPERPLASIA WITH URINARY HESITANCY: ICD-10-CM

## 2025-01-26 RX ORDER — TAMSULOSIN HYDROCHLORIDE 0.4 MG/1
0.4 CAPSULE ORAL
Qty: 90 CAPSULE | Refills: 1 | Status: SHIPPED | OUTPATIENT
Start: 2025-01-26

## 2025-01-28 ENCOUNTER — OFFICE VISIT (OUTPATIENT)
Dept: PHYSICAL THERAPY | Facility: CLINIC | Age: 80
End: 2025-01-28
Payer: MEDICARE

## 2025-01-28 DIAGNOSIS — M54.2 NECK PAIN: Primary | ICD-10-CM

## 2025-01-28 DIAGNOSIS — M48.02 FORAMINAL STENOSIS OF CERVICAL REGION: ICD-10-CM

## 2025-01-28 DIAGNOSIS — M50.30 DEGENERATIVE CERVICAL DISC: ICD-10-CM

## 2025-01-28 PROCEDURE — 97140 MANUAL THERAPY 1/> REGIONS: CPT

## 2025-01-28 PROCEDURE — 97110 THERAPEUTIC EXERCISES: CPT

## 2025-01-28 NOTE — PROGRESS NOTES
"Daily Note     Today's date: 2025  Patient name: Leonel Epperson  : 1945  MRN: [id number]  Referring provider: Lazaro House MD  Dx:   Encounter Diagnosis     ICD-10-CM    1. Neck pain  M54.2       2. Degenerative cervical disc  M50.30       3. Foraminal stenosis of cervical region  M48.02           Start Time: 1000  Stop Time: 1045  Total time in clinic (min): 45 minutes    Subjective: Patient reports he was \"sore: after performing his exercise that cause some neck and shoulder pain that was \"hurting\", difficulty reaching out for things. It did subside by late yesterday and feels ok today. Pt notes difficulty with turning to the left, he feels a \"snap\" to left side with rotation.      Objective: See treatment diary below      Assessment: Tolerated treatment well. First session since IE with focus on pain management and cervical and scapular strength. Initiated manual therapy to improve myofascial mobility, decrease pain, and optimize muscle activation by reducing inhibition from pain or tissue restrictions. Significant TTP note to L UT today- pt overall responded positively to treatment with noted release of tissue restrictions, assess for nv.    Patient c/o of some pain during self UT stretches with return back to neutral position; however not during manuals. Patient would benefit from continued PT        Plan: Continue per plan of care.      Precautions:   Patient Active Problem List   Diagnosis    Benign essential hypertension    Abnormal blood sugar    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(HCC)    Arthritis    Benign prostatic hyperplasia with urinary hesitancy    Cervical strain    Primary cutaneous anaplastic large cell B-cell lymphoma    Radiculopathy, lumbar region    Spinal stenosis of lumbar region without neurogenic claudication    Chronic bilateral low back pain without sciatica    Chronic pain syndrome    Nonintractable headache    Chronic pain of right thumb    Vasovagal syncope    Palpitations    Elevated " "PSA    Kidney lesion, native, left    Pancreatic lesion    Memory problem    Dilated aortic root (HCC)    Eczema    Hemorrhoids    Right wrist pain    Chronic cough    ALANIS (dyspnea on exertion)    Pulmonary granuloma (HCC)    Lymphoma (HCC)    Hypertension    Status post lumbar spine surgery for decompression of spinal cord     Daily Treatment Diary    Date 1/22 1/28           FOTO IE -             Re-Eval IE               Manuals    C4-C7 down glide on the L JM - grade 1            UT stretch JM  JM   L UT PNF          Lateral joint mobs with movement JM - grade 1            Manual cervical traction  JM - grade 1 JM STM  sub                        Neuro Re-Ed     Cervical retraction  2x10  2x10           Scapular retraction  Green TB 2x10  Green TB 2x10                                                                             Ther Ex    Arm bike - ROM             UT stretch 30\" hold 3x (L) 30\" hold 3x (L)           SNAG with towel              Standing shoulder extension and row with TB             Seated thoracic extension   10\" x10                                                                                         Ther Activity                              Gait Training                              Modalities    Moist heat 7 minutes pre treat 7 minutes pre treat                                 "

## 2025-01-31 ENCOUNTER — OFFICE VISIT (OUTPATIENT)
Dept: PHYSICAL THERAPY | Facility: CLINIC | Age: 80
End: 2025-01-31
Payer: MEDICARE

## 2025-01-31 DIAGNOSIS — M50.30 DEGENERATIVE CERVICAL DISC: ICD-10-CM

## 2025-01-31 DIAGNOSIS — M54.2 NECK PAIN: Primary | ICD-10-CM

## 2025-01-31 DIAGNOSIS — M48.02 FORAMINAL STENOSIS OF CERVICAL REGION: ICD-10-CM

## 2025-01-31 PROCEDURE — 97140 MANUAL THERAPY 1/> REGIONS: CPT

## 2025-01-31 PROCEDURE — 97110 THERAPEUTIC EXERCISES: CPT

## 2025-01-31 NOTE — PROGRESS NOTES
Daily Note     Today's date: 2025  Patient name: Leonel Epperson  : 1945  MRN: [id number]  Referring provider: Lazaro House MD  Dx:   Encounter Diagnosis     ICD-10-CM    1. Neck pain  M54.2       2. Degenerative cervical disc  M50.30       3. Foraminal stenosis of cervical region  M48.02                      Subjective: Patient reported that he continues to have difficulty with sidebending head to R as this results in sharp pain in L UT. He is starting to wonder if symptoms will ever improve.      Objective: See treatment diary below.      Assessment: Tenderness to moderate palpation through L UT. Fair tolerance to mobilizations for pain modulation. Improved tolerance to scap depression with less sidebending for manual UT stretch.  Educated on performing UT stretch with a self assist on return to neutral as he was experiencing increased pain with active return which did help. Post session he noted an overall decrease in pain. Encouraged on adding SNAGs at home and educated on allowing more adequate time to see improvement.      Plan: Continue per plan of care.          Precautions:   Patient Active Problem List   Diagnosis    Benign essential hypertension    Abnormal blood sugar    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(HCC)    Arthritis    Benign prostatic hyperplasia with urinary hesitancy    Cervical strain    Primary cutaneous anaplastic large cell B-cell lymphoma    Radiculopathy, lumbar region    Spinal stenosis of lumbar region without neurogenic claudication    Chronic bilateral low back pain without sciatica    Chronic pain syndrome    Nonintractable headache    Chronic pain of right thumb    Vasovagal syncope    Palpitations    Elevated PSA    Kidney lesion, native, left    Pancreatic lesion    Memory problem    Dilated aortic root (HCC)    Eczema    Hemorrhoids    Right wrist pain    Chronic cough    ALANIS (dyspnea on exertion)    Pulmonary granuloma (HCC)    Lymphoma (HCC)    Hypertension    Status post lumbar  "spine surgery for decompression of spinal cord     Daily Treatment Diary    Date 1/22 1/28 1/31          FOTO IE -             Re-Eval IE               Manuals    C4-C7 down glide on the L JM - grade 1  Grade 1 -  JM          UT stretch JM  JM   L UT Scap depression - EH    L UT STM - EH          Lateral joint mobs with movement JM - grade 1  Grade 1 -  JM          Manual cervical traction  JM - grade 1 JM STM  sub EH                       Neuro Re-Ed     Cervical retraction  2x10  2x10 2x10          Scapular retraction  Green TB 2x10  Green TB 2x10                                                                             Ther Ex    Arm bike - ROM             UT stretch 30\" hold 3x (L) 30\" hold 3x (L) L    10\"x  10 - assist to return          SNAG with towel   10\"x  10 Look to R    10\"x  10          Standing shoulder extension and row with TB             Seated thoracic extension   10\" x10 10\"x  10                                                                                        Ther Activity                              Gait Training                              Modalities    Moist heat 7 minutes pre treat 7 minutes pre treat L UT    7 min pre treat                            "

## 2025-02-04 ENCOUNTER — OFFICE VISIT (OUTPATIENT)
Dept: PHYSICAL THERAPY | Facility: CLINIC | Age: 80
End: 2025-02-04
Payer: MEDICARE

## 2025-02-04 ENCOUNTER — APPOINTMENT (OUTPATIENT)
Dept: LAB | Facility: MEDICAL CENTER | Age: 80
End: 2025-02-04
Payer: MEDICARE

## 2025-02-04 ENCOUNTER — RESULTS FOLLOW-UP (OUTPATIENT)
Dept: FAMILY MEDICINE CLINIC | Facility: CLINIC | Age: 80
End: 2025-02-04

## 2025-02-04 DIAGNOSIS — R73.09 ABNORMAL BLOOD SUGAR: ICD-10-CM

## 2025-02-04 DIAGNOSIS — M54.2 NECK PAIN: Primary | ICD-10-CM

## 2025-02-04 DIAGNOSIS — R97.20 ELEVATED PSA: ICD-10-CM

## 2025-02-04 DIAGNOSIS — M48.02 FORAMINAL STENOSIS OF CERVICAL REGION: ICD-10-CM

## 2025-02-04 DIAGNOSIS — M50.30 DEGENERATIVE CERVICAL DISC: ICD-10-CM

## 2025-02-04 LAB
ALBUMIN SERPL BCG-MCNC: 4.3 G/DL (ref 3.5–5)
ALP SERPL-CCNC: 55 U/L (ref 34–104)
ALT SERPL W P-5'-P-CCNC: 22 U/L (ref 7–52)
ANION GAP SERPL CALCULATED.3IONS-SCNC: 7 MMOL/L (ref 4–13)
AST SERPL W P-5'-P-CCNC: 22 U/L (ref 13–39)
BILIRUB SERPL-MCNC: 0.78 MG/DL (ref 0.2–1)
BUN SERPL-MCNC: 15 MG/DL (ref 5–25)
CALCIUM SERPL-MCNC: 9.6 MG/DL (ref 8.4–10.2)
CHLORIDE SERPL-SCNC: 104 MMOL/L (ref 96–108)
CO2 SERPL-SCNC: 29 MMOL/L (ref 21–32)
CREAT SERPL-MCNC: 1.03 MG/DL (ref 0.6–1.3)
EST. AVERAGE GLUCOSE BLD GHB EST-MCNC: 131 MG/DL
GFR SERPL CREATININE-BSD FRML MDRD: 67 ML/MIN/1.73SQ M
GLUCOSE P FAST SERPL-MCNC: 98 MG/DL (ref 65–99)
HBA1C MFR BLD: 6.2 %
POTASSIUM SERPL-SCNC: 4.6 MMOL/L (ref 3.5–5.3)
PROT SERPL-MCNC: 7.5 G/DL (ref 6.4–8.4)
PSA SERPL-MCNC: 9.25 NG/ML (ref 0–4)
SODIUM SERPL-SCNC: 140 MMOL/L (ref 135–147)

## 2025-02-04 PROCEDURE — 36415 COLL VENOUS BLD VENIPUNCTURE: CPT

## 2025-02-04 PROCEDURE — 83036 HEMOGLOBIN GLYCOSYLATED A1C: CPT

## 2025-02-04 PROCEDURE — 97112 NEUROMUSCULAR REEDUCATION: CPT

## 2025-02-04 PROCEDURE — 84153 ASSAY OF PSA TOTAL: CPT

## 2025-02-04 PROCEDURE — 97110 THERAPEUTIC EXERCISES: CPT

## 2025-02-04 PROCEDURE — 97140 MANUAL THERAPY 1/> REGIONS: CPT

## 2025-02-04 PROCEDURE — 80053 COMPREHEN METABOLIC PANEL: CPT

## 2025-02-04 NOTE — PROGRESS NOTES
Daily Note     Today's date: 2025  Patient name: Leonel Epperson  : 1945  MRN: [id number]  Referring provider: Lazaro House MD  Dx:   Encounter Diagnosis     ICD-10-CM    1. Neck pain  M54.2       2. Degenerative cervical disc  M50.30       3. Foraminal stenosis of cervical region  M48.02                      Subjective: Pt states that he felt pretty good following last session for a few days. Reports that his neck started bothering him again with the exercises yesterday though.      Objective: See treatment diary below      Assessment: Tolerated treatment well. Gentle manual techniques and grade 1 joint mobilizations were performed to C2-C7 levels to decrease joint hypomobility and to improve cervicothoracic ROM. Able to achieve an increase of 15 degrees of L cervical rotation following manual therapies. Additional postural strengthening activities were performed to improve scapular retraction activation and strength. Patient demonstrated fatigue post treatment and would benefit from continued PT      Plan: Continue per plan of care.      Precautions:   Patient Active Problem List   Diagnosis    Benign essential hypertension    Abnormal blood sugar    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(HCC)    Arthritis    Benign prostatic hyperplasia with urinary hesitancy    Cervical strain    Primary cutaneous anaplastic large cell B-cell lymphoma    Radiculopathy, lumbar region    Spinal stenosis of lumbar region without neurogenic claudication    Chronic bilateral low back pain without sciatica    Chronic pain syndrome    Nonintractable headache    Chronic pain of right thumb    Vasovagal syncope    Palpitations    Elevated PSA    Kidney lesion, native, left    Pancreatic lesion    Memory problem    Dilated aortic root (HCC)    Eczema    Hemorrhoids    Right wrist pain    Chronic cough    ALANIS (dyspnea on exertion)    Pulmonary granuloma (HCC)    Lymphoma (HCC)    Hypertension    Status post lumbar spine surgery for decompression  "of spinal cord     Daily Treatment Diary    Date 1/22 1/28 1/31 2/4         FOTO IE -             Re-Eval IE               Manuals    C4-C7 down glide on the L JM - grade 1  Grade 1 -  JM Grade 1 -  JM         UT stretch JM  JM   L UT Scap depression - EH    L UT STM - EH JM   L UT         Lateral joint mobs with movement JM - grade 1  Grade 1 -  JM HOLD         Manual cervical traction  JM - grade 1 JM STM  sub EH JM grade 1                      Neuro Re-Ed     Cervical retraction  2x10  2x10 2x10 2x10 with towel    With extension 2x10          HEP        Scapular retraction  Green TB 2x10  Green TB 2x10   OTB 2x10                                                                           Ther Ex    Arm bike - ROM             UT stretch 30\" hold 3x (L) 30\" hold 3x (L) L    10\"x  10 - assist to return          SNAG with towel   10\"x  10 Look to R    10\"x  10 Seated  to the R 5x  HEP        Standing shoulder extension and row with TB    Ext 10.0 25x    Row 15.0 25x         Seated thoracic extension   10\" x10 10\"x  10                                                                                        Ther Activity                              Gait Training                              Modalities    Moist heat 7 minutes pre treat 7 minutes pre treat L UT    7 min pre treat 6 minutes pre yudy                             "

## 2025-02-06 ENCOUNTER — OFFICE VISIT (OUTPATIENT)
Dept: PHYSICAL THERAPY | Facility: CLINIC | Age: 80
End: 2025-02-06
Payer: MEDICARE

## 2025-02-06 DIAGNOSIS — M50.30 DEGENERATIVE CERVICAL DISC: ICD-10-CM

## 2025-02-06 DIAGNOSIS — M48.02 FORAMINAL STENOSIS OF CERVICAL REGION: ICD-10-CM

## 2025-02-06 DIAGNOSIS — M54.2 NECK PAIN: Primary | ICD-10-CM

## 2025-02-06 PROCEDURE — 97112 NEUROMUSCULAR REEDUCATION: CPT

## 2025-02-06 PROCEDURE — 97140 MANUAL THERAPY 1/> REGIONS: CPT

## 2025-02-06 PROCEDURE — 97110 THERAPEUTIC EXERCISES: CPT

## 2025-02-06 NOTE — PROGRESS NOTES
Daily Note     Today's date: 2025  Patient name: Leonel Epperson  : 1945  MRN: [id number]  Referring provider: Lazaro House MD  Dx:   Encounter Diagnosis     ICD-10-CM    1. Neck pain  M54.2       2. Degenerative cervical disc  M50.30       3. Foraminal stenosis of cervical region  M48.02                      Subjective: Pt states that he is feeling pretty good today. Indicates that he felt pretty good following last session. States that he still has a hard time looking to the L without pain.       Objective: See treatment diary below      Assessment: Tolerated treatment well. Tolerated manual techniques in sitting today. Additional mobilizations with movement were performed to increase C2-C7 upglide on the R and down glide on the L. Continues to show improved AROM in all planes since starting physical therapy. Able to achieve ~45 degrees of L cervical rotation. Patient demonstrated fatigue post treatment and would benefit from continued PT      Plan: Continue per plan of care.      Precautions:   Patient Active Problem List   Diagnosis    Benign essential hypertension    Abnormal blood sugar    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(HCC)    Arthritis    Benign prostatic hyperplasia with urinary hesitancy    Cervical strain    Primary cutaneous anaplastic large cell B-cell lymphoma    Radiculopathy, lumbar region    Spinal stenosis of lumbar region without neurogenic claudication    Chronic bilateral low back pain without sciatica    Chronic pain syndrome    Nonintractable headache    Chronic pain of right thumb    Vasovagal syncope    Palpitations    Elevated PSA    Kidney lesion, native, left    Pancreatic lesion    Memory problem    Dilated aortic root (HCC)    Eczema    Hemorrhoids    Right wrist pain    Chronic cough    ALANIS (dyspnea on exertion)    Pulmonary granuloma (HCC)    Lymphoma (HCC)    Hypertension    Status post lumbar spine surgery for decompression of spinal cord     Daily Treatment Diary    Date   "1/28 1/31 2/4 2/6        FOTO IE -             Re-Eval IE               Manuals    C4-C7 down glide on the L JM - grade 1  Grade 1 -  JM Grade 1 -  JM Grade 1 -  JM (SEATED with up glide on R)        UT stretch JM  JM   L UT Scap depression - EH    L UT STM - EH JM   L UT JM - Left        Lateral joint mobs with movement JM - grade 1  Grade 1 -  JM HOLD         Manual cervical traction  JM - grade 1 JM STM  sub EH JM grade 1 JM grade 1                     Neuro Re-Ed     Cervical retraction  2x10  2x10 2x10 2x10 with towel    With extension 2x10          With extension 2x10         Scapular retraction  Green TB 2x10  Green TB 2x10   OTB 2x10  Green TB 2x10                                                                          Ther Ex    pulleys     3 mins         UT stretch 30\" hold 3x (L) 30\" hold 3x (L) L    10\"x  10 - assist to return          SNAG with towel   10\"x  10 Look to R    10\"x  10 Seated  to the R 5x  Seated  to the R 10x         Standing shoulder extension and row with TB    Ext 10.0 25x    Row 15.0 25x Ext 15.0 25x    Row 20.0 25x        Seated thoracic extension   10\" x10 10\"x  10  2x10                                                                                       Ther Activity                              Gait Training                              Modalities    Moist heat 7 minutes pre treat 7 minutes pre treat L UT    7 min pre treat 6 minutes pre yudy 5 mins pre treatment                              "

## 2025-02-07 ENCOUNTER — OFFICE VISIT (OUTPATIENT)
Dept: UROLOGY | Facility: MEDICAL CENTER | Age: 80
End: 2025-02-07
Payer: MEDICARE

## 2025-02-07 VITALS
DIASTOLIC BLOOD PRESSURE: 80 MMHG | OXYGEN SATURATION: 98 % | SYSTOLIC BLOOD PRESSURE: 120 MMHG | WEIGHT: 228 LBS | BODY MASS INDEX: 30.88 KG/M2 | HEART RATE: 88 BPM | HEIGHT: 72 IN

## 2025-02-07 DIAGNOSIS — N13.8 BPH WITH URINARY OBSTRUCTION: ICD-10-CM

## 2025-02-07 DIAGNOSIS — R97.20 ELEVATED PSA: Primary | ICD-10-CM

## 2025-02-07 DIAGNOSIS — N40.1 BPH WITH URINARY OBSTRUCTION: ICD-10-CM

## 2025-02-07 PROCEDURE — 99213 OFFICE O/P EST LOW 20 MIN: CPT | Performed by: UROLOGY

## 2025-02-10 ENCOUNTER — APPOINTMENT (OUTPATIENT)
Dept: PHYSICAL THERAPY | Facility: CLINIC | Age: 80
End: 2025-02-10
Payer: MEDICARE

## 2025-02-13 ENCOUNTER — OFFICE VISIT (OUTPATIENT)
Dept: PHYSICAL THERAPY | Facility: CLINIC | Age: 80
End: 2025-02-13
Payer: MEDICARE

## 2025-02-13 DIAGNOSIS — M48.02 FORAMINAL STENOSIS OF CERVICAL REGION: ICD-10-CM

## 2025-02-13 DIAGNOSIS — M50.30 DEGENERATIVE CERVICAL DISC: ICD-10-CM

## 2025-02-13 DIAGNOSIS — M54.2 NECK PAIN: Primary | ICD-10-CM

## 2025-02-13 PROCEDURE — 97140 MANUAL THERAPY 1/> REGIONS: CPT

## 2025-02-13 PROCEDURE — 97110 THERAPEUTIC EXERCISES: CPT

## 2025-02-13 NOTE — PROGRESS NOTES
Daily Note     Today's date: 2025  Patient name: Leonel Epperson  : 1945  MRN: [id number]  Referring provider: Lazaro House MD  Dx:   Encounter Diagnosis     ICD-10-CM    1. Neck pain  M54.2       2. Degenerative cervical disc  M50.30       3. Foraminal stenosis of cervical region  M48.02                      Subjective: Pt states that he felt very good following last session. Reports that his neck is bothering him more today than previous days. He also states that the exercises sometimes are fine and sometimes are bothersome; does not push through pain.       Objective: See treatment diary below      Assessment: Tolerated treatment well. Continues to tolerate manual techniques in the seated position vs supine. Continues to progress well with AROM following manual techniques; he also reports an improvement with symptoms following manual therapies. Additional scapular retraction and upward rotation activities were perform to improve postural endurance to limit forward head and rounded shoulders.  Patient demonstrated fatigue post treatment and would benefit from continued PT      Plan: Continue per plan of care.      Precautions:   Patient Active Problem List   Diagnosis    Benign essential hypertension    Abnormal blood sugar    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(HCC)    Arthritis    Benign prostatic hyperplasia with urinary hesitancy    Cervical strain    Primary cutaneous anaplastic large cell B-cell lymphoma    Radiculopathy, lumbar region    Spinal stenosis of lumbar region without neurogenic claudication    Chronic bilateral low back pain without sciatica    Chronic pain syndrome    Nonintractable headache    Chronic pain of right thumb    Vasovagal syncope    Palpitations    Elevated PSA    Kidney lesion, native, left    Pancreatic lesion    Memory problem    Dilated aortic root (HCC)    Eczema    Hemorrhoids    Right wrist pain    Chronic cough    ALANIS (dyspnea on exertion)    Pulmonary granuloma (HCC)     "Lymphoma (HCC)    Hypertension    Status post lumbar spine surgery for decompression of spinal cord     Daily Treatment Diary    Date 1/22 1/28 1/31 2/4 2/6 2/13       FOTO IE -             Re-Eval IE               Manuals    C4-C7 down glide on the L JM - grade 1  Grade 1 -  JM Grade 1 -  JM Grade 1 -  JM (SEATED with up glide on R) Grade 1 -  JM (SEATED with up glide on R)       UT stretch JM  JM   L UT Scap depression - EH    L UT STM - EH JM   L UT JM - Left JM - Left      L UT STM - JM       Lateral joint mobs with movement JM - grade 1  Grade 1 -  JM HOLD         Manual cervical traction  JM - grade 1 JM STM  sub EH JM grade 1 JM grade 1                     Neuro Re-Ed     Cervical retraction  2x10  2x10 2x10 2x10 with towel    With extension 2x10          With extension 2x10  2x10 with towel       Scapular retraction  Green TB 2x10  Green TB 2x10   OTB 2x10  Green TB 2x10                                                                          Ther Ex    Arm bike      2 mins fwd       pulleys     3 mins  3 mins        UT stretch 30\" hold 3x (L) 30\" hold 3x (L) L    10\"x  10 - assist to return          SNAG with towel   10\"x  10 Look to R    10\"x  10 Seated  to the R 5x  Seated  to the R 10x  12x (B)       Standing shoulder extension and row with TB    Ext 10.0 25x    Row 15.0 25x Ext 15.0 25x    Row 20.0 25x Ext 15.0 3x10    Row 20.0 25x       Seated thoracic extension   10\" x10 10\"x  10  2x10         Standing \"robbers\" at wall      2x10        Pec stretch at doorway      10\" 10x                                                           Ther Activity                              Gait Training                              Modalities    Moist heat 7 minutes pre treat 7 minutes pre treat L UT    7 min pre treat 6 minutes pre yudy 5 mins pre treatment 5 mins pre treatment    5 mins post treatment                               "

## 2025-02-14 ENCOUNTER — RA CDI HCC (OUTPATIENT)
Dept: OTHER | Facility: HOSPITAL | Age: 80
End: 2025-02-14

## 2025-02-17 ENCOUNTER — OFFICE VISIT (OUTPATIENT)
Dept: PHYSICAL THERAPY | Facility: CLINIC | Age: 80
End: 2025-02-17
Payer: MEDICARE

## 2025-02-17 DIAGNOSIS — M50.30 DEGENERATIVE CERVICAL DISC: ICD-10-CM

## 2025-02-17 DIAGNOSIS — M54.2 NECK PAIN: Primary | ICD-10-CM

## 2025-02-17 DIAGNOSIS — M48.02 FORAMINAL STENOSIS OF CERVICAL REGION: ICD-10-CM

## 2025-02-17 PROCEDURE — 97140 MANUAL THERAPY 1/> REGIONS: CPT

## 2025-02-17 PROCEDURE — 97110 THERAPEUTIC EXERCISES: CPT

## 2025-02-17 NOTE — PROGRESS NOTES
Daily Note     Today's date: 2025  Patient name: Leonel Epperson  : 1945  MRN: [id number]  Referring provider: Lazaro House MD  Dx:   Encounter Diagnosis     ICD-10-CM    1. Neck pain  M54.2       2. Degenerative cervical disc  M50.30       3. Foraminal stenosis of cervical region  M48.02                      Subjective: Patient does feel that both pain and motion have improved, but still difficulty with turning head, such as when driving. Still gets clicking that he admitted isn't necessarily painful but does bother him.      Objective: See treatment diary below.      Assessment: Continued to focus treatment primarily on manual interventions to address hypertonicity through L UT (noticeable improvement in tension post manual both subjectively and objectively) and joint mobilizations performed by NATASHA, PT to address limited mobility at cervical segments. Good understanding of current HEP and carryover of form with recently progressed postural exercises to address forward head and rounded shoulders. Skilled PT remains appropriate to continue at this time given unresolved pain, ROM, and strength deficits that is limiting his daily tasks and functions.      Plan: Continue per plan of care.          Precautions:   Patient Active Problem List   Diagnosis    Benign essential hypertension    Abnormal blood sugar    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(HCC)    Arthritis    Benign prostatic hyperplasia with urinary hesitancy    Cervical strain    Primary cutaneous anaplastic large cell B-cell lymphoma    Radiculopathy, lumbar region    Spinal stenosis of lumbar region without neurogenic claudication    Chronic bilateral low back pain without sciatica    Chronic pain syndrome    Nonintractable headache    Chronic pain of right thumb    Vasovagal syncope    Palpitations    Elevated PSA    Kidney lesion, native, left    Pancreatic lesion    Memory problem    Dilated aortic root (HCC)    Eczema    Hemorrhoids    Right wrist pain     "Chronic cough    ALANIS (dyspnea on exertion)    Pulmonary granuloma (HCC)    Lymphoma (HCC)    Hypertension    Status post lumbar spine surgery for decompression of spinal cord     Daily Treatment Diary    Date 1/22 1/28 1/31 2/4 2/6 2/13 2/17      FOTO IE -             Re-Eval IE               Manuals    C4-C7 down glide on the L JM - grade 1  Grade 1 -  JM Grade 1 -  JM Grade 1 -  JM (SEATED with up glide on R) Grade 1 -  JM (SEATED with up glide on R) Grade 1 seated ER with up glide on R -     JM      UT stretch JM  JM   L UT Scap depression - EH    L UT STM - EH JM   L UT JM - Left JM - Left      L UT STM - JM L - EH    L UT STM - EH      Lateral joint mobs with movement JM - grade 1  Grade 1 -  JM HOLD         Manual cervical traction  JM - grade 1 JM STM  sub EH JM grade 1 JM grade 1                     Neuro Re-Ed     Cervical retraction  2x10  2x10 2x10 2x10 with towel    With extension 2x10          With extension 2x10  2x10 with towel Towel  2x10      Scapular retraction  Green TB 2x10  Green TB 2x10   OTB 2x10  Green TB 2x10                                                                          Ther Ex    Arm bike      2 mins fwd Fwd  2 min    Bwd  2 min      pulleys     3 mins  3 mins        UT stretch 30\" hold 3x (L) 30\" hold 3x (L) L    10\"x  10 - assist to return          SNAG with towel   10\"x  10 Look to R    10\"x  10 Seated  to the R 5x  Seated  to the R 10x  12x (B) x12  bilat      Standing shoulder extension and row with TB    Ext 10.0 25x    Row 15.0 25x Ext 15.0 25x    Row 20.0 25x Ext 15.0 3x10    Row 20.0 25x Ext  15.0  3x10    Row  20.0  2x10      Seated thoracic extension   10\" x10 10\"x  10  2x10         Standing \"robbers\" at wall      2x10  2x10      Pec stretch at doorway      10\" 10x 10\"x 10                                                          Ther Activity                              Gait Training                              Modalities    Moist heat 7 minutes pre treat 7 minutes " pre treat L UT    7 min pre treat 6 minutes pre yudy 5 mins pre treatment 5 mins pre treatment    5 mins post treatment 5 min pre manual

## 2025-02-19 ENCOUNTER — OFFICE VISIT (OUTPATIENT)
Dept: PHYSICAL THERAPY | Facility: CLINIC | Age: 80
End: 2025-02-19
Payer: MEDICARE

## 2025-02-19 DIAGNOSIS — M50.30 DEGENERATIVE CERVICAL DISC: ICD-10-CM

## 2025-02-19 DIAGNOSIS — M48.02 FORAMINAL STENOSIS OF CERVICAL REGION: ICD-10-CM

## 2025-02-19 DIAGNOSIS — M54.2 NECK PAIN: Primary | ICD-10-CM

## 2025-02-19 PROCEDURE — 97140 MANUAL THERAPY 1/> REGIONS: CPT

## 2025-02-19 PROCEDURE — 97110 THERAPEUTIC EXERCISES: CPT

## 2025-02-19 NOTE — PROGRESS NOTES
Daily Note     Today's date: 2025  Patient name: Leonel Epperson  : 1945  MRN: [id number]  Referring provider: Lazaro House MD  Dx:   Encounter Diagnosis     ICD-10-CM    1. Neck pain  M54.2       2. Degenerative cervical disc  M50.30       3. Foraminal stenosis of cervical region  M48.02                      Subjective: Pt states that he is doing well overall today. Reports that he felt pretty good following last session. Still getting cracking/popping with looking to the L.       Objective: See treatment diary below      Assessment: Tolerated treatment well. Continued to performed cervical mobility, ROM, STM, and TpR techniques in seated  to limited increased pain with the supine position or prone position. Slight progressions made with scapular and deep neck flexor musculature to increase postural awareness and endurance. Patient demonstrated fatigue post treatment and would benefit from continued PT      Plan: Continue per plan of care.      Precautions:   Patient Active Problem List   Diagnosis    Benign essential hypertension    Abnormal blood sugar    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(HCC)    Arthritis    Benign prostatic hyperplasia with urinary hesitancy    Cervical strain    Primary cutaneous anaplastic large cell B-cell lymphoma    Radiculopathy, lumbar region    Spinal stenosis of lumbar region without neurogenic claudication    Chronic bilateral low back pain without sciatica    Chronic pain syndrome    Nonintractable headache    Chronic pain of right thumb    Vasovagal syncope    Palpitations    Elevated PSA    Kidney lesion, native, left    Pancreatic lesion    Memory problem    Dilated aortic root (HCC)    Eczema    Hemorrhoids    Right wrist pain    Chronic cough    ALANIS (dyspnea on exertion)    Pulmonary granuloma (HCC)    Lymphoma (HCC)    Hypertension    Status post lumbar spine surgery for decompression of spinal cord     Daily Treatment Diary    Date  2    "  FOTO IE -             Re-Eval IE               Manuals    C4-C7 down glide on the L JM - grade 1  Grade 1 -  JM Grade 1 -  JM Grade 1 -  JM (SEATED with up glide on R) Grade 1 -  JM (SEATED with up glide on R) Grade 1 seated ER with up glide on R -     JM Grade 1 seated ER with up glide on R -     JM     UT stretch JM  JM   L UT Scap depression - EH    L UT STM - EH JM   L UT JM - Left JM - Left      L UT STM - JM L - EH    L UT STM - EH JM - Left      L UT STM - JM     Lateral joint mobs with movement JM - grade 1  Grade 1 -  JM HOLD         Manual cervical traction  JM - grade 1 JM STM  sub EH JM grade 1 JM grade 1                     Neuro Re-Ed     Cervical retraction  2x10  2x10 2x10 2x10 with towel    With extension 2x10          With extension 2x10  2x10 with towel Towel  2x10 Seated with plygrd ball on wall 25x 3\" hold     Scapular retraction  Green TB 2x10  Green TB 2x10   OTB 2x10  Green TB 2x10                                                                          Ther Ex    Arm bike      2 mins fwd Fwd  2 min    Bwd  2 min Fwd  2 min    Bwd  2 min     pulleys     3 mins  3 mins   2 mins     UT stretch 30\" hold 3x (L) 30\" hold 3x (L) L    10\"x  10 - assist to return          SNAG with towel   10\"x  10 Look to R    10\"x  10 Seated  to the R 5x  Seated  to the R 10x  12x (B) x12  bilat 10x (L)     Standing shoulder extension and row with TB    Ext 10.0 25x    Row 15.0 25x Ext 15.0 25x    Row 20.0 25x Ext 15.0 3x10    Row 20.0 25x Ext  15.0  3x10    Row  20.0  2x10 Ext  17.0  3x10    Row  22.0  2x12     Seated thoracic extension   10\" x10 10\"x  10  2x10         Standing \"robbers\" at wall      2x10  2x10 2x10     Pec stretch at doorway      10\" 10x 10\"x 10      Standing shoulder ER with flexion         Red TB 2x10 (B)                                            Ther Activity                              Gait Training                              Modalities    Moist heat 7 minutes pre treat 7 minutes pre " treat L UT    7 min pre treat 6 minutes pre yudy 5 mins pre treatment 5 mins pre treatment    5 mins post treatment 5 min pre manual 5 mins pre treatment    5 mins post treatment

## 2025-02-20 ENCOUNTER — OFFICE VISIT (OUTPATIENT)
Dept: FAMILY MEDICINE CLINIC | Facility: CLINIC | Age: 80
End: 2025-02-20
Payer: MEDICARE

## 2025-02-20 VITALS
WEIGHT: 230 LBS | SYSTOLIC BLOOD PRESSURE: 116 MMHG | TEMPERATURE: 98 F | HEART RATE: 86 BPM | OXYGEN SATURATION: 98 % | DIASTOLIC BLOOD PRESSURE: 78 MMHG | BODY MASS INDEX: 31.19 KG/M2

## 2025-02-20 DIAGNOSIS — I77.810 DILATED AORTIC ROOT (HCC): ICD-10-CM

## 2025-02-20 DIAGNOSIS — Z13.220 SCREENING CHOLESTEROL LEVEL: ICD-10-CM

## 2025-02-20 DIAGNOSIS — J84.10 PULMONARY GRANULOMA (HCC): ICD-10-CM

## 2025-02-20 DIAGNOSIS — R97.20 ELEVATED PSA: ICD-10-CM

## 2025-02-20 DIAGNOSIS — K86.9 PANCREATIC LESION: ICD-10-CM

## 2025-02-20 DIAGNOSIS — I10 BENIGN ESSENTIAL HYPERTENSION: Primary | ICD-10-CM

## 2025-02-20 DIAGNOSIS — M48.061 SPINAL STENOSIS OF LUMBAR REGION WITHOUT NEUROGENIC CLAUDICATION: ICD-10-CM

## 2025-02-20 DIAGNOSIS — R73.09 ABNORMAL BLOOD SUGAR: ICD-10-CM

## 2025-02-20 DIAGNOSIS — C85.80: ICD-10-CM

## 2025-02-20 PROCEDURE — 99214 OFFICE O/P EST MOD 30 MIN: CPT | Performed by: FAMILY MEDICINE

## 2025-02-20 PROCEDURE — G2211 COMPLEX E/M VISIT ADD ON: HCPCS | Performed by: FAMILY MEDICINE

## 2025-02-20 NOTE — ASSESSMENT & PLAN NOTE
Blood pressure 116/78.  Continue amlodipine 5  Orders:  •  CBC and differential; Future  •  TSH, 3rd generation with Free T4 reflex; Future

## 2025-02-20 NOTE — ASSESSMENT & PLAN NOTE
History of elevated PSA.  MRI showed possible focal cancer.  Patient being monitored by serial PSAs.  Continue follow-up with urology as directed

## 2025-02-20 NOTE — ASSESSMENT & PLAN NOTE
A1c from February 7 has increased to 6.2% (was 5.8%).  Patient admits that his diet has not been very good over the holidays.  He will work to improve this.  Will recheck labs prior to next appointment.  Orders:  •  Comprehensive metabolic panel; Future  •  Hemoglobin A1C; Future

## 2025-02-20 NOTE — PROGRESS NOTES
Name: Leonel Epperson      : 1945      MRN: [id number]  Encounter Provider: Boni Saldana DO  Encounter Date: 2025   Encounter department: [facility]    Assessment & Plan  Benign essential hypertension  Blood pressure 116/78.  Continue amlodipine 5  Orders:  •  CBC and differential; Future  •  TSH, 3rd generation with Free T4 reflex; Future    Abnormal blood sugar  A1c from  has increased to 6.2% (was 5.8%).  Patient admits that his diet has not been very good over the holidays.  He will work to improve this.  Will recheck labs prior to next appointment.  Orders:  •  Comprehensive metabolic panel; Future  •  Hemoglobin A1C; Future    Elevated PSA  History of elevated PSA.  MRI showed possible focal cancer.  Patient being monitored by serial PSAs.  Continue follow-up with urology as directed       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(HCC)  Stable.  Continue regular follow-up with dermatology as directed       Pancreatic lesion  History of IPMN.  Being followed by surgical oncology with serial MRIs.  Last was in .  Next planned for        Dilated aortic root (HCC)  History of dilated aortic root.  Continue follow-up with cardiology as directed       Spinal stenosis of lumbar region without neurogenic claudication  Status post spinal decompression surgery in .  Symptoms currently stable       Screening cholesterol level    Orders:  •  Lipid Panel with Direct LDL reflex; Future    Pulmonary granuloma (HCC)                Patient current with COVID booster  Patient had flu shot this season  Patient had RSV vaccine  Current with pneumonia vaccination  Last tetanus booster   Patient had Shingrix    Lab results from  reviewed with patient    6 months, fasting blood work prior (PSA ordered by urology)      History of Present Illness     Patient presents for recheck of chronic medical problems today.  Prescribed medications.  He had labs drawn on .      Review  of Systems   Respiratory: Negative.     Cardiovascular: Negative.    Gastrointestinal: Negative.    Genitourinary: Negative.      Past Medical History:   Diagnosis Date   • Abnormal blood chemistry     last assessed: 5/1/2013   • Abnormal blood sugar     last assessed: 12/18/2014   • Allergic    • Allergic rhinitis     last assessed: 11/8/2013   • Arthritis    • Basal cell carcinoma      Face   • BPH (benign prostatic hypertrophy)    • Cancer (HCC)    • Diabetes mellitus (HCC)     last assessed: 4/30/2013   • Headache(784.0)    • Hypertension    • Low back pain    • Nodule of finger of right hand     last assessed: 10/19/2015   • Obstruction of right eustachian tube     last assessed: 10/11/2016     Past Surgical History:   Procedure Laterality Date   • CATARACT EXTRACTION, BILATERAL     • COLONOSCOPY  1999 1 polyp   • COLONOSCOPY  2007    Hyperplastic polyp   • COLONOSCOPY  2009     And 2013 Normal   • COLONOSCOPY  12/29/2017    15 mm polyp proximal sigmoid colon-tubular adenoma.  Diverticulosis by Dr. Rich   • INGUINAL HERNIA REPAIR     • SPINE SURGERY  9/14/2020     Family History   Problem Relation Age of Onset   • Heart failure Mother    • Arthritis Mother    • Vision loss Mother    • Dementia Father    • Arthritis Father    • Colon cancer Maternal Grandmother         or stomach (unsure)   • Cancer Maternal Grandmother    • Colon cancer Son 42        2x and liver   • Heart disease Sister         Heart valve replaced   • Heart disease Brother         Heart valve problem   • Heart attack Paternal Grandfather    • Heart attack Paternal Grandmother      Social History     Tobacco Use   • Smoking status: Never   • Smokeless tobacco: Never   Vaping Use   • Vaping status: Never Used   Substance and Sexual Activity   • Alcohol use: Yes     Comment: Couple beers a year   • Drug use: No   • Sexual activity: Not Currently     Partners: Female     Birth control/protection: None     Current Outpatient Medications on  File Prior to Visit   Medication Sig   • albuterol (PROVENTIL HFA,VENTOLIN HFA) 90 mcg/act inhaler Inhale 2 puffs every 6 (six) hours as needed for wheezing or shortness of breath   • amLODIPine (NORVASC) 5 mg tablet Take 1 tablet (5 mg total) by mouth daily   • aspirin 81 mg chewable tablet Chew 81 mg daily   • cetirizine (ZyrTEC) 5 MG chewable tablet Chew 5 mg daily   • cholecalciferol (VITAMIN D3) 1,000 units tablet Take 1 tablet by mouth daily   • clobetasol (TEMOVATE) 0.05 % ointment    • Diclofenac Sodium (Voltaren) 1 % Apply 2 g topically 4 (four) times a day   • hydrocortisone 2.5 % cream Apply topically 2 (two) times a day prn   • Multiple Vitamins-Minerals (CENTRUM SILVER 50+MEN PO) Take 1 tablet by mouth daily     • tamsulosin (FLOMAX) 0.4 mg TAKE 1 CAPSULE BY MOUTH EVERY DAY WITH DINNER   • tobramycin (TOBREX) 0.3 % SOLN Administer 1 drop to both eyes every 4 (four) hours while awake     Allergies   Allergen Reactions   • Doxycycline    • Amoxicillin Diarrhea   • Prednisone Palpitations     Medrol Dose pack ok    • Sulfamethoxazole-Trimethoprim      Septra     Immunization History   Administered Date(s) Administered   • COVID-19 MODERNA VACC 0.5 ML IM 01/11/2021, 02/08/2021, 10/22/2021, 04/01/2022   • COVID-19 Moderna Vac BIVALENT 12 Yr+ IM 0.5 ML 09/06/2022   • COVID-19 Pfizer mRNA vacc PF tyrel-sucrose 12 yr and older (Comirnaty) 09/25/2023, 10/07/2024   • COVID-19, unspecified 10/22/2021, 04/01/2022   • INFLUENZA 09/22/2015, 09/19/2016, 09/15/2018, 09/26/2019, 10/14/2020, 09/24/2021, 10/12/2022, 09/22/2023   • Influenza Split High Dose Preservative Free IM 10/10/2014, 09/22/2015, 09/19/2016, 08/31/2017   • Influenza, high dose seasonal 0.7 mL 09/26/2019, 10/14/2020   • Influenza, seasonal, injectable 09/14/2012   • Pneumococcal Conjugate 13-Valent 07/09/2015   • Pneumococcal Polysaccharide PPV23 11/01/2006, 01/29/2020   • Respiratory Syncytial Virus Vaccine (Recombinant) 01/03/2024   • Tdap 09/01/2010,  08/12/2022   • Zoster 07/18/2008   • Zoster Vaccine Recombinant 04/15/2019, 06/17/2019   • influenza, trivalent, adjuvanted 10/07/2024     Objective   /78 (BP Location: Left arm, Patient Position: Sitting, Cuff Size: Large)   Pulse 86   Temp 98 °F (36.7 °C) (Temporal)   Wt 104 kg (230 lb)   SpO2 98%   BMI 31.19 kg/m²     Physical Exam  Constitutional:       Appearance: Normal appearance.   Eyes:      Pupils: Pupils are equal, round, and reactive to light.   Neck:      Vascular: No carotid bruit.   Cardiovascular:      Rate and Rhythm: Normal rate and regular rhythm.      Pulses: Normal pulses.      Heart sounds: Normal heart sounds. No murmur heard.     No gallop.   Pulmonary:      Effort: Pulmonary effort is normal.      Breath sounds: Normal breath sounds.   Neurological:      Mental Status: He is alert.   Psychiatric:         Mood and Affect: Mood normal.         Behavior: Behavior normal.

## 2025-02-20 NOTE — ASSESSMENT & PLAN NOTE
History of IPMN.  Being followed by surgical oncology with serial MRIs.  Last was in 2024.  Next planned for 2026

## 2025-02-24 ENCOUNTER — APPOINTMENT (OUTPATIENT)
Dept: PHYSICAL THERAPY | Facility: CLINIC | Age: 80
End: 2025-02-24
Payer: MEDICARE

## 2025-02-26 ENCOUNTER — OFFICE VISIT (OUTPATIENT)
Dept: PHYSICAL THERAPY | Facility: CLINIC | Age: 80
End: 2025-02-26
Payer: MEDICARE

## 2025-02-26 DIAGNOSIS — M48.02 FORAMINAL STENOSIS OF CERVICAL REGION: ICD-10-CM

## 2025-02-26 DIAGNOSIS — M50.30 DEGENERATIVE CERVICAL DISC: ICD-10-CM

## 2025-02-26 DIAGNOSIS — M54.2 NECK PAIN: Primary | ICD-10-CM

## 2025-02-26 PROCEDURE — 97110 THERAPEUTIC EXERCISES: CPT

## 2025-02-26 PROCEDURE — 97140 MANUAL THERAPY 1/> REGIONS: CPT

## 2025-02-26 NOTE — PROGRESS NOTES
Daily Note + Discharge Note    Today's date: 2025  Patient name: Leonel Epperson  : 1945  MRN: [id number]  Referring provider: Lazaro House MD  Dx:   Encounter Diagnosis     ICD-10-CM    1. Neck pain  M54.2       2. Degenerative cervical disc  M50.30       3. Foraminal stenosis of cervical region  M48.02                      Subjective: Pt states that he is doing well overall. Indicates that he is happy with his neck ROM and his symptoms levels over the last week or so and is confident with the HEP. Hopes to end form      Objective: See treatment diary below    Pain  Current pain ratin  At best pain ratin  At worst pain ratin  Quality: pulling      Assessment: Tolerated treatment well. Patient demonstrated fatigue post treatment, exhibited good technique with therapeutic exercises, and would benefit from continued PT, plan to transition to HEP. Recommended to continue with HEP, which was reviewed and performed with patient today, regularly over the next weeks and months to continue to improve joint mobility, postural strength, and cervical ROM within tolerance.   Since starting physical therapy, patient has demonstrate significant improvement with reported symptoms, exhibited improve cervical extension and ROM, and demonstrate increased activity tolerance and functional mobility.     Plan:    Advised patient to continue with home exercise program which I reviewed with them today. Advised patient to contact me with any future questions or concerns regarding exercise. Pt is in agreement with discharge plan.       Precautions:   Patient Active Problem List   Diagnosis    Benign essential hypertension    Abnormal blood sugar    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(HCC)    Arthritis    Benign prostatic hyperplasia with urinary hesitancy    Cervical strain    Primary cutaneous anaplastic large cell B-cell lymphoma    Radiculopathy, lumbar region    Spinal stenosis of lumbar region without neurogenic claudication  "   Chronic bilateral low back pain without sciatica    Chronic pain syndrome    Nonintractable headache    Chronic pain of right thumb    Vasovagal syncope    Palpitations    Elevated PSA    Kidney lesion, native, left    Pancreatic lesion    Memory problem    Dilated aortic root (HCC)    Eczema    Hemorrhoids    Right wrist pain    Chronic cough    ALANIS (dyspnea on exertion)    Pulmonary granuloma (HCC)    Lymphoma (HCC)    Hypertension    Status post lumbar spine surgery for decompression of spinal cord     Daily Treatment Diary    Date 1/22 1/28 1/31 2/4 2/6 2/13 2/17 2/19 2/26    FOTO IE -             Re-Eval IE               Manuals    C4-C7 down glide on the L JM - grade 1  Grade 1 -  JM Grade 1 -  JM Grade 1 -  JM (SEATED with up glide on R) Grade 1 -  JM (SEATED with up glide on R) Grade 1 seated ER with up glide on R -     JM Grade 1 seated ER with up glide on R -     JM Grade 1 seated ER with up glide on R -   JM    UT stretch JM  JM   L UT Scap depression - EH    L UT STM - EH JM   L UT JM - Left JM - Left      L UT STM - JM L - EH    L UT STM - EH JM - Left      L UT STM - JM JM - Left      L UT STM - JM    Lateral joint mobs with movement JM - grade 1  Grade 1 -  JM HOLD         Manual cervical traction  JM - grade 1 JM STM  sub EH JM grade 1 JM grade 1                     Neuro Re-Ed     Cervical retraction  2x10  2x10 2x10 2x10 with towel    With extension 2x10          With extension 2x10  2x10 with towel Towel  2x10 Seated with plygrd ball on wall 25x 3\" hold 2x10 with towel HEP   Scapular retraction  Green TB 2x10  Green TB 2x10   OTB 2x10  Green TB 2x10     Green TB 2x10  HEP                                                                    Ther Ex    Arm bike      2 mins fwd Fwd  2 min    Bwd  2 min Fwd  2 min    Bwd  2 min Fwd  2 min    Bwd  2 min    pulleys     3 mins  3 mins   2 mins 2 mins    UT stretch 30\" hold 3x (L) 30\" hold 3x (L) L    10\"x  10 - assist to return       HEP   SNAG with " "towel   10\"x  10 Look to R    10\"x  10 Seated  to the R 5x  Seated  to the R 10x  12x (B) x12  bilat 10x (L) 10x (B) HEP   Standing shoulder extension and row with TB    Ext 10.0 25x    Row 15.0 25x Ext 15.0 25x    Row 20.0 25x Ext 15.0 3x10    Row 20.0 25x Ext  15.0  3x10    Row  20.0  2x10 Ext  17.0  3x10    Row  22.0  2x12 Pink TB 3x10 ea  HEP ea   Seated thoracic extension   10\" x10 10\"x  10  2x10         Standing \"robbers\" at wall      2x10  2x10 2x10     Pec stretch at doorway      10\" 10x 10\"x 10  10\" hold 10x    Standing shoulder ER with flexion         Red TB 2x10 (B)                                            Ther Activity                              Gait Training                              Modalities    Moist heat 7 minutes pre treat 7 minutes pre treat L UT    7 min pre treat 6 minutes pre yudy 5 mins pre treatment 5 mins pre treatment    5 mins post treatment 5 min pre manual 5 mins pre treatment    5 mins post treatment 5 mins pre treatment                          "

## 2025-04-10 NOTE — PROGRESS NOTES
Cardiology Consultation     Leonel Epperson  [id number]  1945  [facility] CARDIOLOGY [place]  [address] [place] [id number]      1. Dilated aortic root (HCC)  POCT ECG      2. Benign essential hypertension  POCT ECG      3. Palpitations        4. Vasovagal syncope              Discussion/Summary:  Precordial chest pain  - Had a ED visit on 8/28/2024 for chest discomfort and left arm pain and numbness  - Does not appear to be cardiac in nature, more likely from cervical spine stenosis  - If pain worsens or changes, could consider stress test in the future, but will hold off for now  Palpitations  - History of frequent PVCs  - 48-hour Holter monitor 12/16/2021 showed 3.9% VE burden  - Still has occasional palpitations, but remains relatively rare and well-controlled  History of vasovagal syncope  -Had a vasovagal syncope episode after undergoing Mohs surgery  - No recurrent episodes since then  Hypertension  - Continue with amlodipine 5 mg daily  - Blood pressure is well-controlled today  - Also on aspirin 81 mg daily  Large B-cell lymphoma  -Underwent radiation therapy of his left thigh in November 2019  - Reports being in remission currently  Ascending aortic aneurysm  - TTE 11/15/2024 showed EF 58%, borderline biatrial enlargement, mild TR, aortic root 4.2 cm, ascending aorta 3.9 cm  - TTE 11/15/2023 showed EF 55%, grade 1 DD, mild LA, mild MR, aortic root 3.9 cm, sinus of Valsalva 4.4 cm, ascending aorta 3.9 cm  - CTA chest 11/14/2022 showed probable mild ectasia of the sinus of Valsalva measuring 4.2 cm  - Recommended he avoid heavy lifting  - At next follow-up appointment we will decide if you want to repeat the echocardiogram  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Pancreatic lesion  Spinal stenosis     Follow-up in 6 months.    History of Present Illness:  Leonel Epperson is a 80 y.o. year old male with a past medical history of hypertension, dilated aortic root, Angioendotheliomatosis, spinal stenosis, B cell  lymphoma, and BPH.    10/29/2024: Patient was in the emergency department at [facility] on 8/28/2024 with chest pain.  CTA was negative for PE or other acute findings.  His other blood work was otherwise unremarkable. He reports having pain in his neck when turning his head which appears to be from cervical spinal stenosis.  Prior to the ED visit, he reports having some discomfort in his chest followed by a electric shock down his left arm followed by left arm numbness.  ED was unremarkable for any cardiac etiology.  More likely from his cervical neck disease.  Denies any exertional chest pain.  Has had recurrent episodes of the arm electrical shocks, but no recurrent numbness.    Interval history:  He reports feeling okay today.  No recurrent episodes of the chest discomfort or arm pain since last visit.  Denies any exertional chest pain, palpitations, lower extremity edema, or other concerning cardiac symptoms at this time.  He was driving a family member to get blood work and she passed away suddenly in the car with him.  He has been working on handling her estate since she has passed away.    Patient Active Problem List   Diagnosis    Benign essential hypertension    Abnormal blood sugar    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(HCC)    Arthritis    Benign prostatic hyperplasia with urinary hesitancy    Cervical strain    Primary cutaneous anaplastic large cell B-cell lymphoma    Radiculopathy, lumbar region    Spinal stenosis of lumbar region without neurogenic claudication    Chronic bilateral low back pain without sciatica    Chronic pain syndrome    Nonintractable headache    Chronic pain of right thumb    Vasovagal syncope    Palpitations    Elevated PSA    Kidney lesion, native, left    Pancreatic lesion    Memory problem    Dilated aortic root (HCC)    Eczema    Hemorrhoids    Right wrist pain    Chronic cough    ALANIS (dyspnea on exertion)    Pulmonary granuloma (HCC)    Lymphoma (HCC)    Hypertension    Status post  lumbar spine surgery for decompression of spinal cord     Past Medical History:   Diagnosis Date    Abnormal blood chemistry     last assessed: 5/1/2013    Abnormal blood sugar     last assessed: 12/18/2014    Allergic     Allergic rhinitis     last assessed: 11/8/2013    Arthritis     Basal cell carcinoma      Face    BPH (benign prostatic hypertrophy)     Cancer (HCC)     Diabetes mellitus (HCC)     last assessed: 4/30/2013    Headache(784.0)     Hypertension     Low back pain     Nodule of finger of right hand     last assessed: 10/19/2015    Obstruction of right eustachian tube     last assessed: 10/11/2016     Social History     Socioeconomic History    Marital status: /Civil Union     Spouse name: Not on file    Number of children: Not on file    Years of education: Not on file    Highest education level: Not on file   Occupational History    Occupation: RETIRED-   Tobacco Use    Smoking status: Never    Smokeless tobacco: Never   Vaping Use    Vaping status: Never Used   Substance and Sexual Activity    Alcohol use: Yes     Comment: Couple beers a year    Drug use: No    Sexual activity: Not Currently     Partners: Female     Birth control/protection: None   Other Topics Concern    Not on file   Social History Narrative    DOES NOT CONSUME CAFFEINE     Social Drivers of Health     Financial Resource Strain: Low Risk  (8/21/2023)    Overall Financial Resource Strain (CARDIA)     Difficulty of Paying Living Expenses: Not very hard   Food Insecurity: No Food Insecurity (8/29/2024)    Nursing - Inadequate Food Risk Classification     Worried About Running Out of Food in the Last Year: Never true     Ran Out of Food in the Last Year: Never true     Ran Out of Food in the Last Year: Not on file   Transportation Needs: No Transportation Needs (8/29/2024)    PRAPARE - Transportation     Lack of Transportation (Medical): No     Lack of Transportation (Non-Medical): No   Physical Activity: Not on file   Stress:  Not on file   Social Connections: Not on file   Intimate Partner Violence: Not on file   Housing Stability: Low Risk  (8/29/2024)    Housing Stability Vital Sign     Unable to Pay for Housing in the Last Year: No     Number of Times Moved in the Last Year: 0     Homeless in the Last Year: No      Family History   Problem Relation Age of Onset    Heart failure Mother     Arthritis Mother     Vision loss Mother     Dementia Father     Arthritis Father     Colon cancer Maternal Grandmother         or stomach (unsure)    Cancer Maternal Grandmother     Colon cancer Son 42        2x and liver    Heart disease Sister         Heart valve replaced    Heart disease Brother         Heart valve problem    Heart attack Paternal Grandfather     Heart attack Paternal Grandmother      Past Surgical History:   Procedure Laterality Date    CATARACT EXTRACTION, BILATERAL      COLONOSCOPY  1999 1 polyp    COLONOSCOPY  2007    Hyperplastic polyp    COLONOSCOPY  2009     And 2013 Normal    COLONOSCOPY  12/29/2017    15 mm polyp proximal sigmoid colon-tubular adenoma.  Diverticulosis by Dr. Rich    INGUINAL HERNIA REPAIR      SPINE SURGERY  9/14/2020       Current Outpatient Medications:     albuterol (PROVENTIL HFA,VENTOLIN HFA) 90 mcg/act inhaler, Inhale 2 puffs every 6 (six) hours as needed for wheezing or shortness of breath, Disp: 8 g, Rfl: 0    amLODIPine (NORVASC) 5 mg tablet, Take 1 tablet (5 mg total) by mouth daily, Disp: 90 tablet, Rfl: 3    aspirin 81 mg chewable tablet, Chew 81 mg daily, Disp: , Rfl:     cetirizine (ZyrTEC) 5 MG chewable tablet, Chew 5 mg daily, Disp: , Rfl:     cholecalciferol (VITAMIN D3) 1,000 units tablet, Take 1 tablet by mouth daily, Disp: , Rfl:     clobetasol (TEMOVATE) 0.05 % ointment, , Disp: , Rfl: 1    Diclofenac Sodium (Voltaren) 1 %, Apply 2 g topically 4 (four) times a day, Disp: , Rfl:     hydrocortisone 2.5 % cream, Apply topically 2 (two) times a day prn, Disp: 30 g, Rfl: 2    Multiple  Vitamins-Minerals (CENTRUM SILVER 50+MEN PO), Take 1 tablet by mouth daily  , Disp: , Rfl:     tamsulosin (FLOMAX) 0.4 mg, TAKE 1 CAPSULE BY MOUTH EVERY DAY WITH DINNER, Disp: 90 capsule, Rfl: 1    tobramycin (TOBREX) 0.3 % SOLN, Administer 1 drop to both eyes every 4 (four) hours while awake, Disp: 5 mL, Rfl: 0  Allergies   Allergen Reactions    Doxycycline     Amoxicillin Diarrhea    Prednisone Palpitations     Medrol Dose pack ok     Sulfamethoxazole-Trimethoprim      Septra         Labs:  Lab Results   Component Value Date    ALT 22 2025    AST 22 2025    BUN 15 2025    CALCIUM 9.6 2025     2025    CHOL 157 2015    CO2 29 2025    CREATININE 1.03 2025    HDL 51 2024    HCT 46.7 2024    HGB 15.2 2024    HGBA1C 6.2 (H) 2025     2024    K 4.6 2025    PSA 9.251 (H) 2025     2015    TRIG 81 2024    WBC 5.07 2024       Imaging: No results found.    EC2025: Normal sinus rhythm, low voltage QRS    Review of Systems:  Review of Systems   Constitutional:  Negative for chills, diaphoresis, fatigue and fever.   HENT:  Negative for congestion.    Eyes:  Negative for photophobia and visual disturbance.   Respiratory:  Negative for chest tightness and shortness of breath.    Cardiovascular:  Positive for chest pain. Negative for palpitations and leg swelling.   Gastrointestinal:  Negative for abdominal distention, abdominal pain, diarrhea, nausea and vomiting.   Genitourinary:  Negative for difficulty urinating and dysuria.   Musculoskeletal:  Positive for arthralgias and myalgias. Negative for gait problem and joint swelling.   Skin:  Negative for color change, pallor and rash.   Neurological:  Negative for dizziness, syncope, numbness and headaches.   Psychiatric/Behavioral:  Negative for agitation, behavioral problems and confusion.          Vitals:    25 0903   BP: 116/60   Pulse: 73         Vitals:    04/11/25 0903   Weight: 102 kg (225 lb 3.2 oz)       Height: 6' (182.9 cm)     Physical Exam:  General appearance:  Appears stated age, alert, well appearing and in no distress  HEENT:  PERRLA, EOMI, no scleral icterus, no conjunctival pallor  NECK:  Supple, No elevated JVP, no thyromegaly, no carotid bruits  HEART:  Regular rate and rhythm, normal S1/S2, no significant audible murmur  LUNGS:  Clear to auscultation bilaterally  ABDOMEN:  Soft, non-tender, positive bowel sounds, no rebound or guarding, no organomegaly   EXTREMITIES:  No edema  VASCULAR:  Normal pedal pulses   SKIN: No lesions or rashes on exposed skin  NEURO:  CN II-XII intact, no focal deficits

## 2025-04-11 ENCOUNTER — OFFICE VISIT (OUTPATIENT)
Dept: CARDIOLOGY CLINIC | Facility: CLINIC | Age: 80
End: 2025-04-11
Payer: MEDICARE

## 2025-04-11 VITALS
BODY MASS INDEX: 30.5 KG/M2 | WEIGHT: 225.2 LBS | DIASTOLIC BLOOD PRESSURE: 60 MMHG | HEIGHT: 72 IN | SYSTOLIC BLOOD PRESSURE: 116 MMHG | HEART RATE: 73 BPM

## 2025-04-11 DIAGNOSIS — R00.2 PALPITATIONS: ICD-10-CM

## 2025-04-11 DIAGNOSIS — R55 VASOVAGAL SYNCOPE: ICD-10-CM

## 2025-04-11 DIAGNOSIS — I10 BENIGN ESSENTIAL HYPERTENSION: ICD-10-CM

## 2025-04-11 DIAGNOSIS — I77.810 DILATED AORTIC ROOT (HCC): Primary | ICD-10-CM

## 2025-04-11 PROCEDURE — 93000 ELECTROCARDIOGRAM COMPLETE: CPT | Performed by: STUDENT IN AN ORGANIZED HEALTH CARE EDUCATION/TRAINING PROGRAM

## 2025-04-11 PROCEDURE — G2211 COMPLEX E/M VISIT ADD ON: HCPCS | Performed by: STUDENT IN AN ORGANIZED HEALTH CARE EDUCATION/TRAINING PROGRAM

## 2025-04-11 PROCEDURE — 99214 OFFICE O/P EST MOD 30 MIN: CPT | Performed by: STUDENT IN AN ORGANIZED HEALTH CARE EDUCATION/TRAINING PROGRAM

## 2025-07-20 DIAGNOSIS — R39.11 BENIGN PROSTATIC HYPERPLASIA WITH URINARY HESITANCY: ICD-10-CM

## 2025-07-20 DIAGNOSIS — N40.1 BENIGN PROSTATIC HYPERPLASIA WITH URINARY HESITANCY: ICD-10-CM

## 2025-07-21 ENCOUNTER — OFFICE VISIT (OUTPATIENT)
Dept: URGENT CARE | Facility: CLINIC | Age: 80
End: 2025-07-21
Payer: MEDICARE

## 2025-07-21 VITALS
SYSTOLIC BLOOD PRESSURE: 120 MMHG | HEART RATE: 82 BPM | TEMPERATURE: 96.4 F | DIASTOLIC BLOOD PRESSURE: 62 MMHG | OXYGEN SATURATION: 96 % | RESPIRATION RATE: 18 BRPM

## 2025-07-21 DIAGNOSIS — H00.022 HORDEOLUM INTERNUM OF RIGHT LOWER EYELID: Primary | ICD-10-CM

## 2025-07-21 PROCEDURE — G0463 HOSPITAL OUTPT CLINIC VISIT: HCPCS | Performed by: NURSE PRACTITIONER

## 2025-07-21 PROCEDURE — 99213 OFFICE O/P EST LOW 20 MIN: CPT | Performed by: NURSE PRACTITIONER

## 2025-07-21 RX ORDER — TAMSULOSIN HYDROCHLORIDE 0.4 MG/1
0.4 CAPSULE ORAL
Qty: 90 CAPSULE | Refills: 1 | Status: SHIPPED | OUTPATIENT
Start: 2025-07-21

## 2025-07-21 RX ORDER — ERYTHROMYCIN 5 MG/G
0.5 OINTMENT OPHTHALMIC EVERY 8 HOURS SCHEDULED
Qty: 3.5 G | Refills: 0 | Status: SHIPPED | OUTPATIENT
Start: 2025-07-21

## 2025-07-21 NOTE — PATIENT INSTRUCTIONS
"Patient Education     Stye   The Basics   Written by the doctors and editors at [facility]   What is a stye? -- A stye is a red and painful lump on the eyelid. It happens when a small gland on the edge of the eyelid gets infected or inflamed. Styes can form on the upper or lower eyelids. Most styes get better on their own after a few days to a week. The medical term for stye is \"hordeolum.\"  People sometimes get a stye confused with a different eye problem called a \"chalazion.\" A chalazion also causes a lump on the eyelid. But a stye is caused by an infection and is painful. A chalazion is not tender or painful, but it often lasts longer than a stye does.  What are the symptoms of a stye? -- People who have a stye have a painful lump on the edge of their eyelid (picture 1). The lump might look red, swollen, or similar to a pimple.  A stye usually develops over a few days. Styes can cause other symptoms, too, such as tearing and eyelid pain and swelling.  Is there a test for a stye? -- No. But your doctor or nurse should be able to tell if you have a stye by talking with you and doing an exam.  Is there anything I can do on my own? -- Yes:   Put a warm, wet compress on the stye. Wet a clean washcloth with warm water, and put it over your stye. When the washcloth cools, reheat it with warm water and put it back over the stye. Repeat these steps for about 15 minutes, and try to do this 4 times a day.   It might help to gently massage your eyelid.   Do not squeeze or try to pop your stye. This can make it worse.   Do not wear eye makeup or contact lenses until your stye is gone.  What treatments might my doctor use? -- If your stye doesn't get better or if it leads to other problems, your doctor might:   Prescribe a cream or ointment that goes in the eye and on the eyelid   Prescribe antibiotic medicines   Do a procedure to drain the stye  Can styes be prevented? -- Yes. To lower your chances of getting a stye, you can:   " Wash your hands often - It's especially important to wash your hands before you touch your eyes. Also, if you wear contact lenses, keep them clean and wash your hands before you put them in.   Be careful with your eye makeup - Wearing eye makeup can sometimes cause a stye. Remove your eye makeup each night, and throw away old makeup. Do not share eye makeup with other people.  When should I call the doctor? -- Call your doctor or nurse for advice if:   Your stye doesn't go away after using warm compresses for 1 to 2 weeks.   Your stye gets very big, bleeds, or affects your vision.   Your whole eye is red, or your whole eyelid is red or swollen.   The redness or swelling spreads to your cheek or other parts of your face.  All topics are updated as new evidence becomes available and our peer review process is complete.  This topic retrieved from [employer] on: Feb 26, 2024.  Topic 10037 Version 17.0  Release: 32.2.4 - C32.56  © 2024 UpToDate, Inc. and/or its affiliates. All rights reserved.  picture 1: Stye     This person has a stye on the lower eyelid.  Graphic 31920 Version 2.0  Consumer Information Use and Disclaimer   Disclaimer: This generalized information is a limited summary of diagnosis, treatment, and/or medication information. It is not meant to be comprehensive and should be used as a tool to help the user understand and/or assess potential diagnostic and treatment options. It does NOT include all information about conditions, treatments, medications, side effects, or risks that may apply to a specific patient. It is not intended to be medical advice or a substitute for the medical advice, diagnosis, or treatment of a health care provider based on the health care provider's examination and assessment of a patient's specific and unique circumstances. Patients must speak with a health care provider for complete information about their health, medical questions, and treatment options, including any risks or  benefits regarding use of medications. This information does not endorse any treatments or medications as safe, effective, or approved for treating a specific patient. UpToDate, Inc. and its affiliates disclaim any warranty or liability relating to this information or the use thereof.The use of this information is governed by the Terms of Use, available at https://www.[employer].com/en/know/clinical-effectiveness-terms. 2024© UpToDate, Inc. and its affiliates and/or licensors. All rights reserved.  Copyright   © 2024 UpToDate, Inc. and/or its affiliates. All rights reserved.

## 2025-07-21 NOTE — PROGRESS NOTES
[facility]  Name: Leonel Epperson      : 1945      MRN: [id number]  Encounter Provider: LAURA Ferraro  Encounter Date: 2025   Encounter department: [facility]  :  Assessment & Plan  Hordeolum internum of right lower eyelid    Orders:    erythromycin (ILOTYCIN) ophthalmic ointment; Administer 0.5 inches to both eyes every 8 (eight) hours    I noted the right eye.  Will start erythromycin eye ointment.  Instructions provided.  Follow-up PCP.  Patient agreement with plan.    Patient Instructions  Follow up with PCP in 3-5 days.  Proceed to  ER if symptoms worsen.    If tests are performed, our office will contact you with results only if changes need to made to the care plan discussed with you at the visit. [facility]    Chief Complaint:   Chief Complaint   Patient presents with    Eye Pain     Patient states right eye has been hurting for the weekend. Patient states left eye is starting to tear up. Pressure in right eye.      History of Present Illness   Eye Pain (Patient states right eye has been hurting for the weekend. Patient states left eye is starting to tear up. Pressure in right eye. )  Patient states he did note some crusting in the eye in the morning when he woke up.  Does have tenderness more on the lower eyelid of the right.  Not sure of his left eye is starting to have symptoms.  No recent colds.    Eye Pain           Review of Systems   Eyes:  Positive for pain.   All other systems reviewed and are negative.    Past Medical History   Past Medical History[1]  Past Surgical History[2]  Family History[3]  he reports that he has never smoked. He has never used smokeless tobacco. He reports current alcohol use. He reports that he does not use drugs.  Current Outpatient Medications   Medication Instructions    albuterol (PROVENTIL HFA,VENTOLIN HFA) 90 mcg/act inhaler 2 puffs, Inhalation, Every 6 hours PRN    amLODIPine  (NORVASC) 5 mg, Oral, Daily    aspirin 81 mg, Daily    cetirizine (ZYRTEC) 5 mg, Daily    cholecalciferol (VITAMIN D3) 1,000 units tablet 1 tablet, Daily    clobetasol (TEMOVATE) 0.05 % ointment     Diclofenac Sodium (VOLTAREN) 2 g, 4 times daily    erythromycin (ILOTYCIN) ophthalmic ointment 0.5 inches, Both Eyes, Every 8 hours scheduled    hydrocortisone 2.5 % cream Topical, 2 times daily, prn    Multiple Vitamins-Minerals (CENTRUM SILVER 50+MEN PO) 1 tablet, Daily    tamsulosin (FLOMAX) 0.4 mg, Oral, Daily with dinner    tobramycin (TOBREX) 0.3 % SOLN 1 drop, Both Eyes, Every 4 hours while awake   Allergies[4]     Objective   /62   Pulse 82   Temp (!) 96.4 °F (35.8 °C) (Tympanic)   Resp 18   SpO2 96%      Physical Exam  Vitals and nursing note reviewed.   Constitutional:       General: He is not in acute distress.     Appearance: He is well-developed.   HENT:      Head: Normocephalic and atraumatic.      Nose: Nose normal.     Eyes:      General: Lids are normal. Lids are everted, no foreign bodies appreciated. Vision grossly intact. Gaze aligned appropriately.         Right eye: Hordeolum present.      Conjunctiva/sclera: Conjunctivae normal.         Cardiovascular:      Rate and Rhythm: Normal rate and regular rhythm.      Heart sounds: No murmur heard.  Pulmonary:      Effort: Pulmonary effort is normal. No respiratory distress.      Breath sounds: Normal breath sounds.   Abdominal:      Palpations: Abdomen is soft.      Tenderness: There is no abdominal tenderness.     Musculoskeletal:         General: No swelling.      Cervical back: Normal range of motion and neck supple.     Skin:     General: Skin is warm and dry.      Capillary Refill: Capillary refill takes less than 2 seconds.     Neurological:      Mental Status: He is alert.     Psychiatric:         Mood and Affect: Mood normal.         Portions of the record may have been created with voice recognition software.  Occasional wrong word or  "\"sound a like\" substitutions may have occurred due to the inherent limitations of voice recognition software.  Read the chart carefully and recognize, using context, where substitutions have occurred.       [1]   Past Medical History:  Diagnosis Date    Abnormal blood chemistry     last assessed: 5/1/2013    Abnormal blood sugar     last assessed: 12/18/2014    Allergic     Allergic rhinitis     last assessed: 11/8/2013    Arthritis     Basal cell carcinoma      Face    BPH (benign prostatic hypertrophy)     Cancer (HCC)     Diabetes mellitus (HCC)     last assessed: 4/30/2013    Headache(784.0)     Hypertension     Low back pain     Nodule of finger of right hand     last assessed: 10/19/2015    Obstruction of right eustachian tube     last assessed: 10/11/2016   [2]   Past Surgical History:  Procedure Laterality Date    CATARACT EXTRACTION, BILATERAL      COLONOSCOPY  1999 1 polyp    COLONOSCOPY  2007    Hyperplastic polyp    COLONOSCOPY  2009     And 2013 Normal    COLONOSCOPY  12/29/2017    15 mm polyp proximal sigmoid colon-tubular adenoma.  Diverticulosis by Dr. Rich    INGUINAL HERNIA REPAIR      SPINE SURGERY  9/14/2020   [3]   Family History  Problem Relation Name Age of Onset    Heart failure Mother Heide Epperson     Arthritis Mother Heide Epperson     Vision loss Mother Heide Epperson     Dementia Father Jurgen Epperson     Arthritis Father Jurgen Epperson     Colon cancer Maternal Grandmother Heide Hurtado         or stomach (unsure)    Cancer Maternal Grandmother Heide Hurtado     Colon cancer Son Everardo Epperson 42        2x and liver    Heart disease Sister Tennille Lopez         Heart valve replaced    Heart disease Brother Robert Zeenat         Heart valve problem    Heart attack Paternal Grandfather Leonel Zeenat     Heart attack Paternal Grandmother Paulette Zeenat    [4]   Allergies  Allergen Reactions    Doxycycline     Amoxicillin Diarrhea    Prednisone Palpitations     Medrol Dose pack ok     Sulfamethoxazole-Trimethoprim "      Septra